# Patient Record
Sex: MALE | Race: WHITE | Employment: OTHER | URBAN - METROPOLITAN AREA
[De-identification: names, ages, dates, MRNs, and addresses within clinical notes are randomized per-mention and may not be internally consistent; named-entity substitution may affect disease eponyms.]

---

## 2017-12-14 ENCOUNTER — HOSPITAL ENCOUNTER (OUTPATIENT)
Dept: PHYSICAL THERAPY | Age: 82
Discharge: HOME | End: 2017-12-14
Payer: MEDICARE

## 2017-12-14 PROCEDURE — G9186 MOTOR SPEECH GOAL STATUS: HCPCS

## 2017-12-14 PROCEDURE — G8999 MOTOR SPEECH CURRENT STATUS: HCPCS

## 2017-12-14 PROCEDURE — 92522 EVALUATE SPEECH PRODUCTION: CPT

## 2017-12-14 NOTE — PROGRESS NOTES
ST DAILY TREATMENT NOTE    Patient Name: Elva Mauro  Date:2017  : 1935  [x]  Patient  Verified  Payor: /   In time:2:46  Out time:3:18  Total Treatment Time (min): 34  Visit #: 1 of 12    SUBJECTIVE  Pain Level (0-10 scale): 0  Any medication changes, allergies to medications, adverse drug reactions, diagnosis change, or new procedure performed?: [x] No    [] Yes (see summary sheet for update)  Subjective functional status/changes:   [] No changes reported  \"I want to increase my voice (volume) and improve my communication. \"  Date of Onset:   Social History: Retired   Prior Functional level: Independent with ADLs    OBJECTIVE    OUTPATIENT SPEECH-LANGUAGE EVALUATION    Speech:  Oral Verbal Apraxia: [x] None    [] Mild    [] Moderate    [] Severe   Dysarthria:  [] None    [x] Mild    [x] Moderate    [] Severe   Intelligibility:  [] WNL    [] Words   [x] Phrases   [x] Sentences   [x] Conversation      % Intelligible: 75-80%  Voice:   [] WNL    [] Hoarse   [] Breathy   Comments: Intermittent breathiness  Fluency:  [] WNL    [x] Dysfluent: Increased rate of speech during conversation and AMRs      Patient/Caregiver instruction/education: [] Review HEP    [] Progressed/Changed    HEP/Handouts given: Provided information regarding his treatment plan    Pain Level (0-10 scale) post treatment: 0    ASSESSMENT  [x]  See Plan of Care    Short Term Goals: To be accomplished in 4 weeks  1) Pt will produce sentence-length material with an average of 80-85 dB with min A in order to improve his speech intensity during daily tasks. 2) Pt will produce paragraph-length material with an average of 78-80 dB with min A in order to improve speech intensity during lengthier material.  3) Pt will engage in conversation maintaining 78-85 dB with min A in order to improve overall speech intensity during conversational exchanges with family and friends.   4) Pt will sustain phonation for greater than or equal to 20 seconds in 15 trials on 1 breath with min A to improve coordination of speech and respiration. Long Term Goals:  To be accomplished in 6 weeks   1) Pt will demonstrate improve respiratory strength and vocal intensity in conversation with family     PLAN  []  Upgrade activities as tolerated     [x]  Continue plan of care  []  Discharge due to:__  [] Other:__    TAMI Appiah 12/14/2017  3:59 PM

## 2017-12-14 NOTE — PROGRESS NOTES
[facility] at [facility] [place] [id number] [facility]  [phone] fax    Plan of Care/ Statement of Necessity for Speech Therapy Services    Patient name: Karen Mitchell Start of Care: 2017   Referral source: Denia Guevara DO : 1935    Medical Diagnosis: Parkinson's Onset [id number]   Treatment Diagnosis: Dysarthria   Prior Hospitalization: see medical history Provider#: [id number]   Medications: Verified on Patient summary List    Comorbidities: Depression, Past Prostate Cancer, Past Melanoma   Prior Level of Function: Independent with ADLs       The Plan of Care and following information is based on the information from the initial evaluation. Assessment/ key information: Pt is an 79-year-old male with a history of Parkinson's. His chief complaint is decreased vocal volume during conversational exchanges, as well as concerns that others have difficulty understanding his speech. The Pt reports that he was diagnosed with Parkinson's five years ago and he noted that he began noticing difficulty with speech intensity  In the past few years. Pt noted that he rarely talks on the phone because conversation partners have extreme difficulty understanding him. Pt also reported mild difficulty with swallowing when eating tough meats such as steaks and roast beef. He reported that he occasionally coughs, but experiences no other signs of aspiration. He tolerated 6 crackers today with no signs or symptoms of aspiration observed. He was provided information to protect his airway when eating meats. Pt maintained an average of 60-65 dB during sentence production, as measured by a sound level meter. He engaged in the administration of the Perceptual Dysarthria Evaluation. 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  Dysarthria is characterized my decreased speech intensity, decreased respiration and phonation coordination, fast and irregular AMRs, and fast and irregular conversational speech. He noted that people often ask him to repeat himself in the independent living facility where he resides. It is recommended that the Pt receive skilled speech therapy services as it is medically necessary to improve his vocal intensity and confidence during daily tasks. Problem List:      []aphasic  [x]dysarthric  []dysphagic       []alexic  []agraphic  []dysphonia       []dysfluency   []Cognitive-Linguistic Disorder       []other   Treatment Plan may include any combination of the following: Dysarthria Treatment      Patient / Family readiness to learn indicated by: asking questions, trying to perform skills and interest    Persons(s) to be included in education:   patient (P) and family support person (FSP);list Daughter    Barriers to Learning/Limitations: yes;  emotional and physical    Patient Goal (s): To increase volume and improve communication    Patient Self Reported Health Status: fair    Rehabilitation Potential: good    Short Term Goals: To be accomplished in 4 weeks  1) Pt will produce sentence-length material with an average of 80-85 dB with min A in order to improve his speech intensity during daily tasks. 2) Pt will produce paragraph-length material with an average of 78-80 dB with min A in order to improve speech intensity during lengthier material.  3) Pt will engage in conversation maintaining 78-85 dB with min A in order to improve overall speech intensity during conversational exchanges with family and friends. 4) Pt will sustain phonation for greater than or equal to 20 seconds in 15 trials on 1 breath with min A to improve coordination of speech and respiration. Long Term Goals: To be accomplished in 6 weeks   1) Pt will demonstrate improve respiratory strength and vocal intensity in conversation with family and friens with min A.     Frequency / Duration: Patient to be seen 3 times per week for 4 weeks:    Patient/ Caregiver education and instruction: Diagnosis, prognosis, treatment plan    Certification period: 12/14/17-1/13/18    G Codes:   MOTOR Current Status CL=60-79%   Motor Goal Status CK=40-59%    The severity rating is based on the following outcomes:    National Outcomes Measures (NOMS), Perceptual Dysarthria Evaluation, and Professional Judgment       Aileen Juárez M.S., CCC-SLP     12/14/17 3:30 PM  ________________________________________________________________________    I certify that the above Therapy Services are being furnished while the patient is under my care. I agree with the treatment plan and certify that this therapy is necessary.     Physician's Signature:____________________  Date:___________Time:_________    Please sign and return to [facility] at THE [place] [address] [facility]  [phone] fax     Thank you

## 2018-01-02 ENCOUNTER — HOSPITAL ENCOUNTER (OUTPATIENT)
Dept: PHYSICAL THERAPY | Age: 83
Discharge: HOME | End: 2018-01-02
Payer: MEDICARE

## 2018-01-02 ENCOUNTER — APPOINTMENT (OUTPATIENT)
Dept: PHYSICAL THERAPY | Age: 83
End: 2018-01-02
Payer: MEDICARE

## 2018-01-02 PROCEDURE — 92507 TX SP LANG VOICE COMM INDIV: CPT

## 2018-01-02 NOTE — PROGRESS NOTES
ST DAILY TREATMENT NOTE    Patient Name: Sita Jarrell  Date:2018  : 1935  [x]  Patient  Verified  Payor: Payor: Mckenzie Ego / Plan: [employer]MO / Product Type: HMO /   In time:2:45  Out time:3:15  Total Treatment Time (min): 30  Visit #: 2 of 12    Treatment Diagnosis: Parkinson's disease [G20]    SUBJECTIVE  Pain Level (0-10 scale): 0  Any medication changes, allergies to medications, adverse drug reactions, diagnosis change, or new procedure performed?: [x] No    [] Yes (see summary sheet for update)    Subjective functional status/changes:   [x] No changes reported  Pt reported no functional changes at this time. 1st session since initial eval    OBJECTIVE  Treatment provided includes:  Increase/Improve:  [x]  Voice Quality []  Cognitive Linguistic Skills []  Laryngeal/Pharyngeal Exercises   [x]  Vocal Loudness []  Reading Comprehension []  Swallowing Skills    []  Vocal Cord Function []  Auditory Comprehension []  Oral Motor Skills   []  Resonance []  Writing Skills []  Compensatory strategies    []  Speech Intelligibility []  Expressive Language []  Attention   [x]  Breath Support/Coord.  []  Receptive language []  Memory   []  Articulation []  Safety Awareness []    []  Fluency []  Word Retrieval []        Treatment Provided:  Pt engaged in sustained phonation of \"ah\" with a range of 70-79 dB with min cues to decrease straining  Pt read sentences x5 with an average of 67 dB  Pt read short paragraphs x6 with an average of 66 dB    Patient/Caregiver  Education: [x] Review HEP      HEP/Handouts given: Introduced daily HEP    Pain Level (0-10 scale) post treatment: 2 (Vocal hoarseness; prompted Pt to increase hydration)    ASSESSMENT   []   Improving appropriately and progressing toward goals  [x]   Improving slowly and progressing toward goals  []   Approximating goals/maximum potential  [x]   Continues to benefit from skilled therapy to address remaining functional deficits  []   Not progressing toward goals and plan of care will be adjusted    Patient will continue to benefit from skilled therapy to address remaining functional deficits: Hypokinetic Dysarthria    Progress towards goals / Updated goals:  Pt introduced to exercises program today to increase vocal loudness and respiration/phonation coordination. Pt's current vocal intensity below expected level. Pt demonstrated understanding of home exercise program and should continue to receive skilled speech therapy to address Dysarthria related to Parkinson's. PLAN  [x]  Continue plan of care  []  Modify Goals/Treatment Plan      []  Discharge due to:  [] Other:     Short Term Goals: To be accomplished in 4 weeks  1) Pt will produce sentence-length material with an average of 80-85 dB with min A in order to improve his speech intensity during daily tasks. 1/2/18: Pt read sentences x5 with an average of 67 dB  2) Pt will produce paragraph-length material with an average of 78-80 dB with min A in order to improve speech intensity during lengthier material. 1/2/18: Pt read short paragraphs x6 with an average of 66 dB  3) Pt will engage in conversation maintaining 78-85 dB with min A in order to improve overall speech intensity during conversational exchanges with family and friends. 4) Pt will sustain phonation for greater than or equal to 20 seconds in 15 trials on 1 breath with min A to improve coordination of speech and respiration.     TAMI Hazel 1/2/2018  2:56 PM    Future Appointments  Date Time Provider Rebecca Lyle   1/3/2018 2:00 PM TAMI Hazel [facility]   1/4/2018 2:45 PM TAMI Hazel [facility]

## 2018-01-03 ENCOUNTER — HOSPITAL ENCOUNTER (OUTPATIENT)
Dept: PHYSICAL THERAPY | Age: 83
Discharge: HOME | End: 2018-01-03
Payer: MEDICARE

## 2018-01-03 ENCOUNTER — APPOINTMENT (OUTPATIENT)
Dept: PHYSICAL THERAPY | Age: 83
End: 2018-01-03
Payer: MEDICARE

## 2018-01-03 PROCEDURE — 92507 TX SP LANG VOICE COMM INDIV: CPT

## 2018-01-03 NOTE — PROGRESS NOTES
ST DAILY TREATMENT NOTE    Patient Name: Roseann Oneil  Date:1/3/2018  : 1935  [x]  Patient  Verified  Payor: Payor: Dolores Payment / Plan: [employer] / Product Type: HMO /   In time:2:00  Out time:2:39  Total Treatment Time (min): 39  Visit #: 3 of 12    Treatment Diagnosis: Parkinson's disease [G20]    SUBJECTIVE  Pain Level (0-10 scale): 0  Any medication changes, allergies to medications, adverse drug reactions, diagnosis change, or new procedure performed?: [x] No    [] Yes (see summary sheet for update)    Subjective functional status/changes:   [] No changes reported  \"I was slow getting started this morning. \"    OBJECTIVE  Treatment provided includes:  Increase/Improve:  []  Voice Quality []  Cognitive Linguistic Skills []  Laryngeal/Pharyngeal Exercises   [x]  Vocal Loudness []  Reading Comprehension []  Swallowing Skills    []  Vocal Cord Function []  Auditory Comprehension []  Oral Motor Skills   []  Resonance []  Writing Skills []  Compensatory strategies    []  Speech Intelligibility []  Expressive Language []  Attention   [x]  Breath Support/Coord.  []  Receptive language []  Memory   []  Articulation []  Safety Awareness []    []  Fluency []  Word Retrieval []        Treatment Provided:  Pt engaged in sustained phonation of \"ah\" with a range of 66-78 dB with min cues to decrease straining  Pt read sentences x5 with an average of 67 dB  Pt read short paragraphs x6 with an average of 66 dB    Patient/Caregiver  Education: [x] Review HEP      HEP/Handouts given: Continue HEP     Pain Level (0-10 scale) post treatment: 0    ASSESSMENT   []   Improving appropriately and progressing toward goals  [x]   Improving slowly and progressing toward goals  []   Approximating goals/maximum potential  [x]   Continues to benefit from skilled therapy to address remaining functional deficits  []   Not progressing toward goals and plan of care will be adjusted    Patient will continue to benefit from skilled therapy to address remaining functional deficits: Hypokinetic Dysarthria    Progress towards goals / Updated goals:  Pt remains compliant with HEP and demonstrates knowledge of rationale for exercises. He requires cues to promote good vocal hygiene in the home, as he tends to become hoarse while performing exercises. It is recommended that he continue to receive skilled speech therapy services to address Dysarthria related to Parkinson's. PLAN  [x]  Continue plan of care  []  Modify Goals/Treatment Plan      []  Discharge due to:  [] Other:    Short Term Goals: To be accomplished in 4 weeks  1) Pt will produce sentence-length material with an average of 80-85 dB with min A in order to improve his speech intensity during daily tasks. 1/3/18: Pt read sentences x5 with an average of 68 dB  2) Pt will produce paragraph-length material with an average of 78-80 dB with min A in order to improve speech intensity during lengthier material. 1/3/18: Pt read short paragraphs x6 with an average of 67 dB  3) Pt will engage in conversation maintaining 78-85 dB with min A in order to improve overall speech intensity during conversational exchanges with family and friends. 4) Pt will sustain phonation for greater than or equal to 20 seconds in 15 trials on 1 breath with min A to improve coordination of speech and respiration.     TAMI Henson 1/3/2018  2:18 PM    Future Appointments  Date Time Provider Rebecca Lyle   1/4/2018 2:45 PM TAMI Henson [facility]

## 2018-01-04 ENCOUNTER — APPOINTMENT (OUTPATIENT)
Dept: PHYSICAL THERAPY | Age: 83
End: 2018-01-04
Payer: MEDICARE

## 2018-01-11 ENCOUNTER — HOSPITAL ENCOUNTER (OUTPATIENT)
Dept: PHYSICAL THERAPY | Age: 83
Discharge: HOME | End: 2018-01-11
Payer: MEDICARE

## 2018-01-11 PROCEDURE — G8999 MOTOR SPEECH CURRENT STATUS: HCPCS

## 2018-01-11 PROCEDURE — G9186 MOTOR SPEECH GOAL STATUS: HCPCS

## 2018-01-11 PROCEDURE — 92507 TX SP LANG VOICE COMM INDIV: CPT

## 2018-01-11 NOTE — PROGRESS NOTES
[facility]  [address] [place], [address] [facility]  Ph [phone]  Fx [phone]      Medicare Progress Report    Patient name: Carlos Manuel Tobar Start of Care: 17   Referral source: Connie Melendez DO : 1935   Medical/Treatment Diagnosis: Parkinson's disease [G20] Onset Date:     Prior Hospitalization: see medical history Provider#: [id number]   Medications: Verified on Patient Summary List    Comorbidities: Depression, Past Prostate Cancer, Past Melanoma  Prior Level of Function:Independent with ADLs  Visits from Start of Care: 4    Missed Visits: 0    Reporting Period: 17-18    Subjective Reports: Pt reports that he feels his voice is getting stronger. Goal: Pt will produce sentence-length material with an average of 80-85 dB with min A in order to improve his speech intensity during daily tasks. Status at last note/certification: Average of 67 dB x5  Current Status: not met; Average of 67 dB X5    Goal: Pt will produce paragraph-length material with an average of 78-80 dB with min A in order to improve speech intensity during lengthier material.  Status at last note/certification: short paragraphs x6 with an average of 66 dB  Current Status: not met; short paragraphs x3 with an average of 66 dB     Goal: Pt will engage in conversation maintaining 78-85 dB with min A in order to improve overall speech intensity during conversational exchanges with family and friends. Status at last note/certification: NOT YET ADDRESSED  Current Status: not met    Goal: Pt will sustain phonation for greater than or equal to 20 seconds in 15 trials on 1 breath with min A to improve coordination of speech and respiration.   Status at last note/certification: GOAL OMITTED  Current Status: not met    Key functional changes: Slightly louder with increased vocal intensity      Problems/ barriers to goal attainment: Physical     Assessment / Recommendations:Pt should continue receiving skilled speech and language therapy to address Dysarthria related to Parkinson's. Problem List:    []aphasic  [x]dysarthric  []dysphagic       []alexic  []agraphic  []dysphonia       []dysfluency   []Cognitive-Linguistic Disorder       []other        Treatment Plan: Dysarthria Treatment    Patient Goal (s) has been updated and includes: Continued work to improve vocal intensity     Updated Goals to be accomplished in 4 weeks:  1) Pt will produce sentence-length material with an average of 72-75 dB with min A in order to improve his speech intensity during daily tasks. 2) Pt will produce paragraph-length material with an average of 70-75 dB with min A in order to improve speech intensity during lengthier material.  3) Pt will engage in conversation maintaining 70-73 dB with min A in order to improve overall speech intensity during conversational exchanges with family and friends.     Frequency / Duration: Patient to be seen 2 times per week for 4 weeks:    G Codes:  [id number] Current  CL= 60-79%   Goal  CK= 40-59%      The severity rating is based on the following outcomes:    National Outcomes Measures (NOMS) and professional judgement      TAMI Duran 1/11/2018 5:19 PM

## 2018-01-11 NOTE — PROGRESS NOTES
ST DAILY TREATMENT NOTE    Patient Name: Ke Lehman  Date:2018  : 1935  [x]  Patient  Verified  Payor: Payor: Anika Daigle / Plan: [employer] HMO / Product Type: HMO /   In time:3:15  Out time:4:00  Total Treatment Time (min): 39  Visit #: 4 of 12    Treatment Diagnosis: Parkinson's disease [G20]    SUBJECTIVE  Pain Level (0-10 scale): 0  Any medication changes, allergies to medications, adverse drug reactions, diagnosis change, or new procedure performed?: [x] No    [] Yes (see summary sheet for update)    Subjective functional status/changes:   [] No changes reported  Pt's daughter reported that Pt called EMS this morning due to extreme difficulty swallowing. Pt was attempting to swallow saliva. Pt was not admitted to the hospital.    OBJECTIVE  Treatment provided includes:  Increase/Improve:  []  Voice Quality []  Cognitive Linguistic Skills []  Laryngeal/Pharyngeal Exercises   [x]  Vocal Loudness []  Reading Comprehension [x]  Swallowing Skills    []  Vocal Cord Function []  Auditory Comprehension []  Oral Motor Skills   []  Resonance []  Writing Skills [x]  Compensatory strategies    []  Speech Intelligibility []  Expressive Language []  Attention   [x]  Breath Support/Coord. []  Receptive language []  Memory   []  Articulation []  Safety Awareness []    []  Fluency []  Word Retrieval []        Treatment Provided:  Pt engaged in sustained phonation of \"ah\" with a range of 74-80 dB with min cues to decrease straining  Pt read sentences x5 with an average of 67 dB  Pt read short paragraphs x3 with an average of 66 dB   Informal swallow exam revealed normal hyolaryngeal excursion/elevation;  Difficulty initiating swallow     Patient/Caregiver  Education: [x] Review HEP      HEP/Handouts given: Continue HEP    Pain Level (0-10 scale) post treatment: 0    ASSESSMENT   []   Improving appropriately and progressing toward goals  [x]   Improving slowly and progressing toward goals  []   Approximating goals/maximum potential  []   Continues to benefit from skilled therapy to address remaining functional deficits  [x]   Not progressing toward goals and plan of care will be adjusted    Patient will continue to benefit from skilled therapy to address remaining functional deficits: Dysarthria and mild Dysphagia    Progress towards goals / Updated goals:  Pt's vocal intensity is improving slowly. SLP introduced compensatory swallow strategies to improve timing of swallow and reduce globus sensation. PLAN  [x]  Continue plan of care  []  Modify Goals/Treatment Plan      []  Discharge due to:  [] Other:     Short Term Goals: To be accomplished in 4 weeks  1) Pt will produce sentence-length material with an average of 80-85 dB with min A in order to improve his speech intensity during daily tasks. 1/11/18: Pt read sentences x5 with an average of 67 dB  2) Pt will produce paragraph-length material with an average of 78-80 dB with min A in order to improve speech intensity during lengthier material. 1/11/18: Pt read short paragraphs x6 with an average of 66 dB  3) Pt will engage in conversation maintaining 78-85 dB with min A in order to improve overall speech intensity during conversational exchanges with family and friends.   4) Pt will sustain phonation for greater than or equal to 20 seconds in 15 trials on 1 breath with min A to improve coordination of speech and respiration.       Vickii Goltz, SLP 1/11/2018  3:40 PM    Future Appointments  Date Time Provider Rebecca Lyle   1/18/2018 3:15 PM Vickii Goltz, SLP [facility]   1/23/2018 3:15 PM Vickii Goltz, SLP [facility]   1/25/2018 3:15 PM Vickii Goltz, SLP [facility]   1/30/2018 3:15 PM Vickii Goltz, SLP [facility]   2/1/2018 3:15 PM Vickii Goltz, SLP [facility]   2/6/2018 2:45 PM Vickii Goltz, SLP [facility]   2/8/2018 2:45 PM Vickii Goltz, SLP [facility]

## 2018-01-18 ENCOUNTER — APPOINTMENT (OUTPATIENT)
Dept: PHYSICAL THERAPY | Age: 83
End: 2018-01-18
Payer: MEDICARE

## 2018-01-23 ENCOUNTER — HOSPITAL ENCOUNTER (OUTPATIENT)
Dept: PHYSICAL THERAPY | Age: 83
Discharge: HOME | End: 2018-01-23
Payer: MEDICARE

## 2018-01-23 PROCEDURE — 92507 TX SP LANG VOICE COMM INDIV: CPT

## 2018-01-23 NOTE — PROGRESS NOTES
ST DAILY TREATMENT NOTE    Patient Name: Paco Zaidi  Date:2018  : 1935  [x]  Patient  Verified  Payor: Payor: Aissatou Fischer / Plan: Stephon Brink HMO / Product Type: HMO /   In time:3:15  Out time:3:55  Total Treatment Time (min): 40  Visit #: 1 of 8    Treatment Diagnosis: Parkinson's disease [G20]    SUBJECTIVE  Pain Level (0-10 scale): 0  Any medication changes, allergies to medications, adverse drug reactions, diagnosis change, or new procedure performed?: [x] No    [] Yes (see summary sheet for update)    Subjective functional status/changes:   [] No changes reported  Pt reported that he has been \"down in the dumps\" the last few days. OBJECTIVE  Treatment provided includes:  Increase/Improve:  []  Voice Quality []  Cognitive Linguistic Skills []  Laryngeal/Pharyngeal Exercises   [x]  Vocal Loudness []  Reading Comprehension []  Swallowing Skills    []  Vocal Cord Function []  Auditory Comprehension []  Oral Motor Skills   []  Resonance []  Writing Skills []  Compensatory strategies    []  Speech Intelligibility []  Expressive Language []  Attention   [x]  Breath Support/Coord.  []  Receptive language []  Memory   []  Articulation []  Safety Awareness []    []  Fluency []  Word Retrieval []        Treatment Provided:  Pt engaged in sustained phonation of \"ah\" with an average of 77 dB Víctor  Pt read sentences x5 with an average of 68 dB  Pt read short paragraphs x8 with an average of 67 dB     Patient/Caregiver  Education: [x] Review HEP      HEP/Handouts given: Continue HEP, but discontinue practice if severe hoarseness becomes an issue    Pain Level (0-10 scale) post treatment: 0    ASSESSMENT   []   Improving appropriately and progressing toward goals  [x]   Improving slowly and progressing toward goals  []   Approximating goals/maximum potential  [x]   Continues to benefit from skilled therapy to address remaining functional deficits  []   Not progressing toward goals and plan of care will be adjusted    Patient will continue to benefit from skilled therapy to address remaining functional deficits: Dysarthria    Progress towards goals / Updated goals:  Pt continues to make slow progress towards goals. He has demonstrated small improvements in his ability to read short sentences and paragraphs with increased vocal volume. It is recommended that he continue to receive skilled speech therapy to address remaining deficits related to Dysarthria. PLAN  [x]  Continue plan of care  []  Modify Goals/Treatment Plan      []  Discharge due to:  [] Other:    Short-Term Goals to be accomplished in 4 weeks:  1) Pt will produce sentence-length material with an average of 72-75 dB with min A in order to improve his speech intensity during daily tasks.  1/23/18: Pt read sentences x5 with an average of 68 dB  2) Pt will produce paragraph-length material with an average of 70-75 dB with min A in order to improve speech intensity during lengthier material. 1/23/18: Pt read short paragraphs x8 with an average of 67 dB   3) Pt will engage in conversation maintaining 70-73 dB with min A in order to improve overall speech intensity during conversational exchanges with family and friends    TAMI Galicia 1/23/2018  3:23 PM    Future Appointments  Date Time Provider Rebecca Lyle   1/25/2018 3:15 PM TAMI Galicia [facility]   1/30/2018 3:15 PM TAMI Galicia [facility]   2/1/2018 3:15 PM TAMI Galicia [facility]   2/6/2018 2:45 PM TAMI Galicia [facility]   2/8/2018 2:45 PM TAMI Galicia [facility]

## 2018-01-25 ENCOUNTER — HOSPITAL ENCOUNTER (OUTPATIENT)
Dept: PHYSICAL THERAPY | Age: 83
Discharge: HOME | End: 2018-01-25
Payer: MEDICARE

## 2018-01-25 PROCEDURE — 92507 TX SP LANG VOICE COMM INDIV: CPT

## 2018-01-25 NOTE — PROGRESS NOTES
ST DAILY TREATMENT NOTE    Patient Name: Millie Greene  Date:2018  : 1935  [x]  Patient  Verified  Payor: Payor: Erlinda Jean Baptiste / Plan: Stefan Perry HMO / Product Type: HMO /   In time:3:15  Out time:4:00  Total Treatment Time (min): 39  Visit #: 2 of 8    Treatment Diagnosis: Parkinson's disease [G20]    SUBJECTIVE  Pain Level (0-10 scale): 0  Any medication changes, allergies to medications, adverse drug reactions, diagnosis change, or new procedure performed?: [x] No    [] Yes (see summary sheet for update)    Subjective functional status/changes:   [] No changes reported  Pt reported that he has been able to complete HEP more frequently over the past couple of days. OBJECTIVE  Treatment provided includes:  Increase/Improve:  []  Voice Quality []  Cognitive Linguistic Skills []  Laryngeal/Pharyngeal Exercises   [x]  Vocal Loudness []  Reading Comprehension []  Swallowing Skills    []  Vocal Cord Function []  Auditory Comprehension []  Oral Motor Skills   []  Resonance []  Writing Skills []  Compensatory strategies    [x]  Speech Intelligibility []  Expressive Language []  Attention   []  Breath Support/Coord.  []  Receptive language []  Memory   []  Articulation []  Safety Awareness []    []  Fluency []  Word Retrieval []        Treatment Provided:  Pt engaged in sustained phonation of \"ah\" with an average of 76 dB Víctor  Pt read sentences x5 with an average of 71 dB  Pt engaged in conversation with an average of 65 dB     Patient/Caregiver  Education: [x] Review HEP      HEP/Handouts given: Continue HEP    Pain Level (0-10 scale) post treatment: 0    ASSESSMENT   []   Improving appropriately and progressing toward goals  [x]   Improving slowly and progressing toward goals  []   Approximating goals/maximum potential  [x]   Continues to benefit from skilled therapy to address remaining functional deficits  []   Not progressing toward goals and plan of care will be adjusted    Patient will continue to benefit from skilled therapy to address remaining functional deficits: Dysarthria    Progress towards goals / Updated goals:  Pt demonstrated improved vocal intensity during oral reading of sentences this session. Continued work is needed to maintain vocal intensity during conversational speech. It is recommended that he continue to receive skilled speech and language therapy. PLAN  [x]  Continue plan of care  []  Modify Goals/Treatment Plan      []  Discharge due to:  [] Other:    Short-Term Goals to be accomplished in 4 weeks:  1) Pt will produce sentence-length material with an average of 72-75 dB with min A in order to improve his speech intensity during daily tasks. 1/25/18: Pt read sentences x5 with an average of 71 dB  2) Pt will produce paragraph-length material with an average of 70-75 dB with min A in order to improve speech intensity during lengthier material.   3) Pt will engage in conversation maintaining 70-73 dB with min A in order to improve overall speech intensity during conversational exchanges with family and friends.  1/25/18: Pt engaged in conversation with an average of 65 dB     TAMI York 1/25/2018  3:17 PM    Future Appointments  Date Time Provider Rebecca Lyle   1/30/2018 3:15 PM TAMI York [facility]   2/1/2018 3:15 PM TAMI York [facility]   2/2/2018 10:30 AM [facility]   2/6/2018 2:45 PM TAMI York [facility]   2/8/2018 2:45 PM TAMI York [facility]

## 2018-01-30 ENCOUNTER — HOSPITAL ENCOUNTER (OUTPATIENT)
Dept: PHYSICAL THERAPY | Age: 83
Discharge: HOME | End: 2018-01-30
Payer: MEDICARE

## 2018-01-30 PROCEDURE — 92507 TX SP LANG VOICE COMM INDIV: CPT

## 2018-01-30 NOTE — PROGRESS NOTES
ST DAILY TREATMENT NOTE    Patient Name: Tyler Larsen  Date:2018  : 1935  [x]  Patient  Verified  Payor: Payor: Alfredo Huang / Plan: [employer] HMO / Product Type: HMO /   In time:3:15  Out time:3:50  Total Treatment Time (min): 35  Visit #: 3 of 8    Treatment Diagnosis: Parkinson's disease [G20]    SUBJECTIVE  Pain Level (0-10 scale): 0; Pt reported he is experiencing cold symptoms  Any medication changes, allergies to medications, adverse drug reactions, diagnosis change, or new procedure performed?: [x] No    [] Yes (see summary sheet for update)    Subjective functional status/changes:   [] No changes reported  \"One of my buddies said he could hear a slight difference in my voice. \"    OBJECTIVE  Treatment provided includes:  Increase/Improve:  []  Voice Quality []  Cognitive Linguistic Skills []  Laryngeal/Pharyngeal Exercises   [x]  Vocal Loudness []  Reading Comprehension []  Swallowing Skills    []  Vocal Cord Function []  Auditory Comprehension []  Oral Motor Skills   []  Resonance []  Writing Skills []  Compensatory strategies    []  Speech Intelligibility []  Expressive Language []  Attention   [x]  Breath Support/Coord.  []  Receptive language []  Memory   []  Articulation []  Safety Awareness []    []  Fluency []  Word Retrieval []        Treatment Provided:  Pt engaged in sustained phonation of \"ah\" with an average of 75 dB Víctor  Pt read sentences x5 with an average of 70 dB  Pt read paragraph-level material with an average of 69 dB     Patient/Caregiver  Education: [x] Review HEP      HEP/Handouts given: Continue HEP at least 1-2x daily    Pain Level (0-10 scale) post treatment: 0    ASSESSMENT   []   Improving appropriately and progressing toward goals  [x]   Improving slowly and progressing toward goals  []   Approximating goals/maximum potential  [x]   Continues to benefit from skilled therapy to address remaining functional deficits  []   Not progressing toward goals and plan of care will be adjusted    Patient will continue to benefit from skilled therapy to address remaining functional deficits: Dysarthria    Progress towards goals / Updated goals:  Pt continues to make slow progress towards goal. He demonstrated improved loudness and vocal control during sustained phonation exercises this date. Pt engaged in the administration of the Voice Handicap Index (VHI) to assess his current attitudes regarding his voice. He received a Moderate rating in terms of how his voice impacts him functionally, physically, and emotionally. It is recommended that he continue to receive skilled speech therapy to address remaining deficits related to Dysarthria. PLAN  [x]  Continue plan of care  []  Modify Goals/Treatment Plan      []  Discharge due to:  [] Other:     Short-Term Goals to be accomplished in 4 weeks:  1) Pt will produce sentence-length material with an average of 72-75 dB with min A in order to improve his speech intensity during daily tasks. 1/30/18: Pt read sentences x5 with an average of 70 dB  2) Pt will produce paragraph-length material with an average of 70-75 dB with min A in order to improve speech intensity during lengthier material. 1/30/18: Pt read paragraph-level material with an average of 69 dB  3) Pt will engage in conversation maintaining 70-73 dB with min A in order to improve overall speech intensity during conversational exchanges with family and friends.     TAMI العراقي 1/30/2018  3:16 PM    Future Appointments  Date Time Provider Rebecca Lyle   2/1/2018 3:15 PM TAMI العراقي [facility]   2/2/2018 10:30 AM THE [facility] [place]  3 [facility]   2/6/2018 2:45 PM TAMI العراقي [facility]   2/8/2018 2:45 PM TAMI العراقي [facility]

## 2018-02-01 ENCOUNTER — HOSPITAL ENCOUNTER (OUTPATIENT)
Dept: PHYSICAL THERAPY | Age: 83
Discharge: HOME | End: 2018-02-01
Payer: MEDICARE

## 2018-02-01 PROCEDURE — 92507 TX SP LANG VOICE COMM INDIV: CPT

## 2018-02-01 NOTE — PROGRESS NOTES
ST DAILY TREATMENT NOTE    Patient Name: Sheila Sheehan  Date:2018  : 1935  [x]  Patient  Verified  Payor: Payor: Ligia Artis / Plan: [employer] HMO / Product Type: HMO /   In time:3:20  Out time:3:55  Total Treatment Time (min): 30  Visit #: 4 of 8    Treatment Diagnosis: Parkinson's disease [G20]    SUBJECTIVE  Pain Level (0-10 scale): 0  Any medication changes, allergies to medications, adverse drug reactions, diagnosis change, or new procedure performed?: [x] No    [] Yes (see summary sheet for update)    Subjective functional status/changes:   [] No changes reported  \"I'm having an internal lima with self today. \"     OBJECTIVE  Treatment provided includes:  Increase/Improve:  []  Voice Quality []  Cognitive Linguistic Skills []  Laryngeal/Pharyngeal Exercises   [x]  Vocal Loudness []  Reading Comprehension []  Swallowing Skills    []  Vocal Cord Function []  Auditory Comprehension []  Oral Motor Skills   []  Resonance []  Writing Skills []  Compensatory strategies    []  Speech Intelligibility []  Expressive Language []  Attention   [x]  Breath Support/Coord.  []  Receptive language []  Memory   []  Articulation []  Safety Awareness []    []  Fluency []  Word Retrieval []        Treatment Provided:  Pt engaged in sustained phonation of \"ah\" with a range of 74-78 dB Víctor   Pt read sentences x5 with an average of 68 dB    Patient/Caregiver  Education: [x] Review HEP      HEP/Handouts given: Continue     Pain Level (0-10 scale) post treatment: 0    ASSESSMENT   []   Improving appropriately and progressing toward goals  [x]   Improving slowly and progressing toward goals  []   Approximating goals/maximum potential  [x]   Continues to benefit from skilled therapy to address remaining functional deficits  []   Not progressing toward goals and plan of care will be adjusted    Patient will continue to benefit from skilled therapy to address remaining functional deficits: Dysarthria    Progress towards goals / Updated goals:  Pt continues to make slow progress towards goal. He demonstrated improved loudness and vocal control during sustained phonation exercises this date. Pt continues to demonstrate low vocal intensity during conversational speech. It is recommended that he continue to receive skilled speech therapy to address remaining deficits related to Dysarthria. PLAN  [x]  Continue plan of care  []  Modify Goals/Treatment Plan      []  Discharge due to:  [] Other:     Short-Term Goals to be accomplished in 4 weeks:  1) Pt will produce sentence-length material with an average of 72-75 dB with min A in order to improve his speech intensity during daily tasks. 2/1/18: Pt read sentences x5 with an average of 68 dB  2) Pt will produce paragraph-length material with an average of 70-75 dB with min A in order to improve speech intensity during lengthier material.  3) Pt will engage in conversation maintaining 70-73 dB with min A in order to improve overall speech intensity during conversational exchanges with family and friends. 2/1/18: Pt maintained an average of 64 dB during conversation this session.     TAMI Dave 2/1/2018  3:21 PM    Future Appointments  Date Time Provider Department Center   2/2/2018 10:30 AM [facility] [place]  3 [facility]   2/6/2018 2:45 PM TAMI Dave [facility]   2/8/2018 2:45 PM TAMI Dave [facility]

## 2018-02-02 ENCOUNTER — HOSPITAL ENCOUNTER (OUTPATIENT)
Dept: GENERAL RADIOLOGY | Age: 83
Discharge: HOME | End: 2018-02-02
Attending: INTERNAL MEDICINE
Payer: MEDICARE

## 2018-02-02 DIAGNOSIS — K21.9 GERD (GASTROESOPHAGEAL REFLUX DISEASE): ICD-10-CM

## 2018-02-02 DIAGNOSIS — R13.10 DYSPHAGIA: ICD-10-CM

## 2018-02-02 PROCEDURE — 74220 X-RAY XM ESOPHAGUS 1CNTRST: CPT

## 2018-02-02 PROCEDURE — 74011000250 HC RX REV CODE- 250: Performed by: INTERNAL MEDICINE

## 2018-02-02 PROCEDURE — 74011000255 HC RX REV CODE- 255: Performed by: INTERNAL MEDICINE

## 2018-02-02 RX ADMIN — BARIUM SULFATE 135 ML: 980 POWDER, FOR SUSPENSION ORAL at 11:38

## 2018-02-02 RX ADMIN — ANTACID/ANTIFLATULENT 4 G: 380; 550; 10; 10 GRANULE, EFFERVESCENT ORAL at 11:38

## 2018-02-02 RX ADMIN — BARIUM SULFATE 176 G: 960 POWDER, FOR SUSPENSION ORAL at 11:38

## 2018-02-06 ENCOUNTER — HOSPITAL ENCOUNTER (OUTPATIENT)
Dept: PHYSICAL THERAPY | Age: 83
Discharge: HOME | End: 2018-02-06
Payer: MEDICARE

## 2018-02-06 PROCEDURE — G9186 MOTOR SPEECH GOAL STATUS: HCPCS

## 2018-02-06 PROCEDURE — G8999 MOTOR SPEECH CURRENT STATUS: HCPCS

## 2018-02-06 PROCEDURE — 92507 TX SP LANG VOICE COMM INDIV: CPT

## 2018-02-06 NOTE — PROGRESS NOTES
[facility] at THE [facility]  [address], [address]  Ph [phone]  Fx [phone]    Continued Plan of Care/ Re-certification for Speech Therapy Services    Patient name: Khai Hsu Start of Care: 2017   Referral source: Brian Kim DO : 1935   Medical/Treatment Diagnosis: Parkinson's disease [G20] Onset Date:     Prior Hospitalization: see medical history Provider#: [id number]   Medications: Verified on Patient Summary List    Comorbidities: Depression, Past Prostate Cancer, Past Melanoma  Prior Level of Function:Independent with ADLs  Visits from Start of Care: 8    Missed Visits: 0    The Plan of Care and following information is based on the patient's current status:  Goal: Pt will produce sentence-length material with an average of 72-75 dB with min A in order to improve his speech intensity during daily tasks. Status at last note/certification: Average of 67 dB  Current Status: GOAL MET; 71 dB    Goal: Pt will produce paragraph-length material with an average of 70-75 dB with min A in order to improve speech intensity during lengthier material.  Status at last note/certification: 66 dB  Current Status: not met; 69 dB     Goal: Pt will engage in conversation maintaining 70-73 dB with min A in order to improve overall speech intensity during conversational exchanges with family and friends. Status at last note/certification: 64 dB  Current Status: not met; 67 dB    Key functional changes: Pt has demonstrated improvements maintaining vocal intensity during sustained phonation exercises and sentence production.       Problems/ barriers to goal attainment: Physical, emotional     Problem List:      []aphasic  [x]dysarthric  []dysphagic       []alexic  []agraphic  []dysphonia       []dysfluency  []Cognitive-Linguistic Disorder       []other     Treatment Plan: Dysarthria Treatment and Patient Education    Patient Goal (s) has been updated and includes: Continued treatment for Dysarthria with a focus on increased vocal strength and intensity     Goals for this certification period to be accomplished in 4 weeks:  1) Pt will engage in sustained phonation exercises with an average of 76-80 dB with min A in order to improve overall vocal strength and intensity for conversational exchanges. 2) Pt will produce paragraph-length material with an average of 70-75 dB with min A in order to improve speech intensity during lengthier material.  3) Pt will engage in conversation maintaining 70-73 dB with min A in order to improve overall speech intensity during conversational exchanges with family and friends. Frequency / Duration: Patient to be seen 2 times per week for 4 weeks:    Assessment/Recommendations: It is recommended that the Pt continue to receive skilled speech and language therapy to address improved vocal intensity in everyday situations. G Codes: Motor:  Current  CK= 40-59%   Goal  CJ= 20-39%      The severity rating is based on the following outcomes:    National Outcomes Measures (NOMS) and Professional Judgement    Certification Period: 2/6/18-3/8/18    Meghan Arguello. Jayant Greene M.S., CCC-SLP    2/6/2018 2:47 PM    _____________________________________________________________________    I certify that the above Therapy Services are being furnished while the patient is under my care. I agree with the treatment plan and certify that this therapy is necessary. []  I have read the above report and request that my patient continue as recommended. []  I have read the above report and request that my patient continue therapy with the following changes/special instructions:________________________________________  []I have read the above report and request that my patient be discharged from therapy.     Physician's Signature:_________________ Date:___________Time:__________      Please sign and return to   [facility] at [address], [address] [place]      Ph [phone]  Fx [phone]

## 2018-02-07 NOTE — PROGRESS NOTES
ST DAILY TREATMENT NOTE    Patient Name: Sheila Sheehan  Date:2018  : 1935  [x]  Patient  Verified  Payor: Payor: Ligia Artis / Plan: [employer] HMO / Product Type: HMO /   In time:2:45  Out time:3:15  Total Treatment Time (min): 30  Visit #: 1 of 8    Treatment Diagnosis: Parkinson's disease [G20]    SUBJECTIVE  Pain Level (0-10 scale): 0  Any medication changes, allergies to medications, adverse drug reactions, diagnosis change, or new procedure performed?: [x] No    [] Yes (see summary sheet for update)    Subjective functional status/changes:   [] No changes reported  \"I'm having a slow day. \"    OBJECTIVE  Treatment provided includes:  Increase/Improve:  []  Voice Quality []  Cognitive Linguistic Skills []  Laryngeal/Pharyngeal Exercises   [x]  Vocal Loudness []  Reading Comprehension []  Swallowing Skills    []  Vocal Cord Function []  Auditory Comprehension []  Oral Motor Skills   []  Resonance []  Writing Skills []  Compensatory strategies    []  Speech Intelligibility []  Expressive Language []  Attention   [x]  Breath Support/Coord.  []  Receptive language []  Memory   []  Articulation []  Safety Awareness []    []  Fluency []  Word Retrieval []        Treatment Provided:  Pt engaged in sustained phonation of \"ah\" with an average of 77 dB with min cues to reduce vocal strain   Pt engaged in conversation with an average of 67 dB    Patient/Caregiver  Education: [x] Review HEP      HEP/Handouts given: Continue HEP    Pain Level (0-10 scale) post treatment: 0    ASSESSMENT   [x]   Improving appropriately and progressing toward goals  []   Improving slowly and progressing toward goals  []   Approximating goals/maximum potential  [x]   Continues to benefit from skilled therapy to address remaining functional deficits  []   Not progressing toward goals and plan of care will be adjusted    Patient will continue to benefit from skilled therapy to address remaining functional deficits: Dysarthria    Progress towards goals / Updated goals:  Pt has demonstrated improvements maintaining vocal intensity during sustained phonation exercises and sentence production. It is recommended that the Pt continue to receive skilled speech and language therapy to address improved vocal intensity in everyday situations.     PLAN  [x]  Continue plan of care  []  Modify Goals/Treatment Plan      []  Discharge due to:  [] Other:    TAMI Hazel 2/7/2018  11:05 AM    Future Appointments  Date Time Provider Rebecca Lyle   2/8/2018 2:45 PM TAMI Hazel [facility]

## 2018-02-08 ENCOUNTER — HOSPITAL ENCOUNTER (OUTPATIENT)
Dept: PHYSICAL THERAPY | Age: 83
Discharge: HOME | End: 2018-02-08
Payer: MEDICARE

## 2018-02-08 PROCEDURE — 92507 TX SP LANG VOICE COMM INDIV: CPT

## 2018-02-08 NOTE — PROGRESS NOTES
ST DAILY TREATMENT NOTE    Patient Name: Joshua Landa  Date:2018  : 1935  [x]  Patient  Verified  Payor: Payor: Svetlana Weiner / Plan: Annetta Carbone HMO / Product Type: HMO /   In time:2:45  Out time:3:15  Total Treatment Time (min): 30  Visit #: 2 of 8    Treatment Diagnosis: Parkinson's disease [G20]    SUBJECTIVE  Pain Level (0-10 scale): 0  Any medication changes, allergies to medications, adverse drug reactions, diagnosis change, or new procedure performed?: [x] No    [] Yes (see summary sheet for update)    Subjective functional status/changes:   [] No changes reported  \"My voice doesn't feel nearly as stressed as it did before. \"    OBJECTIVE  Treatment provided includes:  Increase/Improve:  []  Voice Quality []  Cognitive Linguistic Skills []  Laryngeal/Pharyngeal Exercises   [x]  Vocal Loudness []  Reading Comprehension []  Swallowing Skills    []  Vocal Cord Function []  Auditory Comprehension []  Oral Motor Skills   []  Resonance []  Writing Skills []  Compensatory strategies    []  Speech Intelligibility []  Expressive Language []  Attention   [x]  Breath Support/Coord.  []  Receptive language []  Memory   []  Articulation []  Safety Awareness []    []  Fluency []  Word Retrieval []        Treatment Provided:  Pt engaged in sustained phonation of \"ah\" with an average of 76 dB with min cues   Pt produced paragraph-length material with an average of 69 dB with min cues  Pt engaged in conversation with an average of 67 dB    Patient/Caregiver  Education: [x] Review HEP      HEP/Handouts given: Continue HEP    Pain Level (0-10 scale) post treatment: 0    ASSESSMENT   []   Improving appropriately and progressing toward goals  [x]   Improving slowly and progressing toward goals  []   Approximating goals/maximum potential  [x]   Continues to benefit from skilled therapy to address remaining functional deficits  []   Not progressing toward goals and plan of care will be adjusted    Patient will continue to benefit from skilled therapy to address remaining functional deficits: Dysarthria    Progress towards goals / Updated goals:  Pt is making steady progress towards goals. He demonstrated increased vocal intensity during conversation this date. It is recommended that he continue to receive skilled speech and language therapy. PLAN  [x]  Continue plan of care  []  Modify Goals/Treatment Plan      []  Discharge due to:  [] Other:    Goals for this certification period to be accomplished in 4 weeks:  1) Pt will engage in sustained phonation exercises with an average of 76-80 dB with min A in order to improve overall vocal strength and intensity for conversational exchanges. 2/8/18: Pt engaged in sustained phonation of \"ah\" with an average of 76 dB with min cues  2) Pt will produce paragraph-length material with an average of 70-75 dB with min A in order to improve speech intensity during lengthier material. 2/8/18: Pt produced paragraph-length material with an average of 69 dB with min cues  3) Pt will engage in conversation maintaining 70-73 dB with min A in order to improve overall speech intensity during conversational exchanges with family and friends. 2/8/18: Pt engaged in conversation with an average of 67 dB     Ronda Degroot, SLP 2/8/2018  2:52 PM    No future appointments.

## 2018-02-16 ENCOUNTER — HOSPITAL ENCOUNTER (OUTPATIENT)
Dept: PHYSICAL THERAPY | Age: 83
Discharge: HOME | End: 2018-02-16
Payer: MEDICARE

## 2018-02-16 PROCEDURE — 92507 TX SP LANG VOICE COMM INDIV: CPT

## 2018-02-16 NOTE — PROGRESS NOTES
ST DAILY TREATMENT NOTE    Patient Name: Chicho Esparza  Date:2018  : 1935  [x]  Patient  Verified  Payor: Payor: Boni Slipper / Plan: India Litten HMO / Product Type: HMO /   In time:11:15  Out time:11:45  Total Treatment Time (min): 30  Visit #: 3 of 8    Treatment Diagnosis: Parkinson's disease [G20]    SUBJECTIVE  Pain Level (0-10 scale): 0  Any medication changes, allergies to medications, adverse drug reactions, diagnosis change, or new procedure performed?: [x] No    [] Yes (see summary sheet for update)    Subjective functional status/changes:   [] No changes reported  Pt reported that he wasn't feeling well today. OBJECTIVE  Treatment provided includes:  Increase/Improve:  []  Voice Quality []  Cognitive Linguistic Skills []  Laryngeal/Pharyngeal Exercises   [x]  Vocal Loudness []  Reading Comprehension []  Swallowing Skills    []  Vocal Cord Function []  Auditory Comprehension []  Oral Motor Skills   []  Resonance []  Writing Skills []  Compensatory strategies    []  Speech Intelligibility []  Expressive Language []  Attention   [x]  Breath Support/Coord.  []  Receptive language []  Memory   []  Articulation []  Safety Awareness []    []  Fluency []  Word Retrieval []        Treatment Provided:  Pt engaged in sustained phonation of \"ah\" with an average of 73 dB with min cues   Pt produced paragraph-length material with an average of 68 dB with min cues  Pt engaged in conversation with an average of 64 dB    Patient/Caregiver  Education: [x] Review HEP      HEP/Handouts given: Continue HEP    Pain Level (0-10 scale) post treatment: 0    ASSESSMENT   []   Improving appropriately and progressing toward goals  [x]   Improving slowly and progressing toward goals  []   Approximating goals/maximum potential  [x]   Continues to benefit from skilled therapy to address remaining functional deficits  []   Not progressing toward goals and plan of care will be adjusted    Patient will continue to benefit from skilled therapy to address remaining functional deficits: Dysarthria     Progress towards goals / Updated goals:  Pt is making steady progress towards goals. He demonstrated improved vocal intensity during the production of paragraph-level material.    PLAN  [x]  Continue plan of care  []  Modify Goals/Treatment Plan      []  Discharge due to:  [] Other:    Goals for this certification period to be accomplished in 4 weeks:  1) Pt will engage in sustained phonation exercises with an average of 76-80 dB with min A in order to improve overall vocal strength and intensity for conversational exchanges. 2/16/18: Pt engaged in sustained phonation of \"ah\" with an average of 73 dB with min cues  2) Pt will produce paragraph-length material with an average of 70-75 dB with min A in order to improve speech intensity during lengthier material. 2/16/18: Pt produced paragraph-length material with an average of 68 dB with min cues  3) Pt will engage in conversation maintaining 70-73 dB with min A in order to improve overall speech intensity during conversational exchanges with family and friends.  2/16/18: Pt engaged in conversation with an average of 64 dB     TAMI Seay 2/16/2018  11:14 AM    Future Appointments  Date Time Provider Rebecca Lyle   2/16/2018 11:15 AM TAMI Seay [facility]   2/22/2018 2:45 PM TAMI Seay [facility]   3/1/2018 2:45 PM TAMI Seay [facility]   3/7/2018 12:30 PM TAMI Seay [facility]   3/8/2018 1:15 PM TAMI Seay [facility]

## 2018-02-22 ENCOUNTER — HOSPITAL ENCOUNTER (OUTPATIENT)
Dept: PHYSICAL THERAPY | Age: 83
Discharge: HOME | End: 2018-02-22
Payer: MEDICARE

## 2018-02-22 PROCEDURE — 92507 TX SP LANG VOICE COMM INDIV: CPT

## 2018-02-22 NOTE — PROGRESS NOTES
ST DAILY TREATMENT NOTE    Patient Name: Augustina Sicard  Date:2018  : 1935  [x]  Patient  Verified  Payor: Payor: Berto Prajapati / Plan: Andrew Almonte HMO / Product Type: HMO /   In time:2:45  Out time:3:15  Total Treatment Time (min): 30  Visit #: 4 of 8    Treatment Diagnosis: Parkinson's disease [G20]    SUBJECTIVE  Pain Level (0-10 scale): 0  Any medication changes, allergies to medications, adverse drug reactions, diagnosis change, or new procedure performed?: [x] No    [] Yes (see summary sheet for update)    Subjective functional status/changes:   [] No changes reported  Pt says he has been feeling a little under the weather, but has been doing the HEP as often as possible. OBJECTIVE  Treatment provided includes:  Increase/Improve:  []  Voice Quality []  Cognitive Linguistic Skills []  Laryngeal/Pharyngeal Exercises   [x]  Vocal Loudness []  Reading Comprehension []  Swallowing Skills    []  Vocal Cord Function []  Auditory Comprehension []  Oral Motor Skills   []  Resonance []  Writing Skills []  Compensatory strategies    []  Speech Intelligibility []  Expressive Language []  Attention   [x]  Breath Support/Coord.  []  Receptive language []  Memory   []  Articulation []  Safety Awareness []    []  Fluency []  Word Retrieval []        Treatment Provided:  Pt engaged in sustained phonation of \"ah\" with an average of 82 dB with min cues   Pt engaged in conversation with an average of 67 dB     Patient/Caregiver  Education: [x] Review HEP      HEP/Handouts given: Continue HEP    Pain Level (0-10 scale) post treatment: 0    ASSESSMENT   []   Improving appropriately and progressing toward goals  [x]   Improving slowly and progressing toward goals  []   Approximating goals/maximum potential  []   Continues to benefit from skilled therapy to address remaining functional deficits  []   Not progressing toward goals and plan of care will be adjusted    Patient will continue to benefit from skilled therapy to address remaining functional deficits: Dysarthria    Progress towards goals / Updated goals:  Pt demonstrated improved sustained phonation this session and conversational speech intensity this session. PLAN  [x]  Continue plan of care  []  Modify Goals/Treatment Plan      []  Discharge due to:  [] Other:      Goals for this certification period to be accomplished in 4 weeks:  1) Pt will engage in sustained phonation exercises with an average of 76-80 dB with min A in order to improve overall vocal strength and intensity for conversational exchanges.  2/22/18: Pt engaged in sustained phonation of \"ah\" with an average of 82 dB with min cues   2) Pt will produce paragraph-length material with an average of 70-75 dB with min A in order to improve speech intensity during lengthier material.   3) Pt will engage in conversation maintaining 70-73 dB with min A in order to improve overall speech intensity during conversational exchanges with family and friends. 2/22/18: Pt engaged in conversation with an average of 67 dB      TAMI Hazel 2/22/2018  2:47 PM    Future Appointments  Date Time Provider Rebecca Lyle   3/1/2018 2:45 PM TAMI Hazel [facility]   3/7/2018 12:30 PM TAMI Hazel [facility]   3/8/2018 1:15 PM TAMI Hazel [facility]

## 2018-03-01 ENCOUNTER — HOSPITAL ENCOUNTER (OUTPATIENT)
Dept: PHYSICAL THERAPY | Age: 83
Discharge: HOME | End: 2018-03-01
Payer: MEDICARE

## 2018-03-01 PROCEDURE — 92507 TX SP LANG VOICE COMM INDIV: CPT

## 2018-03-01 NOTE — PROGRESS NOTES
ST DAILY TREATMENT NOTE    Patient Name: Josh Brown  Date:3/1/2018  : 1935  [x]  Patient  Verified  Payor: Payor: Beatriz Duarte / Plan: [employer] HMO / Product Type: HMO /   In time:2:45  Out time:3:15  Total Treatment Time (min): 30  Visit #: 5 of 8    Treatment Diagnosis: Parkinson's disease [G20]    SUBJECTIVE  Pain Level (0-10 scale): 0  Any medication changes, allergies to medications, adverse drug reactions, diagnosis change, or new procedure performed?: [x] No    [] Yes (see summary sheet for update)    Subjective functional status/changes:   [] No changes reported  \"I feel kind of sluggish today. \"    OBJECTIVE  Treatment provided includes:  Increase/Improve:  []  Voice Quality []  Cognitive Linguistic Skills []  Laryngeal/Pharyngeal Exercises   [x]  Vocal Loudness []  Reading Comprehension []  Swallowing Skills    []  Vocal Cord Function []  Auditory Comprehension []  Oral Motor Skills   []  Resonance []  Writing Skills []  Compensatory strategies    []  Speech Intelligibility []  Expressive Language []  Attention   [x]  Breath Support/Coord.  []  Receptive language []  Memory   []  Articulation []  Safety Awareness []    []  Fluency []  Word Retrieval []        Treatment Provided:  Pt engaged in sustained phonation of \"ah\" with an average of 76 dB with min cues; GOAL MET  Pt read paragraph-length material with an average of 68 dB   Pt engaged in conversation with an average of 69 dB     Patient/Caregiver  Education: [x] Review HEP      HEP/Handouts given: Continue HEP    Pain Level (0-10 scale) post treatment: 0    ASSESSMENT   []   Improving appropriately and progressing toward goals  [x]   Improving slowly and progressing toward goals  []   Approximating goals/maximum potential  [x]   Continues to benefit from skilled therapy to address remaining functional deficits  []   Not progressing toward goals and plan of care will be adjusted    Patient will continue to benefit from skilled therapy to address remaining functional deficits: Dysarthria    Progress towards goals / Updated goals:  Pt continues to make slow progress towards goals. This session, Pt met goal for maintaining sustained phonation between 76-80 dB. PLAN  [x]  Continue plan of care   []  Modify Goals/Treatment Plan      []  Discharge due to:  [] Other:    Goals for this certification period to be accomplished in 4 weeks:  1) Pt will engage in sustained phonation exercises with an average of 76-80 dB with min A in order to improve overall vocal strength and intensity for conversational exchanges.  3/1/18:GOAL MET   2) Pt will produce paragraph-length material with an average of 70-75 dB with min A in order to improve speech intensity during lengthier material. 3/1/18: 68 dB  3) Pt will engage in conversation maintaining 70-73 dB with min A in order to improve overall speech intensity during conversational exchanges with family and friends. 3/1/18: Pt engaged in conversation with an average of [address] [facility],[address], SLP 3/1/2018  2:48 PM    Future Appointments  Date Time Provider Rebecca Lyle   3/2/2018 11:30 AM TAMI [facility]   3/7/2018 12:30 PM TAMI [facility]   3/8/2018 1:15 PM TAMI [facility]

## 2018-03-02 ENCOUNTER — HOSPITAL ENCOUNTER (OUTPATIENT)
Dept: PHYSICAL THERAPY | Age: 83
Discharge: HOME | End: 2018-03-02
Payer: MEDICARE

## 2018-03-02 PROCEDURE — 92507 TX SP LANG VOICE COMM INDIV: CPT

## 2018-03-02 NOTE — PROGRESS NOTES
ST DAILY TREATMENT NOTE    Patient Name: Ivan Pinedo  Date:3/2/2018  : 1935  [x]  Patient  Verified  Payor: Payor: Dominique Hays / Plan: William Lee HMO / Product Type: HMO /   In time:11:30  Out time:12:00  Total Treatment Time (min): 30  Visit #: 6 of 8    Treatment Diagnosis: Parkinson's disease [G20]    SUBJECTIVE  Pain Level (0-10 scale): 0  Any medication changes, allergies to medications, adverse drug reactions, diagnosis change, or new procedure performed?: [x] No    [] Yes (see summary sheet for update)    Subjective functional status/changes:   [] No changes reported  \"I think I'm starting to plateau. \"    OBJECTIVE  Treatment provided includes:  Increase/Improve:  []  Voice Quality []  Cognitive Linguistic Skills []  Laryngeal/Pharyngeal Exercises   [x]  Vocal Loudness []  Reading Comprehension []  Swallowing Skills    []  Vocal Cord Function []  Auditory Comprehension []  Oral Motor Skills   []  Resonance []  Writing Skills []  Compensatory strategies    []  Speech Intelligibility []  Expressive Language []  Attention   [x]  Breath Support/Coord.  []  Receptive language []  Memory   []  Articulation []  Safety Awareness []    []  Fluency []  Word Retrieval []        Treatment Provided:  Pt read paragraph-length material with an average of 67-74 dB  Pt engaged in conversation with an average of 59-64 dB      Patient/Caregiver  Education: [x] Review HEP      HEP/Handouts given: Continue HEP    Pain Level (0-10 scale) post treatment: 0    ASSESSMENT   []   Improving appropriately and progressing toward goals  [x]   Improving slowly and progressing toward goals  []   Approximating goals/maximum potential  [x]   Continues to benefit from skilled therapy to address remaining functional deficits  []   Not progressing toward goals and plan of care will be adjusted     Patient will continue to benefit from skilled therapy to address remaining functional deficits: Dysarthria    Progress towards goals / Updated goals:  Pt is making steady progress with goals including the production of sentences and paragraphs. Poor carryover to conversational speech. PLAN  [x]  Continue plan of care  []  Modify Goals/Treatment Plan      []  Discharge due to:  [] Other:    Goals for this certification period to be accomplished in 4 weeks:  1) Pt will engage in sustained phonation exercises with an average of 76-80 dB with min A in order to improve overall vocal strength and intensity for conversational exchanges.  3/1/18:GOAL MET   2) Pt will produce paragraph-length material with an average of 70-75 dB with min A in order to improve speech intensity during lengthier material. 3/2/18: 67-74 dB  3) Pt will engage in conversation maintaining 70-73 dB with min A in order to improve overall speech intensity during conversational exchanges with family and friends. 3/2/18: Pt engaged in conversation with an average of Jefferson Nielsen, SLP 3/2/2018  11:32 AM    Future Appointments  Date Time Provider Rebecca Lyle   3/7/2018 12:30 PM TAMI [facility]   3/8/2018 1:15 PM TAMI [facility]

## 2018-03-07 ENCOUNTER — APPOINTMENT (OUTPATIENT)
Dept: PHYSICAL THERAPY | Age: 83
End: 2018-03-07
Payer: MEDICARE

## 2018-03-08 ENCOUNTER — APPOINTMENT (OUTPATIENT)
Dept: PHYSICAL THERAPY | Age: 83
End: 2018-03-08
Payer: MEDICARE

## 2018-03-09 ENCOUNTER — HOSPITAL ENCOUNTER (OUTPATIENT)
Dept: PHYSICAL THERAPY | Age: 83
Discharge: HOME | End: 2018-03-09
Payer: MEDICARE

## 2018-03-09 PROCEDURE — G9186 MOTOR SPEECH GOAL STATUS: HCPCS

## 2018-03-09 PROCEDURE — G9158 MOTOR SPEECH D/C STATUS: HCPCS

## 2018-03-09 PROCEDURE — 92507 TX SP LANG VOICE COMM INDIV: CPT

## 2018-03-09 NOTE — PROGRESS NOTES
[facility]  [address] [place], [address]  Ph [phone]  Fx [phone]    Speech Therapy Discharge Summary    Patient name: Olivia Iyer Start of Care: 2017   Referral source: Ana Lilia Narvaez DO : 1935   Medical/Treatment Diagnosis: Parkinson's disease [G20] Onset Date:     Prior Hospitalization: see medical history Provider#: [id number]   Medications: Verified on Patient Summary List    Comorbidities: Depression, Past Prostate Cancer, Past Melanoma  Prior Level of Function:Independent with ADLs    Visits from Start of Care: 15    Missed Visits: 0    Reporting Period : 18 to 18    Summary of Care: The Plan of Care and following information is based on the patient's current status:  Goal: Pt will engage in sustained phonation exercises with an average of 76-80 dB with min A in order to improve overall vocal strength and intensity  Status at last note/certification: 77 dB  Status at discharge: met    Goal: Pt will produce paragraph-length material with an average of 70-75 dB with min A in order to improve speech intensity during lengthier material  Status at last note/certification: 69 dB  Status at discharge: not met; 64-74 dB    Goal: Pt will engage in conversation maintaining 70-73 dB with min A in order to improve overall speech intensity during conversational exchanges with family and friends. Status at last note/certification: 67 dB  Status at discharge: not met; 60-64 dB    ASSESSMENT: Pt is an 82-year-old male who was receiving speech and language therapy for Dysarthria secondary to Parkinson's Disease. Pt has made slow, yet steady progress towards most goals targeting improved vocal intensity. Pt shared that he feels he has plateaud and wishes to discontinue therapy at this time, despite SLP's recommendations to continue speech therapy. Explained to Pt to contact the office if he notices any decline in speech or swallowing function.     G Codes:  Motor:  Goal  CJ= 20-39%   D/C  CJ= 20-39%      The severity rating is based on the following outcomes:    National Outcomes Measures (NOMS) and Professional Judgement    RECOMMENDATIONS:  [x]Discontinue therapy: []Patient has reached or is progressing toward set goals      [x]Patient is non-compliant or has abdicated      []Due to lack of appreciable progress towards set goals    TAMI Hall 3/9/2018 12:29 PM

## 2018-03-09 NOTE — PROGRESS NOTES
ST DAILY TREATMENT NOTE    Patient Name: Joo Concepcion  Date:3/9/2018  : 1935  [x]  Patient  Verified  Payor: Payor: Antonina Ramirez / Plan: Radha Begun HMO / Product Type: HMO /   In time:12:00  Out time:12:30  Total Treatment Time (min): 30  Visit #: 7 of 8    Treatment Diagnosis: Parkinson's disease [G20]    SUBJECTIVE  Pain Level (0-10 scale): 0  Any medication changes, allergies to medications, adverse drug reactions, diagnosis change, or new procedure performed?: [x] No    [] Yes (see summary sheet for update)    Subjective functional status/changes:   [] No changes reported  Pt wishes to be d/c. Please see d/c summary. OBJECTIVE  Treatment provided includes:  Increase/Improve:  []  Voice Quality [x]  Cognitive Linguistic Skills []  Laryngeal/Pharyngeal Exercises   [x]  Vocal Loudness []  Reading Comprehension []  Swallowing Skills    []  Vocal Cord Function []  Auditory Comprehension []  Oral Motor Skills   []  Resonance []  Writing Skills []  Compensatory strategies    []  Speech Intelligibility []  Expressive Language []  Attention   []  Breath Support/Coord.  []  Receptive language []  Memory   []  Articulation []  Safety Awareness []    []  Fluency []  Word Retrieval []        Treatment Provided:  Pt revisited HEP including exercises to increase vocal intensity  Pt provided strategies to facilitate memory in conversation and during daily tasks    Patient/Caregiver  Education: [x] Review HEP      HEP/Handouts given: Continue HEP after d/c    Pain Level (0-10 scale) post treatment: 0    ASSESSMENT   []   Improving appropriately and progressing toward goals  []   Improving slowly and progressing toward goals  []   Approximating goals/maximum potential  []   Continues to benefit from skilled therapy to address remaining functional deficits  []   Not progressing toward goals and plan of care will be adjusted      Progress towards goals / Updated goals:  Pt is an 72-year-old male who was receiving speech and language therapy for Dysarthria secondary to Parkinson's Disease. Pt has made slow, yet steady progress towards most goals targeting improved vocal intensity. Pt shared that he feels he has plateaud and wishes to discontinue therapy at this time, despite SLP's recommendations to continue speech therapy. Explained to Pt to contact the office if he notices any decline in speech or swallowing function. PLAN  []  Continue plan of care  []  Modify Goals/Treatment Plan      [x]  Discharge due to: Pt feels he is unable to achieve better results with current goals (plateau)  [] Other:    TAMI Cortés 3/9/2018  12:45 PM    No future appointments.

## 2018-12-08 ENCOUNTER — HOSPITAL ENCOUNTER (EMERGENCY)
Age: 83
Discharge: HOME | End: 2018-12-08
Attending: EMERGENCY MEDICINE
Payer: MEDICARE

## 2018-12-08 ENCOUNTER — APPOINTMENT (OUTPATIENT)
Dept: GENERAL RADIOLOGY | Age: 83
End: 2018-12-08
Attending: EMERGENCY MEDICINE
Payer: MEDICARE

## 2018-12-08 ENCOUNTER — APPOINTMENT (OUTPATIENT)
Dept: CT IMAGING | Age: 83
End: 2018-12-08
Attending: PHYSICIAN ASSISTANT
Payer: MEDICARE

## 2018-12-08 VITALS
DIASTOLIC BLOOD PRESSURE: 89 MMHG | TEMPERATURE: 99.2 F | WEIGHT: 170 LBS | BODY MASS INDEX: 25.76 KG/M2 | RESPIRATION RATE: 21 BRPM | HEART RATE: 77 BPM | HEIGHT: 68 IN | OXYGEN SATURATION: 95 % | SYSTOLIC BLOOD PRESSURE: 149 MMHG
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
## 2018-12-08 DIAGNOSIS — R03.0 ELEVATED BP WITHOUT DIAGNOSIS OF HYPERTENSION: ICD-10-CM

## 2018-12-08 DIAGNOSIS — S70.02XA CONTUSION OF LEFT HIP, INITIAL ENCOUNTER: ICD-10-CM

## 2018-12-08 LAB
ALBUMIN SERPL-MCNC: 3.6 G/DL (ref 3.4–5)
ALBUMIN/GLOB SERPL: 1.2 {RATIO} (ref 0.8–1.7)
ALP SERPL-CCNC: 64 U/L (ref 45–117)
ALT SERPL-CCNC: 12 U/L (ref 16–61)
ANION GAP SERPL CALC-SCNC: 5 MMOL/L (ref 3–18)
APPEARANCE UR: CLEAR
AST SERPL-CCNC: 20 U/L (ref 15–37)
BASOPHILS # BLD: 0 K/UL (ref 0–0.1)
BASOPHILS NFR BLD: 0 % (ref 0–2)
BILIRUB SERPL-MCNC: 0.4 MG/DL (ref 0.2–1)
BILIRUB UR QL: NEGATIVE
BUN SERPL-MCNC: 16 MG/DL (ref 7–18)
BUN/CREAT SERPL: 19
CALCIUM SERPL-MCNC: 8.1 MG/DL (ref 8.5–10.1)
CHLORIDE SERPL-SCNC: 106 MMOL/L (ref 100–108)
CK MB CFR SERPL CALC: 2.4 % (ref 0–4)
CK MB SERPL-MCNC: 4 NG/ML (ref 5–25)
CK SERPL-CCNC: 169 U/L (ref 39–308)
CO2 SERPL-SCNC: 31 MMOL/L (ref 21–32)
COLOR UR: YELLOW
CREAT SERPL-MCNC: 0.85 MG/DL (ref 0.6–1.3)
DIFFERENTIAL METHOD BLD: ABNORMAL
EOSINOPHIL # BLD: 0.1 K/UL (ref 0–0.4)
EOSINOPHIL NFR BLD: 1 % (ref 0–5)
ERYTHROCYTE [DISTWIDTH] IN BLOOD BY AUTOMATED COUNT: 12.7 % (ref 11.6–14.5)
GLOBULIN SER CALC-MCNC: 3.1 G/DL (ref 2–4)
GLUCOSE SERPL-MCNC: 91 MG/DL (ref 74–99)
GLUCOSE UR STRIP.AUTO-MCNC: NEGATIVE MG/DL
HCT VFR BLD AUTO: 45 % (ref 36–48)
HGB BLD-MCNC: 14.8 G/DL (ref 13–16)
HGB UR QL STRIP: NEGATIVE
KETONES UR QL STRIP.AUTO: NEGATIVE MG/DL
LEUKOCYTE ESTERASE UR QL STRIP.AUTO: NEGATIVE
LYMPHOCYTES # BLD: 0.7 K/UL (ref 0.9–3.6)
LYMPHOCYTES NFR BLD: 6 % (ref 21–52)
MCH RBC QN AUTO: 30.5 PG (ref 24–34)
MCHC RBC AUTO-ENTMCNC: 32.9 G/DL (ref 31–37)
MCV RBC AUTO: 92.8 FL (ref 74–97)
MONOCYTES # BLD: 0.5 K/UL (ref 0.05–1.2)
MONOCYTES NFR BLD: 4 % (ref 3–10)
NEUTS SEG # BLD: 10.4 K/UL (ref 1.8–8)
NEUTS SEG NFR BLD: 89 % (ref 40–73)
NITRITE UR QL STRIP.AUTO: NEGATIVE
PH UR STRIP: 8 [PH] (ref 5–8)
PLATELET # BLD AUTO: 174 K/UL (ref 135–420)
PMV BLD AUTO: 10 FL (ref 9.2–11.8)
POTASSIUM SERPL-SCNC: 3.9 MMOL/L (ref 3.5–5.5)
PROT SERPL-MCNC: 6.7 G/DL (ref 6.4–8.2)
PROT UR STRIP-MCNC: NEGATIVE MG/DL
RBC # BLD AUTO: 4.85 M/UL (ref 4.7–5.5)
SODIUM SERPL-SCNC: 142 MMOL/L (ref 136–145)
SP GR UR REFRACTOMETRY: 1.01 (ref 1–1.03)
TROPONIN I SERPL-MCNC: <0.02 NG/ML (ref 0–0.04)
UROBILINOGEN UR QL STRIP.AUTO: 0.2 EU/DL (ref 0.2–1)
WBC # BLD AUTO: 11.6 K/UL (ref 4.6–13.2)

## 2018-12-08 PROCEDURE — 81003 URINALYSIS AUTO W/O SCOPE: CPT

## 2018-12-08 PROCEDURE — 93005 ELECTROCARDIOGRAM TRACING: CPT

## 2018-12-08 PROCEDURE — 73502 X-RAY EXAM HIP UNI 2-3 VIEWS: CPT

## 2018-12-08 PROCEDURE — 70450 CT HEAD/BRAIN W/O DYE: CPT

## 2018-12-08 PROCEDURE — 85025 COMPLETE CBC W/AUTO DIFF WBC: CPT

## 2018-12-08 PROCEDURE — 99285 EMERGENCY DEPT VISIT HI MDM: CPT

## 2018-12-08 PROCEDURE — 80053 COMPREHEN METABOLIC PANEL: CPT

## 2018-12-08 PROCEDURE — 82553 CREATINE MB FRACTION: CPT

## 2018-12-08 PROCEDURE — 74011250637 HC RX REV CODE- 250/637: Performed by: PHYSICIAN ASSISTANT

## 2018-12-08 RX ORDER — CARBIDOPA, LEVODOPA AND ENTACAPONE 37.5; 200; 15 MG/1; MG/1; MG/1
1 TABLET, FILM COATED ORAL
COMMUNITY

## 2018-12-08 RX ORDER — ESOMEPRAZOLE MAGNESIUM 40 MG/1
40 CAPSULE, DELAYED RELEASE ORAL DAILY
COMMUNITY
End: 2018-12-14

## 2018-12-08 RX ORDER — CARBIDOPA AND LEVODOPA 25; 100 MG/1; MG/1
0.5 TABLET ORAL 4 TIMES DAILY
COMMUNITY

## 2018-12-08 RX ORDER — ASPIRIN 81 MG/1
81 TABLET ORAL DAILY
COMMUNITY
End: 2018-12-20

## 2018-12-08 RX ORDER — CYCLOSPORINE 0.5 MG/ML
1 EMULSION OPHTHALMIC EVERY 12 HOURS
COMMUNITY

## 2018-12-08 RX ORDER — RANITIDINE 150 MG/1
150 CAPSULE ORAL 2 TIMES DAILY
COMMUNITY
End: 2019-01-17

## 2018-12-08 RX ORDER — SERTRALINE HYDROCHLORIDE 50 MG/1
50 TABLET, FILM COATED ORAL DAILY
COMMUNITY

## 2018-12-08 RX ORDER — ACETAMINOPHEN 325 MG/1
975 TABLET ORAL
Status: COMPLETED | OUTPATIENT
Start: 2018-12-08 | End: 2018-12-08

## 2018-12-08 RX ADMIN — ACETAMINOPHEN 975 MG: 325 TABLET ORAL at 20:45

## 2018-12-08 NOTE — ED PROVIDER NOTES
EMERGENCY DEPARTMENT HISTORY AND PHYSICAL EXAM 
 
Date: 12/8/2018 Patient Name: Samara Phipps History of Presenting Illness Chief Complaint Patient presents with  
 Hip Pain  Fall History Provided By: Patient and Patient's Daughter Chief Complaint: hip pain Duration: this afternoon Timing:  Acute Location: left Associated Symptoms: denies any other associated signs or symptoms Additional History (Context):  
5:08 PM 
Samara Phipps is a 82 y.o. male with PMHX of Parkinson Disease who presents to the emergency department C/O left hip pain onset this afternoon s/p mechanical fall. Reports he lost his balance and fell on his left hip. Denies head injury or LOC. Able to bare weight since incident. No associated symptoms. Daughter reports he recently evaluated by Dr. Will Nicole, neuropsychology in [place], a few weeks ago for early signs of dementia vs attention seeking behavior. Pt denies numbness or tingling BLE and any other Sx or complaints. PCP: Shruthi Burk DO Past History Past Medical History: 
Past Medical History:  
Diagnosis Date  Cancer [facility])   
 prostate  Parkinson disease ([facility] [address] Past Surgical History: 
Past Surgical History:  
Procedure Laterality Date  HX HERNIA REPAIR Family History: No family history on file. Social History: 
Social History Tobacco Use  Smoking status: Never Smoker Substance Use Topics  Alcohol use: No  
  Frequency: Never  Drug use: Not on file Allergies: 
No Known Allergies Review of Systems Review of Systems Musculoskeletal: Positive for arthralgias. Neurological: Negative for numbness. (-) tingling All other systems reviewed and are negative. Physical Exam  
 
Vitals:  
 12/08/18 2000 12/08/18 2010 12/08/18 2040 12/08/18 2047 BP: 174/73 (!) 150/91  149/89 Pulse: 74 90 78 77 Resp: 17 27 14 21 Temp:    99.2 °F (37.3 °C) SpO2:   96% 95% Weight:      
Height:      
 
Physical Exam  
Constitutional: He is oriented to person, place, and time. He appears well-developed and well-nourished. Alert, lying on stretcher, NAD HENT:  
Head: Normocephalic and atraumatic. Neck: Normal range of motion. Neck supple. Cardiovascular: Normal rate, regular rhythm, normal heart sounds and intact distal pulses. No murmur heard. Pulmonary/Chest: Effort normal and breath sounds normal. No respiratory distress. He has no wheezes. He has no rales. Abdominal: Soft. Bowel sounds are normal. There is no tenderness. Musculoskeletal:  
     Legs: 
Neurological: He is alert and oriented to person, place, and time. Skin: Skin is warm and dry. Psychiatric: He has a normal mood and affect. Judgment normal.  
Nursing note and vitals reviewed. Diagnostic Study Results Labs - Recent Results (from the past 12 hour(s)) URINALYSIS W/ RFLX MICROSCOPIC Collection Time: 12/08/18  6:45 PM  
Result Value Ref Range Color YELLOW Appearance CLEAR Specific gravity 1.012 1.005 - 1.030    
 pH (UA) 8.0 5.0 - 8.0 Protein NEGATIVE  NEG mg/dL Glucose NEGATIVE  NEG mg/dL Ketone NEGATIVE  NEG mg/dL Bilirubin NEGATIVE  NEG Blood NEGATIVE  NEG Urobilinogen 0.2 0.2 - 1.0 EU/dL Nitrites NEGATIVE  NEG Leukocyte Esterase NEGATIVE  NEG    
EKG, 12 LEAD, INITIAL Collection Time: 12/08/18  6:50 PM  
Result Value Ref Range Ventricular Rate 72 BPM  
 Atrial Rate 72 BPM  
 P-R Interval 150 ms QRS Duration 88 ms Q-T Interval 384 ms QTC Calculation (Bezet) 420 ms Calculated P Axis 58 degrees Calculated R Axis 63 degrees Calculated T Axis 83 degrees Diagnosis Normal sinus rhythm with sinus arrhythmia Possible Left atrial enlargement Left ventricular hypertrophy Nonspecific T wave abnormality Abnormal ECG No previous ECGs available CBC WITH AUTOMATED DIFF  Collection Time: 12/08/18  7:00 PM  
 Result Value Ref Range WBC 11.6 4.6 - 13.2 K/uL  
 RBC 4.85 4.70 - 5.50 M/uL  
 HGB 14.8 13.0 - 16.0 g/dL HCT 45.0 36.0 - 48.0 % MCV 92.8 74.0 - 97.0 FL  
 MCH 30.5 24.0 - 34.0 PG  
 MCHC 32.9 31.0 - 37.0 g/dL  
 RDW 12.7 11.6 - 14.5 % PLATELET 103 184 - 402 K/uL MPV 10.0 9.2 - 11.8 FL  
 NEUTROPHILS 89 (H) 40 - 73 % LYMPHOCYTES 6 (L) 21 - 52 % MONOCYTES 4 3 - 10 % EOSINOPHILS 1 0 - 5 % BASOPHILS 0 0 - 2 %  
 ABS. NEUTROPHILS 10.4 (H) 1.8 - 8.0 K/UL  
 ABS. LYMPHOCYTES 0.7 (L) 0.9 - 3.6 K/UL  
 ABS. MONOCYTES 0.5 0.05 - 1.2 K/UL  
 ABS. EOSINOPHILS 0.1 0.0 - 0.4 K/UL  
 ABS. BASOPHILS 0.0 0.0 - 0.1 K/UL  
 DF AUTOMATED METABOLIC PANEL, COMPREHENSIVE Collection Time: 12/08/18  7:00 PM  
Result Value Ref Range Sodium 142 136 - 145 mmol/L Potassium 3.9 3.5 - 5.5 mmol/L Chloride 106 100 - 108 mmol/L  
 CO2 31 21 - 32 mmol/L Anion gap 5 3.0 - 18 mmol/L Glucose 91 74 - 99 mg/dL BUN 16 7.0 - 18 MG/DL Creatinine 0.85 0.6 - 1.3 MG/DL  
 BUN/Creatinine ratio 19 GFR est AA >60 >60 ml/min/1.73m2 GFR est non-AA >60 >60 ml/min/1.73m2 Calcium 8.1 (L) 8.5 - 10.1 MG/DL Bilirubin, total 0.4 0.2 - 1.0 MG/DL  
 ALT (SGPT) 12 (L) 16 - 61 U/L  
 AST (SGOT) 20 15 - 37 U/L Alk. phosphatase 64 45 - 117 U/L Protein, total 6.7 6.4 - 8.2 g/dL Albumin 3.6 3.4 - 5.0 g/dL Globulin 3.1 2.0 - 4.0 g/dL A-G Ratio 1.2 0.8 - 1.7 CARDIAC PANEL,(CK, CKMB & TROPONIN) Collection Time: 12/08/18  7:00 PM  
Result Value Ref Range  39 - 308 U/L  
 CK - MB 4.0 (H) <3.6 ng/ml CK-MB Index 2.4 0.0 - 4.0 % Troponin-I, Qt. <0.02 0.0 - 0.045 NG/ML Radiologic Studies -  
CT HEAD WO CONT Final Result IMPRESSION:  
  
  
1. No acute CVA or bleed. 2. Deep white matter changes probably representing ischemic demyelination. This  
is mild. As read by the radiologist.  
XR HIP LT W OR WO PELV 2-3 VWS    (Results Pending) 5:47 PM 
RADIOLOGY FINDINGS 
 Lt Hip X-ray shows NAP Pending review by Radiologist 
Recorded by Judah Bah ED Scribe, as dictated by Lesli De La Fuente PA-C 
 
CT Results  (Last 48 hours) 12/08/18 2041  CT HEAD WO CONT Final result Impression:  IMPRESSION:  
   
   
1. No acute CVA or bleed. 2. Deep white matter changes probably representing ischemic demyelination. This  
is mild. Narrative:  EXAM: CT head INDICATION: Dementia. Yuliet Cartagena. COMPARISON: None. TECHNIQUE: Axial CT imaging of the head was performed without intravenous  
contrast. One or more dose reduction techniques were used on this CT: automated  
exposure control, adjustment of the mAs and/or kVp according to patient size,  
and iterative reconstruction techniques. The specific techniques used on this CT exam have been documented in the patient's electronic medical record.  
   
_______________ FINDINGS:  
   
BRAIN AND POSTERIOR FOSSA: The sulci, folia, ventricles and basal cisterns are  
within normal limits for the patient's age. There is no intracranial hemorrhage,  
mass effect, or midline shift. There is subtle deep white matter hypodensity  
particularly in the occipital trigones bilaterally. EXTRA-AXIAL SPACES AND MENINGES: There are no abnormal extra-axial fluid  
collections. CALVARIUM: Intact. SINUSES: Clear. OTHER: None.  
   
_______________ CXR Results  (Last 48 hours) None Medications given in the ED- Medications  
acetaminophen (TYLENOL) tablet 975 mg (975 mg Oral Given 12/8/18 2045) Medical Decision Making I am the first provider for this patient. I reviewed the vital signs, available nursing notes, past medical history, past surgical history, family history and social history. Vital Signs-Reviewed the patient's vital signs. Pulse Oximetry Analysis - 97% on RA  
 
EKG interpretation: (Preliminary) Normal sinus rhythm with sinus arrhythmia at 72 bpm. Left ventricular hypertrophy. Nonspecific T wave abnormality. No old to compare. EKG read by Casie Menendez PA-C at 6:54 PM 
 
Records Reviewed: Nursing Notes and Old Medical Records Provider Notes (Medical Decision Making):  
 
Procedures: 
Procedures ED Course:  
5:08 PM Initial assessment performed. The patients presenting problems have been discussed, and they are in agreement with the care plan formulated and outlined with them. I have encouraged them to ask questions as they arise throughout their visit. 6:13 PM Pt blood pressure has been consistently elevated during his visit which is atypical for him. No history of HTN. Will check labs and EKG. CONSULT NOTE:  
7:19 PM 
Casie Menendez PA-C spoke with Shanice Diaz MD  
Specialty: ED attending Discussed pt's hx, disposition, and available diagnostic and imaging results. Consulting physician agrees with work up. Recommends head CT. If work up is normal and blood pressure improved without intervention, does not recommend treating and having pt follow up with PCP in 48 hours. Written by Slim Goetz ED Scribe, as dictated by Casie Menendez PA-C. Diagnosis and Disposition Discussion: Pt initially present with fall and pain to the left hip. No acute process on xray. During stay, blood pressure was elevated w/o history of HTN. Work up negative for end organ damage. Head CT normal. Blood pressure slowly corrected itself without intervention. Will instruct pt to follow up for close repeat blood pressure. DISCHARGE NOTE: 
8:58 PM 
Sola Ladd's  results have been reviewed with him. He has been counseled regarding his diagnosis, treatment, and plan. He verbally conveys understanding and agreement of the signs, symptoms, diagnosis, treatment and prognosis and additionally agrees to follow up as discussed.   He also agrees with the care-plan and conveys that all of his questions have been answered. I have also provided discharge instructions for him that include: educational information regarding their diagnosis and treatment, and list of reasons why they would want to return to the ED prior to their follow-up appointment, should his condition change. He has been provided with education for proper emergency department utilization. CLINICAL IMPRESSION: 
 
1. Fall, initial encounter 2. Contusion of left hip, initial encounter 3. Elevated BP without diagnosis of hypertension PLAN: 
1. D/C Home 2. Discharge Medication List as of 12/8/2018  8:58 PM  
  
 
3. Follow-up Information Follow up With Specialties Details Why Contact Info Ivan Bruce, DO Internal Medicine Schedule an appointment as soon as possible for a visit in 1 day For primary care follow up [address] [place] [address] 
[phone] [facility] Emergency Medicine Go to As needed, as symptoms worsen [address] [place] [id number] 
[phone]  
  
 
_______________________________ Attestations: This note is prepared by Ren Mtz, acting as Scribe for Lesli De La Fuente PA-C. Lesli De La Fuente PA-C:  The scribe's documentation has been prepared under my direction and personally reviewed by me in its entirety. I confirm that the note above accurately reflects all work, treatment, procedures, and medical decision making performed by me. 
_______________________________

## 2018-12-09 NOTE — ED NOTES
No grimacing observed. Pt being d/c home with his Daughter. D/C instructions reviewed with pt's Daughter/understanding acknowledged by her. Pt will be leaving via WC with NAD observed.

## 2018-12-09 NOTE — DISCHARGE INSTRUCTIONS
Elevated Blood Pressure: Care Instructions  Your Care Instructions    Blood pressure is a measure of how hard the blood pushes against the walls of your arteries. It's normal for blood pressure to go up and down throughout the day. But if it stays up over time, you have high blood pressure. Two numbers tell you your blood pressure. The first number is the systolic pressure. It shows how hard the blood pushes when your heart is pumping. The second number is the diastolic pressure. It shows how hard the blood pushes between heartbeats, when your heart is relaxed and filling with blood. An ideal blood pressure in adults is less than 120/80 (say \"120 over 80\"). High blood pressure is 140/90 or higher. You have high blood pressure if your top number is 140 or higher or your bottom number is 90 or higher, or both. The main test for high blood pressure is simple, fast, and painless. To diagnose high blood pressure, your doctor will test your blood pressure at different times. After testing your blood pressure, your doctor may ask you to test it again when you are home. If you are diagnosed with high blood pressure, you can work with your doctor to make a long-term plan to manage it. Follow-up care is a key part of your treatment and safety. Be sure to make and go to all appointments, and call your doctor if you are having problems. It's also a good idea to know your test results and keep a list of the medicines you take. How can you care for yourself at home? · Do not smoke. Smoking increases your risk for heart attack and stroke. If you need help quitting, talk to your doctor about stop-smoking programs and medicines. These can increase your chances of quitting for good. · Stay at a healthy weight. · Try to limit how much sodium you eat to less than 2,300 milligrams (mg) a day. Your doctor may ask you to try to eat less than 1,500 mg a day. · Be physically active.  Get at least 30 minutes of exercise on most days of the week. Walking is a good choice. You also may want to do other activities, such as running, swimming, cycling, or playing tennis or team sports. · Avoid or limit alcohol. Talk to your doctor about whether you can drink any alcohol. · Eat plenty of fruits, vegetables, and low-fat dairy products. Eat less saturated and total fats. · Learn how to check your blood pressure at home. When should you call for help? Call your doctor now or seek immediate medical care if:  ? · Your blood pressure is much higher than normal (such as 180/110 or higher). ? · You think high blood pressure is causing symptoms such as:  ¨ Severe headache. ¨ Blurry vision. ? Watch closely for changes in your health, and be sure to contact your doctor if:  ? · You do not get better as expected. Where can you learn more? Go to http://kaleRock Flow Dynamicspretty.info/. Enter C671 in the search box to learn more about \"Elevated Blood Pressure: Care Instructions. \"  Current as of: September 21, 2016  Content Version: 11.4  © 8367-9088 [employer]. Care instructions adapted under license by [employer] (which disclaims liability or warranty for this information). If you have questions about a medical condition or this instruction, always ask your healthcare professional. [address] any warranty or liability for your use of this information. Make an appointment with PCP in 1-2 days for blood pressure recheck            Hip Pain: Care Instructions  Your Care Instructions    Hip pain may be caused by many things, including overuse, a fall, or a twisting movement. Another cause of hip pain is arthritis. Your pain may increase when you stand up, walk, or squat. The pain may come and go or may be constant. Home treatment can help relieve hip pain, swelling, and stiffness. If your pain is ongoing, you may need more tests and treatment.   Follow-up care is a key part of your treatment and safety. Be sure to make and go to all appointments, and call your doctor if you are having problems. It's also a good idea to know your test results and keep a list of the medicines you take. How can you care for yourself at home? · Take pain medicines exactly as directed. ? If the doctor gave you a prescription medicine for pain, take it as prescribed. ? If you are not taking a prescription pain medicine, ask your doctor if you can take an over-the-counter medicine. · Rest and protect your hip. Take a break from any activity, including standing or walking, that may cause pain. · Put ice or a cold pack against your hip for 10 to 20 minutes at a time. Try to do this every 1 to 2 hours for the next 3 days (when you are awake) or until the swelling goes down. Put a thin cloth between the ice and your skin. · Sleep on your healthy side with a pillow between your knees, or sleep on your back with pillows under your knees. · If there is no swelling, you can put moist heat, a heating pad, or a warm cloth on your hip. Do gentle stretching exercises to help keep your hip flexible. · Learn how to prevent falls. Have your vision and hearing checked regularly. Wear slippers or shoes with a nonskid sole. · Stay at a healthy weight. · Wear comfortable shoes. When should you call for help? Call 911 anytime you think you may need emergency care. For example, call if:    · You have sudden chest pain and shortness of breath, or you cough up blood.     · You are not able to stand or walk or bear weight.     · Your buttocks, legs, or feet feel numb or tingly.     · Your leg or foot is cool or pale or changes color.     · You have severe pain.    Call your doctor now or seek immediate medical care if:    · You have signs of infection, such as:  ? Increased pain, swelling, warmth, or redness in the hip area. ? Red streaks leading from the hip area. ? Pus draining from the hip area.   ? A fever.     · You have signs of a blood clot, such as:  ? Pain in your calf, back of the knee, thigh, or groin. ? Redness and swelling in your leg or groin.     · You are not able to bend, straighten, or move your leg normally.     · You have trouble urinating or having bowel movements.    Watch closely for changes in your health, and be sure to contact your doctor if:    · You do not get better as expected. Where can you learn more? Go to http://kale-pretty.info/. Enter N169 in the search box to learn more about \"Hip Pain: Care Instructions. \"  Current as of: November 20, 2017  Content Version: 11.8  © 7342-8237 [employer]. Care instructions adapted under license by [employer] (which disclaims liability or warranty for this information). If you have questions about a medical condition or this instruction, always ask your healthcare professional. [place] [address] any warranty or liability for your use of this information. Contusion: Care Instructions  Your Care Instructions    Contusion is the medical term for a bruise. It is the result of a direct blow or an impact, such as a fall. Contusions are common sports injuries. Most people think of a bruise as a black-and-blue spot. This happens when small blood vessels get torn and leak blood under the skin. But bones, muscles, and organs can also get bruised. This may damage deep tissues but not cause a bruise you can see. The doctor will do a physical exam to find the location of your contusion. You may also have tests to make sure you do not have a more serious injury, such as a broken bone or nerve damage. These may include X-rays or other imaging tests like a CT scan or MRI. Deep-tissue contusions may cause pain and swelling. But if there is no serious damage, they will often get better in a few weeks with home treatment. The doctor has checked you carefully, but problems can develop later.  If you notice any problems or new symptoms, get medical treatment right away. Follow-up care is a key part of your treatment and safety. Be sure to make and go to all appointments, and call your doctor if you are having problems. It's also a good idea to know your test results and keep a list of the medicines you take. How can you care for yourself at home? · Put ice or a cold pack on the sore area for 10 to 20 minutes at a time to stop swelling. Put a thin cloth between the ice pack and your skin. · Be safe with medicines. Read and follow all instructions on the label. ? If the doctor gave you a prescription medicine for pain, take it as prescribed. ? If you are not taking a prescription pain medicine, ask your doctor if you can take an over-the-counter medicine. · If you can, prop up the sore area on pillows as much as possible for the next few days. Try to keep the sore area above the level of your heart. When should you call for help? Call your doctor now or seek immediate medical care if:    · Your pain gets worse.     · You have new or worse swelling.     · You have tingling, weakness, or numbness in the area near the contusion.     · The area near the contusion is cold or pale.    Watch closely for changes in your health, and be sure to contact your doctor if:    · You do not get better as expected. Where can you learn more? Go to http://kale-pretty.info/. Enter J822 in the search box to learn more about \"Contusion: Care Instructions. \"  Current as of: November 20, 2017  Content Version: 11.8  © 1920-5848 [employer]. Care instructions adapted under license by [employer] (which disclaims liability or warranty for this information). If you have questions about a medical condition or this instruction, always ask your healthcare professional. [address] any warranty or liability for your use of this information.

## 2018-12-09 NOTE — ED NOTES
Pulled Tylenol to adm. Pt is currently out of room for CT scan. Will provide upon arrival back from scan.

## 2018-12-09 NOTE — ED NOTES
CT complete. VSS. Pt medicated with the Tylenol PO to aide his LT hip pain, 7/10 per Daughter. Pt saying he had no pain at the present as the Daughter commenting that pt just told the previous nurse to have 7/10. NAD observed.

## 2018-12-10 ENCOUNTER — HOSPITAL ENCOUNTER (EMERGENCY)
Age: 83
Discharge: HOME | End: 2018-12-10
Attending: EMERGENCY MEDICINE
Payer: MEDICARE

## 2018-12-10 VITALS
OXYGEN SATURATION: 95 % | RESPIRATION RATE: 18 BRPM | HEART RATE: 72 BPM | TEMPERATURE: 98 F | SYSTOLIC BLOOD PRESSURE: 149 MMHG | DIASTOLIC BLOOD PRESSURE: 78 MMHG

## 2018-12-10 DIAGNOSIS — S76.112A STRAIN OF LEFT QUADRICEPS, INITIAL ENCOUNTER: Primary | ICD-10-CM

## 2018-12-10 PROCEDURE — 99283 EMERGENCY DEPT VISIT LOW MDM: CPT

## 2018-12-10 PROCEDURE — 74011250637 HC RX REV CODE- 250/637: Performed by: PHYSICIAN ASSISTANT

## 2018-12-10 RX ORDER — METAXALONE 400 MG/1
400 TABLET ORAL
Qty: 12 TAB | Refills: 0 | Status: ON HOLD | OUTPATIENT
Start: 2018-12-10 | End: 2019-02-04

## 2018-12-10 RX ORDER — ACETAMINOPHEN 325 MG/1
975 TABLET ORAL
Status: COMPLETED | OUTPATIENT
Start: 2018-12-10 | End: 2018-12-10

## 2018-12-10 RX ADMIN — ACETAMINOPHEN 975 MG: 325 TABLET ORAL at 09:17

## 2018-12-10 NOTE — ED PROVIDER NOTES
EMERGENCY DEPARTMENT HISTORY AND PHYSICAL EXAM 
 
Date: 12/10/2018 Patient Name: Rafiq Madrigal History of Presenting Illness Chief Complaint Patient presents with  Leg Pain LEFT History Provided By: Patient Chief Complaint: left hip pain Duration: 2 Days Timing:  Acute Location: left hip Modifying Factors: secondary to fall Associated Symptoms: denies any other associated signs or symptoms Additional History (Context):  
9:09 AM  
Rafiq Madrigal is a 82 y.o. male  who presents to the emergency department C/O left hip pain extending to the mid-thigh starting 2 days ago s/p mechanical fall when he lost his balance and fell on his left hip. He was seen here 2 days ago after the fall and was evaluated by X-ray which was negative. He was also evaluated by blood work due to elevated blood pressure and CT head, which were all negative. He states the leg feels heavy, but is able to move it. Per medical records, the daughter reports he was recently evaluated by Dr. Alycia Gray, neuropsychology in [place], a few weeks ago for early signs of dementia vs attention seeking behavior. Pt denies numbness, abdominal pain, and any other sxs or complaints. PCP: Bri Come, DO 
 
Current Outpatient Medications Medication Sig Dispense Refill  metaxalone (SKELAXIN) 400 mg tablet Take 1 Tab by mouth three (3) times daily as needed. 12 Tab 0  
 aspirin delayed-release 81 mg tablet Take 81 mg by mouth daily.  carbidopa-levodopa (SINEMET)  mg per tablet Take 1 Tab by mouth four (4) times daily.  carbidopa-levodopa-entacapone (STALEVO) 37.5-150-200 mg tablet Take 1 Tab by mouth Before breakfast, lunch, dinner and at bedtime.  cycloSPORINE (RESTASIS) 0.05 % dpet Administer 1 Drop to both eyes every twelve (12) hours.  esomeprazole (NEXIUM) 40 mg capsule Take 40 mg by mouth daily.  mirabegron ER (MYRBETRIQ) 50 mg ER tablet Take 50 mg by mouth daily.  raNITIdine hcl 150 mg capsule Take 150 mg by mouth two (2) times a day.  sertraline (ZOLOFT) 50 mg tablet Take 50 mg by mouth daily. Past History Past Medical History: 
Past Medical History:  
Diagnosis Date  Cancer [facility])   
 prostate  Parkinson disease ([facility] [address] Past Surgical History: 
Past Surgical History:  
Procedure Laterality Date  HX HERNIA REPAIR Family History: No family history on file. Social History: 
Social History Tobacco Use  Smoking status: Never Smoker Substance Use Topics  Alcohol use: No  
  Frequency: Never  Drug use: Not on file Allergies: 
No Known Allergies Review of Systems Review of Systems Gastrointestinal: Negative for abdominal pain. Musculoskeletal: Positive for arthralgias (left hip) and myalgias (left upper thigh). Neurological: Positive for weakness (left leg). Negative for numbness. All other systems reviewed and are negative. Physical Exam  
 
Vitals:  
 12/10/18 0715 BP: 153/67 Pulse: 72 Resp: 18 Temp: 98 °F (36.7 °C) SpO2: 95% Physical Exam  
Constitutional: He is oriented to person, place, and time. He appears well-developed and well-nourished. No distress. HENT:  
Head: Normocephalic and atraumatic. Cardiovascular: Normal rate, regular rhythm and normal heart sounds. Exam reveals no gallop and no friction rub. No murmur heard. Pulmonary/Chest: Effort normal and breath sounds normal. No respiratory distress. He has no wheezes. Musculoskeletal: Normal range of motion. Left upper leg: He exhibits tenderness. He exhibits no bony tenderness, no swelling and no edema. Legs: 
Tender L anterior thigh muscle without bruising, swelling or edema. Pain with flexion of hip Neurological: He is alert and oriented to person, place, and time. Skin: Skin is warm and dry. No rash noted. He is not diaphoretic. Psychiatric: He has a normal mood and affect. His behavior is normal.  
Nursing note and vitals reviewed. Diagnostic Study Results Labs - No results found for this or any previous visit (from the past 12 hour(s)). Radiologic Studies - No orders to display CT Results  (Last 48 hours) 12/08/18 2041  CT HEAD WO CONT Final result Impression:  IMPRESSION:  
   
   
1. No acute CVA or bleed. 2. Deep white matter changes probably representing ischemic demyelination. This  
is mild. Narrative:  EXAM: CT head INDICATION: Dementia. Junior Raza. COMPARISON: None. TECHNIQUE: Axial CT imaging of the head was performed without intravenous  
contrast. One or more dose reduction techniques were used on this CT: automated  
exposure control, adjustment of the mAs and/or kVp according to patient size,  
and iterative reconstruction techniques. The specific techniques used on this CT exam have been documented in the patient's electronic medical record.  
   
_______________ FINDINGS:  
   
BRAIN AND POSTERIOR FOSSA: The sulci, folia, ventricles and basal cisterns are  
within normal limits for the patient's age. There is no intracranial hemorrhage,  
mass effect, or midline shift. There is subtle deep white matter hypodensity  
particularly in the occipital trigones bilaterally. EXTRA-AXIAL SPACES AND MENINGES: There are no abnormal extra-axial fluid  
collections. CALVARIUM: Intact. SINUSES: Clear. OTHER: None.  
   
_______________ CXR Results  (Last 48 hours) None Medications given in the ED- Medications  
acetaminophen (TYLENOL) tablet 975 mg (975 mg Oral Given 12/10/18 0917) Medical Decision Making I am the first provider for this patient. I reviewed the vital signs, available nursing notes, past medical history, past surgical history, family history and social history. Vital Signs-Reviewed the patient's vital signs. Pulse Oximetry Analysis - 95% on room air Records Reviewed: Nursing Notes, Old Medical Records, Previous Radiology Studies and Previous Laboratory Studies Provider Notes (Medical Decision Making): No bone or joint tenderness. Patient Sx are vague and mild and he had hip XR done a few days ago that were normal.  No hip pain or tenderness today Procedures: 
Procedures ED Course:  
9:09 AM Initial assessment performed. The patients presenting problems have been discussed, and they are in agreement with the care plan formulated and outlined with them. I have encouraged them to ask questions as they arise throughout their visit. Diagnosis and Disposition DISCHARGE NOTE: 
9:16 AM  
Jenae Ladd's  results have been reviewed with him. He has been counseled regarding his diagnosis, treatment, and plan. He verbally conveys understanding and agreement of the signs, symptoms, diagnosis, treatment and prognosis and additionally agrees to follow up as discussed. He also agrees with the care-plan and conveys that all of his questions have been answered. I have also provided discharge instructions for him that include: educational information regarding their diagnosis and treatment, and list of reasons why they would want to return to the ED prior to their follow-up appointment, should his condition change. He has been provided with education for proper emergency department utilization. CLINICAL IMPRESSION: 
 
1. Strain of left quadriceps, initial encounter PLAN: 
1. D/C Home 2. Current Discharge Medication List  
  
START taking these medications Details  
metaxalone (SKELAXIN) 400 mg tablet Take 1 Tab by mouth three (3) times daily as needed. Qty: 12 Tab, Refills: 0  
  
  
 
3. Follow-up Information Follow up With Specialties Details Why Contact Gianluca Pérez DO Internal Medicine Schedule an appointment as soon as possible for a visit in 2 days For primary care follow up [address] [place] [address] [address] [address] 
[phone] [facility] EMERGENCY DEPT Emergency Medicine  As needed, If symptoms worsen [address] [place] [address] 
[phone]  
  
 
_______________________________ Attestations: This note is prepared by Elli Barrios, acting as Scribe for [employer], PA-C. Katie Montalvo PA-C:  The scribe's documentation has been prepared under my direction and personally reviewed by me in its entirety. I confirm that the note above accurately reflects all work, treatment, procedures, and medical decision making performed by me. 
_______________________________

## 2018-12-10 NOTE — DISCHARGE INSTRUCTIONS
Quadriceps Strain: Care Instructions  Your Care Instructions    A quadriceps strain happens when you overstretch, or pull, the quadriceps muscle. This big muscle runs down the front of your thigh. A strain can happen when you exercise or lift something or if you are injured in an accident. You may feel pain and tenderness that's worse when you move your injured leg. Your thigh may be swollen and bruised. If you have a bad strain, you may not be able to move your leg normally. A minor strain often heals well with rest and other treatment. But a severe strain may require medical treatment. If a severe strain isn't treated, you may have long-term problems. Follow-up care is a key part of your treatment and safety. Be sure to make and go to all appointments, and call your doctor if you are having problems. It's also a good idea to know your test results and keep a list of the medicines you take. How can you care for yourself at home? · Rest your injured leg. Don't put weight on it for a day or two. If your doctor advises you to, use crutches to rest the leg. · Put ice or a cold pack on the front of your thigh for 10 to 20 minutes at a time to stop swelling. Put a thin cloth between the ice and your skin. · Wrapping your thigh with an elastic bandage (such as an Ace wrap), will help decrease swelling. Don't wrap it too tightly, since this can cause more swelling below the affected area. · Elevate your thigh on pillows while applying ice and anytime you are sitting or lying down. · Ask your doctor if you can take an over-the-counter pain medicine, such as acetaminophen (Tylenol), ibuprofen (Advil, Motrin), or naproxen (Aleve). Be safe with medicines. Read and follow all instructions on the label. · Don't do anything that makes the pain worse. Return to your usual level of activity slowly. When should you call for help?   Call your doctor now or seek immediate medical care if:    · You have severe or increasing pain.     · You have tingling, weakness, or numbness in your injured leg.     · You cannot move your injured leg.    Watch closely for changes in your health, and be sure to contact your doctor if:    · You do not get better as expected. Where can you learn more? Go to http://kale-pretty.info/. Enter Q759 in the search box to learn more about \"Quadriceps Strain: Care Instructions. \"  Current as of: November 29, 2017  Content Version: 11.8  © 1834-7681 [employer]. Care instructions adapted under license by [employer] (which disclaims liability or warranty for this information). If you have questions about a medical condition or this instruction, always ask your healthcare professional. [address] any warranty or liability for your use of this information.

## 2018-12-10 NOTE — ED TRIAGE NOTES
Patient arrives via EMS from [facility] d/t continuous LEFT leg pain. Rates pain 6/10 upon ambulation with walker. Patient seen here Sat, 12/8/2018 d/t ground level fall. Negative of tests done. Patient activated EMS. Daughter, Elsi Holland, was reported that she requested patient to NOT be transported here this morning, no documents noted of POA prior to transport.

## 2018-12-10 NOTE — PROGRESS NOTES
Physical Therapy Screening: 
Services are not indicated at this time. An InBasket screening referral was triggered for physical therapy based on results obtained during the nursing admission assessment. The patients chart was reviewed and the patient is not appropriate for a skilled therapy evaluation at this time. Chart indicates pt discharge. Thank you.  
 
Alexi Owens, PT

## 2018-12-10 NOTE — ED NOTES
Patient's daughter López Liao called to notify her that patient is discharged, discharge paperwork and prescription reviewed with patient, patient waiting for daughter for ride home

## 2018-12-17 ENCOUNTER — HOSPITAL ENCOUNTER (INPATIENT)
Age: 83
LOS: 3 days | Discharge: NURSING, REHAB OR LONG-TERM CARE | DRG: 480 | End: 2018-12-20
Attending: ORTHOPAEDIC SURGERY | Admitting: ORTHOPAEDIC SURGERY
Payer: MEDICARE

## 2018-12-17 ENCOUNTER — APPOINTMENT (OUTPATIENT)
Dept: GENERAL RADIOLOGY | Age: 83
DRG: 480 | End: 2018-12-17
Attending: ORTHOPAEDIC SURGERY
Payer: MEDICARE

## 2018-12-17 ENCOUNTER — ANESTHESIA (OUTPATIENT)
Dept: SURGERY | Age: 83
DRG: 480 | End: 2018-12-17
Payer: MEDICARE

## 2018-12-17 ENCOUNTER — ANESTHESIA EVENT (OUTPATIENT)
Dept: SURGERY | Age: 83
DRG: 480 | End: 2018-12-17
Payer: MEDICARE

## 2018-12-17 ENCOUNTER — APPOINTMENT (OUTPATIENT)
Dept: GENERAL RADIOLOGY | Age: 83
DRG: 480 | End: 2018-12-17
Attending: PHYSICIAN ASSISTANT
Payer: MEDICARE

## 2018-12-17 DIAGNOSIS — S72.002A CLOSED FRACTURE OF LEFT HIP, INITIAL ENCOUNTER (HCC): Primary | ICD-10-CM

## 2018-12-17 LAB
ABO + RH BLD: NORMAL
APTT PPP: 29 SEC (ref 23–36.4)
BACTERIA SPEC CULT: NORMAL
BLOOD GROUP ANTIBODIES SERPL: NORMAL
ERYTHROCYTE [SEDIMENTATION RATE] IN BLOOD: 6 MM/HR (ref 0–20)
GLUCOSE BLD STRIP.AUTO-MCNC: 100 MG/DL (ref 70–110)
INR PPP: 1 (ref 0.8–1.2)
PROTHROMBIN TIME: 13.3 SEC (ref 11.5–15.2)
SERVICE CMNT-IMP: NORMAL
SPECIMEN EXP DATE BLD: NORMAL

## 2018-12-17 PROCEDURE — 74011250636 HC RX REV CODE- 250/636: Performed by: ORTHOPAEDIC SURGERY

## 2018-12-17 PROCEDURE — 0QH706Z INSERTION OF INTRAMEDULLARY INTERNAL FIXATION DEVICE INTO LEFT UPPER FEMUR, OPEN APPROACH: ICD-10-PCS | Performed by: ORTHOPAEDIC SURGERY

## 2018-12-17 PROCEDURE — 87641 MR-STAPH DNA AMP PROBE: CPT

## 2018-12-17 PROCEDURE — 8E0YXBF COMPUTER ASSISTED PROCEDURE OF LOWER EXTREMITY, WITH FLUOROSCOPY: ICD-10-PCS | Performed by: ORTHOPAEDIC SURGERY

## 2018-12-17 PROCEDURE — 77030037875 HC DRSG MEPILEX <16IN BORD MOLN -A: Performed by: ORTHOPAEDIC SURGERY

## 2018-12-17 PROCEDURE — 77030039267 HC ADH SKN EXOFIN S2SG -B: Performed by: ORTHOPAEDIC SURGERY

## 2018-12-17 PROCEDURE — 74011250636 HC RX REV CODE- 250/636

## 2018-12-17 PROCEDURE — 77030012893

## 2018-12-17 PROCEDURE — 74011250637 HC RX REV CODE- 250/637: Performed by: PHYSICIAN ASSISTANT

## 2018-12-17 PROCEDURE — 74011250636 HC RX REV CODE- 250/636: Performed by: PHYSICIAN ASSISTANT

## 2018-12-17 PROCEDURE — 86900 BLOOD TYPING SEROLOGIC ABO: CPT

## 2018-12-17 PROCEDURE — 77030002933 HC SUT MCRYL J&J -A: Performed by: ORTHOPAEDIC SURGERY

## 2018-12-17 PROCEDURE — C1769 GUIDE WIRE: HCPCS | Performed by: ORTHOPAEDIC SURGERY

## 2018-12-17 PROCEDURE — 74011000272 HC RX REV CODE- 272: Performed by: ORTHOPAEDIC SURGERY

## 2018-12-17 PROCEDURE — 85652 RBC SED RATE AUTOMATED: CPT

## 2018-12-17 PROCEDURE — 74011000250 HC RX REV CODE- 250: Performed by: PHYSICIAN ASSISTANT

## 2018-12-17 PROCEDURE — 76010000153 HC OR TIME 1.5 TO 2 HR: Performed by: ORTHOPAEDIC SURGERY

## 2018-12-17 PROCEDURE — 77030018673: Performed by: ORTHOPAEDIC SURGERY

## 2018-12-17 PROCEDURE — 74011000250 HC RX REV CODE- 250: Performed by: ORTHOPAEDIC SURGERY

## 2018-12-17 PROCEDURE — 74011250636 HC RX REV CODE- 250/636: Performed by: ANESTHESIOLOGY

## 2018-12-17 PROCEDURE — 77030018836 HC SOL IRR NACL ICUM -A: Performed by: ORTHOPAEDIC SURGERY

## 2018-12-17 PROCEDURE — 76210000016 HC OR PH I REC 1 TO 1.5 HR: Performed by: ORTHOPAEDIC SURGERY

## 2018-12-17 PROCEDURE — 77030035643 HC BLD SAW OSC PRECIS STRY -C: Performed by: ORTHOPAEDIC SURGERY

## 2018-12-17 PROCEDURE — 36415 COLL VENOUS BLD VENIPUNCTURE: CPT

## 2018-12-17 PROCEDURE — C1713 ANCHOR/SCREW BN/BN,TIS/BN: HCPCS | Performed by: ORTHOPAEDIC SURGERY

## 2018-12-17 PROCEDURE — 85730 THROMBOPLASTIN TIME PARTIAL: CPT

## 2018-12-17 PROCEDURE — 65270000029 HC RM PRIVATE

## 2018-12-17 PROCEDURE — 77030032490 HC SLV COMPR SCD KNE COVD -B: Performed by: ORTHOPAEDIC SURGERY

## 2018-12-17 PROCEDURE — 76060000034 HC ANESTHESIA 1.5 TO 2 HR: Performed by: ORTHOPAEDIC SURGERY

## 2018-12-17 PROCEDURE — 82962 GLUCOSE BLOOD TEST: CPT

## 2018-12-17 PROCEDURE — 85610 PROTHROMBIN TIME: CPT

## 2018-12-17 PROCEDURE — 74011250637 HC RX REV CODE- 250/637: Performed by: ANESTHESIOLOGY

## 2018-12-17 PROCEDURE — 73501 X-RAY EXAM HIP UNI 1 VIEW: CPT

## 2018-12-17 DEVICE — SCREW BNE L90MM DIA7.3MM THRD L32MM CANC S STL SELF DRL ST: Type: IMPLANTABLE DEVICE | Site: HIP | Status: FUNCTIONAL

## 2018-12-17 DEVICE — IMPLANTABLE DEVICE: Type: IMPLANTABLE DEVICE | Site: HIP | Status: FUNCTIONAL

## 2018-12-17 DEVICE — SCREW BNE L90MM DIA7.3MM THRD L16MM CANC S STL SELF DRL ST: Type: IMPLANTABLE DEVICE | Site: HIP | Status: FUNCTIONAL

## 2018-12-17 RX ORDER — CARBIDOPA AND LEVODOPA 25; 100 MG/1; MG/1
0.5 TABLET ORAL 4 TIMES DAILY
Status: DISCONTINUED | OUTPATIENT
Start: 2018-12-17 | End: 2018-12-20 | Stop reason: HOSPADM

## 2018-12-17 RX ORDER — DEXTROSE 50 % IN WATER (D50W) INTRAVENOUS SYRINGE
25-50 AS NEEDED
Status: DISCONTINUED | OUTPATIENT
Start: 2018-12-17 | End: 2018-12-17 | Stop reason: HOSPADM

## 2018-12-17 RX ORDER — SODIUM CHLORIDE 0.9 % (FLUSH) 0.9 %
5-10 SYRINGE (ML) INJECTION AS NEEDED
Status: DISCONTINUED | OUTPATIENT
Start: 2018-12-17 | End: 2018-12-20 | Stop reason: HOSPADM

## 2018-12-17 RX ORDER — RASAGILINE 1 MG/1
1 TABLET ORAL
Status: DISCONTINUED | OUTPATIENT
Start: 2018-12-17 | End: 2018-12-20 | Stop reason: HOSPADM

## 2018-12-17 RX ORDER — LABETALOL HCL 20 MG/4 ML
5 SYRINGE (ML) INTRAVENOUS ONCE
Status: COMPLETED | OUTPATIENT
Start: 2018-12-17 | End: 2018-12-17

## 2018-12-17 RX ORDER — PANTOPRAZOLE SODIUM 40 MG/1
40 TABLET, DELAYED RELEASE ORAL DAILY
Status: DISCONTINUED | OUTPATIENT
Start: 2018-12-18 | End: 2018-12-20 | Stop reason: HOSPADM

## 2018-12-17 RX ORDER — NALOXONE HYDROCHLORIDE 0.4 MG/ML
0.1 INJECTION, SOLUTION INTRAMUSCULAR; INTRAVENOUS; SUBCUTANEOUS AS NEEDED
Status: DISCONTINUED | OUTPATIENT
Start: 2018-12-17 | End: 2018-12-17 | Stop reason: HOSPADM

## 2018-12-17 RX ORDER — CEFAZOLIN SODIUM/WATER 2 G/20 ML
2 SYRINGE (ML) INTRAVENOUS ONCE
Status: COMPLETED | OUTPATIENT
Start: 2018-12-17 | End: 2018-12-17

## 2018-12-17 RX ORDER — OXYCODONE AND ACETAMINOPHEN 5; 325 MG/1; MG/1
1-2 TABLET ORAL
Status: DISCONTINUED | OUTPATIENT
Start: 2018-12-17 | End: 2018-12-19

## 2018-12-17 RX ORDER — CEFAZOLIN SODIUM/WATER 2 G/20 ML
2 SYRINGE (ML) INTRAVENOUS EVERY 8 HOURS
Status: COMPLETED | OUTPATIENT
Start: 2018-12-17 | End: 2018-12-18

## 2018-12-17 RX ORDER — SODIUM CHLORIDE, SODIUM LACTATE, POTASSIUM CHLORIDE, CALCIUM CHLORIDE 600; 310; 30; 20 MG/100ML; MG/100ML; MG/100ML; MG/100ML
75 INJECTION, SOLUTION INTRAVENOUS CONTINUOUS
Status: DISPENSED | OUTPATIENT
Start: 2018-12-17 | End: 2018-12-18

## 2018-12-17 RX ORDER — OXYCODONE HYDROCHLORIDE 5 MG/1
5 TABLET ORAL ONCE
Status: COMPLETED | OUTPATIENT
Start: 2018-12-17 | End: 2018-12-17

## 2018-12-17 RX ORDER — SODIUM CHLORIDE 0.9 % (FLUSH) 0.9 %
5-10 SYRINGE (ML) INJECTION EVERY 8 HOURS
Status: DISCONTINUED | OUTPATIENT
Start: 2018-12-17 | End: 2018-12-20 | Stop reason: HOSPADM

## 2018-12-17 RX ORDER — DOCUSATE SODIUM 100 MG/1
100 CAPSULE, LIQUID FILLED ORAL 2 TIMES DAILY
Status: DISCONTINUED | OUTPATIENT
Start: 2018-12-17 | End: 2018-12-20 | Stop reason: HOSPADM

## 2018-12-17 RX ORDER — ALBUTEROL SULFATE 0.83 MG/ML
2.5 SOLUTION RESPIRATORY (INHALATION) AS NEEDED
Status: DISCONTINUED | OUTPATIENT
Start: 2018-12-17 | End: 2018-12-17 | Stop reason: HOSPADM

## 2018-12-17 RX ORDER — DIPHENHYDRAMINE HYDROCHLORIDE 50 MG/ML
12.5 INJECTION, SOLUTION INTRAMUSCULAR; INTRAVENOUS
Status: DISCONTINUED | OUTPATIENT
Start: 2018-12-17 | End: 2018-12-17 | Stop reason: HOSPADM

## 2018-12-17 RX ORDER — HYDROMORPHONE HYDROCHLORIDE 2 MG/ML
0.5 INJECTION, SOLUTION INTRAMUSCULAR; INTRAVENOUS; SUBCUTANEOUS ONCE
Status: COMPLETED | OUTPATIENT
Start: 2018-12-17 | End: 2018-12-17

## 2018-12-17 RX ORDER — DEXAMETHASONE SODIUM PHOSPHATE 4 MG/ML
INJECTION, SOLUTION INTRA-ARTICULAR; INTRALESIONAL; INTRAMUSCULAR; INTRAVENOUS; SOFT TISSUE AS NEEDED
Status: DISCONTINUED | OUTPATIENT
Start: 2018-12-17 | End: 2018-12-17 | Stop reason: HOSPADM

## 2018-12-17 RX ORDER — CYCLOSPORINE 0.5 MG/ML
1 EMULSION OPHTHALMIC EVERY 12 HOURS
Status: DISCONTINUED | OUTPATIENT
Start: 2018-12-17 | End: 2018-12-20 | Stop reason: HOSPADM

## 2018-12-17 RX ORDER — ONDANSETRON 2 MG/ML
4 INJECTION INTRAMUSCULAR; INTRAVENOUS ONCE
Status: DISCONTINUED | OUTPATIENT
Start: 2018-12-17 | End: 2018-12-17 | Stop reason: HOSPADM

## 2018-12-17 RX ORDER — DIPHENHYDRAMINE HCL 25 MG
25 CAPSULE ORAL
Status: DISCONTINUED | OUTPATIENT
Start: 2018-12-17 | End: 2018-12-20 | Stop reason: HOSPADM

## 2018-12-17 RX ORDER — FENTANYL CITRATE 50 UG/ML
INJECTION, SOLUTION INTRAMUSCULAR; INTRAVENOUS AS NEEDED
Status: DISCONTINUED | OUTPATIENT
Start: 2018-12-17 | End: 2018-12-17 | Stop reason: HOSPADM

## 2018-12-17 RX ORDER — SODIUM CHLORIDE, SODIUM LACTATE, POTASSIUM CHLORIDE, CALCIUM CHLORIDE 600; 310; 30; 20 MG/100ML; MG/100ML; MG/100ML; MG/100ML
125 INJECTION, SOLUTION INTRAVENOUS CONTINUOUS
Status: DISCONTINUED | OUTPATIENT
Start: 2018-12-17 | End: 2018-12-20 | Stop reason: HOSPADM

## 2018-12-17 RX ORDER — LIDOCAINE HYDROCHLORIDE 20 MG/ML
INJECTION, SOLUTION EPIDURAL; INFILTRATION; INTRACAUDAL; PERINEURAL AS NEEDED
Status: DISCONTINUED | OUTPATIENT
Start: 2018-12-17 | End: 2018-12-17 | Stop reason: HOSPADM

## 2018-12-17 RX ORDER — ASPIRIN 325 MG
325 TABLET, DELAYED RELEASE (ENTERIC COATED) ORAL 2 TIMES DAILY
Status: DISCONTINUED | OUTPATIENT
Start: 2018-12-17 | End: 2018-12-20 | Stop reason: HOSPADM

## 2018-12-17 RX ORDER — MAGNESIUM SULFATE 100 %
4 CRYSTALS MISCELLANEOUS AS NEEDED
Status: DISCONTINUED | OUTPATIENT
Start: 2018-12-17 | End: 2018-12-17 | Stop reason: HOSPADM

## 2018-12-17 RX ORDER — SERTRALINE HYDROCHLORIDE 50 MG/1
50 TABLET, FILM COATED ORAL DAILY
Status: DISCONTINUED | OUTPATIENT
Start: 2018-12-18 | End: 2018-12-20 | Stop reason: HOSPADM

## 2018-12-17 RX ORDER — SODIUM CHLORIDE, SODIUM LACTATE, POTASSIUM CHLORIDE, CALCIUM CHLORIDE 600; 310; 30; 20 MG/100ML; MG/100ML; MG/100ML; MG/100ML
150 INJECTION, SOLUTION INTRAVENOUS CONTINUOUS
Status: DISCONTINUED | OUTPATIENT
Start: 2018-12-17 | End: 2018-12-17 | Stop reason: HOSPADM

## 2018-12-17 RX ORDER — ONDANSETRON 2 MG/ML
4 INJECTION INTRAMUSCULAR; INTRAVENOUS
Status: DISCONTINUED | OUTPATIENT
Start: 2018-12-17 | End: 2018-12-20 | Stop reason: HOSPADM

## 2018-12-17 RX ORDER — CARBIDOPA, LEVODOPA AND ENTACAPONE 37.5; 200; 15 MG/1; MG/1; MG/1
1 TABLET, FILM COATED ORAL
Status: DISCONTINUED | OUTPATIENT
Start: 2018-12-17 | End: 2018-12-20 | Stop reason: HOSPADM

## 2018-12-17 RX ORDER — NALOXONE HYDROCHLORIDE 0.4 MG/ML
0.4 INJECTION, SOLUTION INTRAMUSCULAR; INTRAVENOUS; SUBCUTANEOUS AS NEEDED
Status: DISCONTINUED | OUTPATIENT
Start: 2018-12-17 | End: 2018-12-20 | Stop reason: HOSPADM

## 2018-12-17 RX ORDER — RANITIDINE 150 MG/1
150 TABLET, FILM COATED ORAL 2 TIMES DAILY
Status: DISCONTINUED | OUTPATIENT
Start: 2018-12-18 | End: 2018-12-20 | Stop reason: HOSPADM

## 2018-12-17 RX ORDER — ACETAMINOPHEN 10 MG/ML
1000 INJECTION, SOLUTION INTRAVENOUS ONCE
Status: DISCONTINUED | OUTPATIENT
Start: 2018-12-17 | End: 2018-12-17 | Stop reason: HOSPADM

## 2018-12-17 RX ORDER — ONDANSETRON 2 MG/ML
INJECTION INTRAMUSCULAR; INTRAVENOUS AS NEEDED
Status: DISCONTINUED | OUTPATIENT
Start: 2018-12-17 | End: 2018-12-17 | Stop reason: HOSPADM

## 2018-12-17 RX ORDER — HYDROMORPHONE HYDROCHLORIDE 1 MG/ML
0.5 INJECTION, SOLUTION INTRAMUSCULAR; INTRAVENOUS; SUBCUTANEOUS
Status: DISCONTINUED | OUTPATIENT
Start: 2018-12-17 | End: 2018-12-19

## 2018-12-17 RX ORDER — PROPOFOL 10 MG/ML
INJECTION, EMULSION INTRAVENOUS AS NEEDED
Status: DISCONTINUED | OUTPATIENT
Start: 2018-12-17 | End: 2018-12-17 | Stop reason: HOSPADM

## 2018-12-17 RX ADMIN — SODIUM CHLORIDE, SODIUM LACTATE, POTASSIUM CHLORIDE, AND CALCIUM CHLORIDE 75 ML/HR: 600; 310; 30; 20 INJECTION, SOLUTION INTRAVENOUS at 21:24

## 2018-12-17 RX ADMIN — FENTANYL CITRATE 25 MCG: 50 INJECTION, SOLUTION INTRAMUSCULAR; INTRAVENOUS at 16:12

## 2018-12-17 RX ADMIN — ONDANSETRON 4 MG: 2 INJECTION INTRAMUSCULAR; INTRAVENOUS at 16:04

## 2018-12-17 RX ADMIN — OXYCODONE HYDROCHLORIDE 5 MG: 5 TABLET ORAL at 17:40

## 2018-12-17 RX ADMIN — CARBIDOPA AND LEVODOPA 0.5 TABLET: 25; 100 TABLET ORAL at 22:21

## 2018-12-17 RX ADMIN — LABETALOL HYDROCHLORIDE 5 MG: 5 INJECTION, SOLUTION INTRAVENOUS at 18:06

## 2018-12-17 RX ADMIN — Medication 2 G: at 15:51

## 2018-12-17 RX ADMIN — SODIUM CHLORIDE, SODIUM LACTATE, POTASSIUM CHLORIDE, AND CALCIUM CHLORIDE: 600; 310; 30; 20 INJECTION, SOLUTION INTRAVENOUS at 17:03

## 2018-12-17 RX ADMIN — DOCUSATE SODIUM 100 MG: 100 CAPSULE, LIQUID FILLED ORAL at 21:24

## 2018-12-17 RX ADMIN — FENTANYL CITRATE 25 MCG: 50 INJECTION, SOLUTION INTRAMUSCULAR; INTRAVENOUS at 16:06

## 2018-12-17 RX ADMIN — PROPOFOL 150 MG: 10 INJECTION, EMULSION INTRAVENOUS at 15:38

## 2018-12-17 RX ADMIN — HYDROMORPHONE HYDROCHLORIDE 0.5 MG: 2 INJECTION, SOLUTION INTRAMUSCULAR; INTRAVENOUS; SUBCUTANEOUS at 13:35

## 2018-12-17 RX ADMIN — FENTANYL CITRATE 25 MCG: 50 INJECTION, SOLUTION INTRAMUSCULAR; INTRAVENOUS at 16:32

## 2018-12-17 RX ADMIN — RASAGILINE 1 MG: 1 TABLET ORAL at 22:00

## 2018-12-17 RX ADMIN — FENTANYL CITRATE 25 MCG: 50 INJECTION, SOLUTION INTRAMUSCULAR; INTRAVENOUS at 16:36

## 2018-12-17 RX ADMIN — LIDOCAINE HYDROCHLORIDE 60 MG: 20 INJECTION, SOLUTION EPIDURAL; INFILTRATION; INTRACAUDAL; PERINEURAL at 15:38

## 2018-12-17 RX ADMIN — CARBIDOPA, LEVODOPA AND ENTACAPONE 1 TABLET: 37.5; 200; 15 TABLET, FILM COATED ORAL at 23:50

## 2018-12-17 RX ADMIN — DEXAMETHASONE SODIUM PHOSPHATE 4 MG: 4 INJECTION, SOLUTION INTRA-ARTICULAR; INTRALESIONAL; INTRAMUSCULAR; INTRAVENOUS; SOFT TISSUE at 15:42

## 2018-12-17 RX ADMIN — LABETALOL HYDROCHLORIDE 5 MG: 5 INJECTION, SOLUTION INTRAVENOUS at 17:34

## 2018-12-17 RX ADMIN — SODIUM CHLORIDE, SODIUM LACTATE, POTASSIUM CHLORIDE, AND CALCIUM CHLORIDE: 600; 310; 30; 20 INJECTION, SOLUTION INTRAVENOUS at 15:33

## 2018-12-17 RX ADMIN — LABETALOL HYDROCHLORIDE 5 MG: 5 INJECTION, SOLUTION INTRAVENOUS at 19:53

## 2018-12-17 RX ADMIN — SODIUM CHLORIDE, SODIUM LACTATE, POTASSIUM CHLORIDE, AND CALCIUM CHLORIDE 125 ML/HR: 600; 310; 30; 20 INJECTION, SOLUTION INTRAVENOUS at 11:42

## 2018-12-17 RX ADMIN — OXYCODONE HYDROCHLORIDE AND ACETAMINOPHEN 1 TABLET: 5; 325 TABLET ORAL at 22:49

## 2018-12-17 RX ADMIN — ASPIRIN 325 MG: 325 TABLET, DELAYED RELEASE ORAL at 21:24

## 2018-12-17 RX ADMIN — Medication 2 G: at 22:21

## 2018-12-17 NOTE — ANESTHESIA POSTPROCEDURE EVALUATION
Post-Anesthesia Evaluation and Assessment    Cardiovascular Function/Vital Signs  Visit Vitals  /88   Pulse 60   Temp 36.3 °C (97.3 °F)   Resp 12   Ht 5' 8\" (1.727 m)   Wt 77.7 kg (171 lb 6 oz)   SpO2 100%   BMI 26.06 kg/m²       Patient is status post Procedure(s):  LEFT HIP: INTRAMEDULLARY HIP PINNING WITH C-ARM. Nausea/Vomiting: Controlled. Postoperative hydration reviewed and adequate. Pain:  Pain Scale 1: Numeric (0 - 10) (12/17/18 1808)  Pain Intensity 1: 3 (12/17/18 1808)   Managed. Neurological Status:   Neuro (WDL): Exceptions to WDL (12/17/18 1126)   At baseline. Mental Status and Level of Consciousness: Baseline and stable. Pulmonary Status:   O2 Device: Nasal cannula (12/17/18 1808)   Adequate oxygenation and airway patent. Complications related to anesthesia: None    Post-anesthesia assessment completed. No concerns. Patient has met all discharge requirements.     Signed By: Nona Mercado MD

## 2018-12-17 NOTE — H&P
[address] [facility] [address] Medicine  History and Physical Exam    Patient: Bryon Smith MRN: [id number]  SSN: [id number]    YOB: 1935  Age: 82 y.o. Sex: male      Subjective:      Chief Complaint: left hip pain    History of Present Illness:  Patient complains of left hip pain and difficulty ambulating after a fall in his apartment on 12/8/18. Patient was taken to the emergency room where the fracture was not initially detected. After receiving and MRI and following in our office, the patient was found to have a left subcapital nondisplaced femoral neck fracture. Past Medical History:   Diagnosis Date    Cancer [facility])     prostate    GERD (gastroesophageal reflux disease)     [facility]     Psychiatric disorder     depression     Past Surgical History:   Procedure Laterality Date    HX CATARACT REMOVAL Bilateral     HX HERNIA REPAIR      x2    HX LAP CHOLECYSTECTOMY      HX VASECTOMY       Social History     Occupational History    Not on file   Tobacco Use    Smoking status: Never Smoker    Smokeless tobacco: Never Used   Substance and Sexual Activity    Alcohol use: No     Frequency: Never    Drug use: No    Sexual activity: Not on file     Prior to Admission medications    Medication Sig Start Date End Date Taking? Authorizing Provider   pantoprazole (PROTONIX) 40 mg tablet Take 40 mg by mouth daily. Provider, Historical   rasagiline (AZILECT) 1 mg tablet Take 1 mg by mouth nightly. Provider, Historical   metaxalone (SKELAXIN) 400 mg tablet Take 1 Tab by mouth three (3) times daily as needed. 12/10/18   JEFF Dockery   aspirin delayed-release 81 mg tablet Take 81 mg by mouth daily. Other, MD Cha   carbidopa-levodopa (SINEMET)  mg per tablet Take 0.5 Tabs by mouth four (4) times daily.     Cha Paula MD   carbidopa-levodopa-entacapone (STALEVO) 37.5-150-200 mg tablet Take 1 Tab by mouth Before breakfast, lunch, dinner and at bedtime. Cha Paula MD   cycloSPORINE (RESTASIS) 0.05 % dpet Administer 1 Drop to both eyes every twelve (12) hours. Cha Paula MD   mirabegron ER (MYRBETRIQ) 50 mg ER tablet Take 50 mg by mouth daily. Cha Paula MD   raNITIdine hcl 150 mg capsule Take 150 mg by mouth two (2) times a day. Cha Paula MD   sertraline (ZOLOFT) 50 mg tablet Take 50 mg by mouth daily. Cha Paula MD       Allergies: No Known Allergies     Review of Systems:  A comprehensive review of systems was negative except for that written in the History of Present Illness. Objective:       Physical Exam:  HEENT: Normocephalic, atraumatic  Lungs:  Clear to auscultation  Heart:   Regular rate and rhythm  Abdomen: Soft  Extremities:  Pain with range of motion of the left hip  Neurological: Grossly neurovascularly intact    Assessment:      Subcapital fracture of left femoral neck. Plan:       The patient is a candidate for surgical intervention. Proceed with scheduled left hip IM pinning vs. Left total hip arthroplasty. The various methods of treatment have been discussed with the patient and family. After consideration of risks, benefits, and other options for treatment, the patient has consented to surgical interventions. Questions were answered and preoperative teaching was done by Dr. Melba Corrales. Patient being admitted as inpatient due to his past medical history of parkinson's disease and need for admission to an inpatient rehab/SNF post-operatively due to environmental factors at home.     Signed By: Omar Singh PA-C     December 17, 2018

## 2018-12-17 NOTE — ANESTHESIA PREPROCEDURE EVALUATION
Anesthetic History   No history of anesthetic complications            Review of Systems / Medical History  Patient summary reviewed, nursing notes reviewed and pertinent labs reviewed    Pulmonary  Within defined limits                 Neuro/Psych         Psychiatric history     Cardiovascular                  Exercise tolerance: >4 METS     GI/Hepatic/Renal     GERD: well controlled           Endo/Other  Within defined limits           Other Findings              Physical Exam    Airway  Mallampati: II  TM Distance: 4 - 6 cm  Neck ROM: normal range of motion   Mouth opening: Normal     Cardiovascular  Regular rate and rhythm,  S1 and S2 normal,  no murmur, click, rub, or gallop             Dental  No notable dental hx       Pulmonary  Breath sounds clear to auscultation               Abdominal  GI exam deferred       Other Findings            Anesthetic Plan    ASA: 3  Anesthesia type: general          Induction: Intravenous  Anesthetic plan and risks discussed with: Patient

## 2018-12-17 NOTE — INTERVAL H&P NOTE
H&P Update: Coreen Sidhu was seen and examined. History and physical has been reviewed. The patient has been examined. There have been no significant clinical changes since the completion of the originally dated History and Physical.  Patient identified by surgeon; surgical site was confirmed by patient and surgeon.     Signed By: Mendoza Freed MD     December 17, 2018 2:04 PM

## 2018-12-18 LAB
ANION GAP SERPL CALC-SCNC: 7 MMOL/L (ref 3–18)
BUN SERPL-MCNC: 12 MG/DL (ref 7–18)
BUN/CREAT SERPL: 14
CALCIUM SERPL-MCNC: 8.1 MG/DL (ref 8.5–10.1)
CHLORIDE SERPL-SCNC: 105 MMOL/L (ref 100–108)
CO2 SERPL-SCNC: 26 MMOL/L (ref 21–32)
CREAT SERPL-MCNC: 0.85 MG/DL (ref 0.6–1.3)
GLUCOSE SERPL-MCNC: 116 MG/DL (ref 74–99)
HCT VFR BLD AUTO: 40 % (ref 36–48)
HGB BLD-MCNC: 13 G/DL (ref 13–16)
POTASSIUM SERPL-SCNC: 4.3 MMOL/L (ref 3.5–5.5)
SODIUM SERPL-SCNC: 138 MMOL/L (ref 136–145)

## 2018-12-18 PROCEDURE — 97167 OT EVAL HIGH COMPLEX 60 MIN: CPT

## 2018-12-18 PROCEDURE — 65270000029 HC RM PRIVATE

## 2018-12-18 PROCEDURE — 97530 THERAPEUTIC ACTIVITIES: CPT

## 2018-12-18 PROCEDURE — 97116 GAIT TRAINING THERAPY: CPT

## 2018-12-18 PROCEDURE — 97110 THERAPEUTIC EXERCISES: CPT

## 2018-12-18 PROCEDURE — 36415 COLL VENOUS BLD VENIPUNCTURE: CPT

## 2018-12-18 PROCEDURE — 97161 PT EVAL LOW COMPLEX 20 MIN: CPT

## 2018-12-18 PROCEDURE — 97535 SELF CARE MNGMENT TRAINING: CPT

## 2018-12-18 PROCEDURE — 74011250636 HC RX REV CODE- 250/636: Performed by: PHYSICIAN ASSISTANT

## 2018-12-18 PROCEDURE — 85018 HEMOGLOBIN: CPT

## 2018-12-18 PROCEDURE — 74011000250 HC RX REV CODE- 250: Performed by: PHYSICIAN ASSISTANT

## 2018-12-18 PROCEDURE — 80048 BASIC METABOLIC PNL TOTAL CA: CPT

## 2018-12-18 PROCEDURE — 74011250637 HC RX REV CODE- 250/637: Performed by: PHYSICIAN ASSISTANT

## 2018-12-18 RX ADMIN — RANITIDINE 150 MG: 150 TABLET ORAL at 08:18

## 2018-12-18 RX ADMIN — SODIUM CHLORIDE, SODIUM LACTATE, POTASSIUM CHLORIDE, AND CALCIUM CHLORIDE 75 ML/HR: 600; 310; 30; 20 INJECTION, SOLUTION INTRAVENOUS at 02:58

## 2018-12-18 RX ADMIN — CYCLOSPORINE 1 DROP: 0.5 EMULSION OPHTHALMIC at 21:00

## 2018-12-18 RX ADMIN — CYCLOSPORINE 1 DROP: 0.5 EMULSION OPHTHALMIC at 09:00

## 2018-12-18 RX ADMIN — DOCUSATE SODIUM 100 MG: 100 CAPSULE, LIQUID FILLED ORAL at 21:57

## 2018-12-18 RX ADMIN — CARBIDOPA, LEVODOPA AND ENTACAPONE 1 TABLET: 37.5; 200; 15 TABLET, FILM COATED ORAL at 11:17

## 2018-12-18 RX ADMIN — RANITIDINE 150 MG: 150 TABLET ORAL at 21:57

## 2018-12-18 RX ADMIN — OXYCODONE HYDROCHLORIDE AND ACETAMINOPHEN 1 TABLET: 5; 325 TABLET ORAL at 02:58

## 2018-12-18 RX ADMIN — CARBIDOPA AND LEVODOPA 0.5 TABLET: 25; 100 TABLET ORAL at 17:52

## 2018-12-18 RX ADMIN — RASAGILINE 1 MG: 1 TABLET ORAL at 21:58

## 2018-12-18 RX ADMIN — SERTRALINE HYDROCHLORIDE 50 MG: 50 TABLET ORAL at 08:19

## 2018-12-18 RX ADMIN — ASPIRIN 325 MG: 325 TABLET, DELAYED RELEASE ORAL at 08:19

## 2018-12-18 RX ADMIN — ASPIRIN 325 MG: 325 TABLET, DELAYED RELEASE ORAL at 21:56

## 2018-12-18 RX ADMIN — MIRABEGRON 50 MG: 25 TABLET, FILM COATED, EXTENDED RELEASE ORAL at 08:18

## 2018-12-18 RX ADMIN — OXYCODONE HYDROCHLORIDE AND ACETAMINOPHEN 1 TABLET: 5; 325 TABLET ORAL at 16:47

## 2018-12-18 RX ADMIN — CARBIDOPA AND LEVODOPA 0.5 TABLET: 25; 100 TABLET ORAL at 08:19

## 2018-12-18 RX ADMIN — CARBIDOPA, LEVODOPA AND ENTACAPONE 1 TABLET: 37.5; 200; 15 TABLET, FILM COATED ORAL at 16:43

## 2018-12-18 RX ADMIN — CARBIDOPA, LEVODOPA AND ENTACAPONE 1 TABLET: 37.5; 200; 15 TABLET, FILM COATED ORAL at 06:46

## 2018-12-18 RX ADMIN — DOCUSATE SODIUM 100 MG: 100 CAPSULE, LIQUID FILLED ORAL at 08:18

## 2018-12-18 RX ADMIN — PANTOPRAZOLE SODIUM 40 MG: 40 TABLET, DELAYED RELEASE ORAL at 08:18

## 2018-12-18 RX ADMIN — CARBIDOPA AND LEVODOPA 0.5 TABLET: 25; 100 TABLET ORAL at 22:49

## 2018-12-18 RX ADMIN — OXYCODONE HYDROCHLORIDE AND ACETAMINOPHEN 1 TABLET: 5; 325 TABLET ORAL at 08:20

## 2018-12-18 RX ADMIN — Medication 2 G: at 06:46

## 2018-12-18 RX ADMIN — CARBIDOPA AND LEVODOPA 0.5 TABLET: 25; 100 TABLET ORAL at 13:14

## 2018-12-18 RX ADMIN — CARBIDOPA, LEVODOPA AND ENTACAPONE 1 TABLET: 37.5; 200; 15 TABLET, FILM COATED ORAL at 21:58

## 2018-12-18 NOTE — ROUTINE PROCESS
Bedside and verbal shift change report given to Brandon Barney RN (oncoming nurse) by Dru Smiley RN (offgoing nurse). Report included the following information: SBAR, Kardex, MAR, and recent results.

## 2018-12-18 NOTE — PROGRESS NOTES
Progress Note     Patient: Ez Patino MRN: [id number]  SSN: [id number]    YOB: 1935  Age: 82 y.o. Sex: male      POD:    1 Day Post-Op  S/P:    Procedure(s):  LEFT HIP: INTRAMEDULLARY HIP PINNING WITH C-ARM     Subjective:   Patient is slow with answers this morning. Pt is without complaints of pain. Objective:     Patient Vitals for the past 12 hrs:   BP Temp Pulse Resp SpO2   12/18/18 0723 144/68 98.1 °F (36.7 °C) 66 15 96 %   12/18/18 0257 168/85 98.2 °F (36.8 °C) 76 14 96 %   12/18/18 0000 158/70 98 °F (36.7 °C) 64 14 99 %   12/17/18 2335 165/80 97.9 °F (36.6 °C) 65 14 99 %   12/17/18 2318 (!) 135/95 98.2 °F (36.8 °C) 75 14    12/17/18 2241 181/84       12/17/18 2200 175/82 98 °F (36.7 °C) 77 13 100 %   12/17/18 2100 (!) 148/92 98.2 °F (36.8 °C) 70 14 100 %   12/17/18 2013 162/68 97.7 °F (36.5 °C) 63 13 100 %     Recent Labs     12/18/18  0350 12/17/18  1100   HGB 13.0  --    HCT 40.0  --    INR  --  1.0     --    K 4.3  --      --    CO2 26  --    BUN 12  --    CREA 0.85  --    *  --        Pt. resting in bed. Lower extremity operative dressing clean/dry/intact. Neurovascularly intact. Calves soft, nontender. Assessment:     Awake and alert. In good spirits. Plan:   1. Continue pain management/ ice to operative area. 2. Continue to progress with PT/OT. 3. Discharge planning to inpatient rehab vs. SNF. 4. Patient to remain 15% weight bearing with walker.

## 2018-12-18 NOTE — PROGRESS NOTES
Problem: Falls - Risk of  Goal: *Absence of Falls  Document Dario Fall Risk and appropriate interventions in the flowsheet. Outcome: Progressing Towards Goal  Fall Risk Interventions:  Mobility Interventions: Utilize walker, cane, or other assistive device, Patient to call before getting OOB, Communicate number of staff needed for ambulation/transfer, PT Consult for mobility concerns, PT Consult for assist device competence    Mentation Interventions: Bed/chair exit alarm, Adequate sleep, hydration, pain control, Reorient patient    Medication Interventions: Teach patient to arise slowly, Patient to call before getting OOB, Assess postural VS orthostatic hypotension    Elimination Interventions: Call light in reach, Elevated toilet seat, Toileting schedule/hourly rounds    History of Falls Interventions: Door open when patient unattended, Bed/chair exit alarm        Problem: Pressure Injury - Risk of  Goal: *Prevention of pressure injury  Document Yan Scale and appropriate interventions in the flowsheet.   Outcome: Progressing Towards Goal  Pressure Injury Interventions:  Sensory Interventions: Assess changes in LOC, Check visual cues for pain    Moisture Interventions: Absorbent underpads    Activity Interventions: Increase time out of bed, Pressure redistribution bed/mattress(bed type), PT/OT evaluation    Mobility Interventions: HOB 30 degrees or less, Pressure redistribution bed/mattress (bed type), PT/OT evaluation    Nutrition Interventions: Document food/fluid/supplement intake

## 2018-12-18 NOTE — PROGRESS NOTES
Occupational Therapy   Spoke with JEFF De Jesus for Dr. Anitha Perez with ok for [facility] but attempts to have patient weight bear as minimal as he can and <100 ft.      Akosua Manning, OTR/L

## 2018-12-18 NOTE — PERIOP NOTES
TRANSFER - OUT REPORT:    Verbal report given to [employer] (name) on Nery Cue  being transferred to ortho (unit) for routine post - op       Report consisted of patients Situation, Background, Assessment and   Recommendations(SBAR). Information from the following report(s) SBAR, Intake/Output, MAR, Recent Results and Cardiac Rhythm NSR was reviewed with the receiving nurse. Lines:   Peripheral IV 12/17/18 Inferior; Lower; Posterior;Proximal;Right Arm (Active)   Site Assessment Clean, dry, & intact 12/17/2018  6:08 PM   Phlebitis Assessment 0 12/17/2018  6:08 PM   Infiltration Assessment 0 12/17/2018  6:08 PM   Dressing Status Clean, dry, & intact 12/17/2018  6:08 PM   Dressing Type Transparent 12/17/2018  6:08 PM   Hub Color/Line Status Green; Infusing 12/17/2018  6:08 PM   Action Taken Armboard 12/17/2018  3:56 PM        Opportunity for questions and clarification was provided.       Patient transported with:   O2 @ 2 liters

## 2018-12-18 NOTE — PROGRESS NOTES
Problem: Self Care Deficits Care Plan (Adult)  Goal: *Acute Goals and Plan of Care (Insert Text)  Initial Occupational Therapy Goals (12/18/2018) Within 7 day(s):    1. Patient will perform grooming seated EOB x 15 minutes with setup for increased independence in ADLs. 2. Patient will perform UB dressing with setup seated EOB for increased independence with ADLs. 3. Patient will perform LB dressing with Kelsie & maintaining NWB for increased independence with ADLs. 4. Patient will perform all aspects of toileting with CGA & 100% compliance with LLE NWB for increased independence with ADLs. 5. Patient will perform LB ADLs utilizing body mechanics & adaptive strategies with 1 verbal cue for increased safety in ADLs. 6. Patient will independently apply energy conservation techniques with 1 verbal cue(s)for increased independence with ADLs. 7. Patient will perform functional transfer with moderate assist % 100% compliance with LLE NWB for increased safety in ADLs. Outcome: Progressing Towards Goal  Occupational Therapy EVALUATION    Patient: Rosalina Bowen (89 y.o. male)  Date: 12/18/2018  Primary Diagnosis: FRACTURE OF UNSPECIFIED PART OF NECK OF LEFT FEMUR, INITIAL ENCOUNTER FOR CLOSED FRACTURE  Hip fracture, left ([facility])  Procedure(s) (LRB):  LEFT HIP: INTRAMEDULLARY HIP PINNING WITH C-ARM (Left) 1 Day Post-Op   Precautions:  Fall, TTWB(15% wt/bearing) WILL PLACE ON NWB as pt w/o cognition to understand 15%. (will be easier to train NWB)    ASSESSMENT :  Based on the objective data described below, the patient presents with decreased functional strength, decreased functional balance, decreased overall activity tolerance limiting independence with ADLs. Patient very pleasant w/ flat affect and dry humor. Pt w/ great UE strength and able to move to EOB w/ Supervision. Pt requesting toileting needs. Pt w/o cognitive ability to follow 15% wt/bearing.  Due to baseline cognitive deficits, pt would likely be able to follow NWB w/ repetition. Pt unable to comply w/ wt/bearing and moves habitually w/o pain and would be able to stand and walk if it weren't for wt/bearing restrictions, but d/t pt's cognition, pt requiring maxA to hold LLE to inhibit instinctive movement of LLE during ADLs. Pt also struggling w/ transition of movement, getting stuck in squatted position w/ difficulty transitioning into standing, especially w/ limited wt/b. Pt would benefit from continued OT services to maximize independence. Education: Reviewed home safety, body mechanics, importance of moving every hour to prevent joint stiffness, role of ice for edema/pain control, Rolling Walker management/safety, and adaptive dressing techniques with patient verbalizing  understanding at this time     Patient will benefit from skilled intervention to address the above impairments.   Patients rehabilitation potential is considered to be Good  Factors which may influence rehabilitation potential include:   []             None noted  [x]             Mental ability/status  [x]             Medical condition  []             Home/family situation and support systems  []             Safety awareness  []             Pain tolerance/management  []             Other:      PLAN :  Recommendations and Planned Interventions:  [x]               Self Care Training                  [x]        Therapeutic Activities  [x]               Functional Mobility Training    [x]        Cognitive Retraining  [x]               Therapeutic Exercises           [x]        Endurance Activities  [x]               Balance Training                   [x]        Neuromuscular Re-Education  [x]               Visual/Perceptual Training     [x]   Home Safety Training  [x]               Patient Education                 [x]        Family Training/Education  []               Other (comment):    Frequency/Duration: Patient will be followed by occupational therapy 2-4 times a week to address goals.  Discharge Recommendations: Rehab  Further Equipment Recommendations for Discharge: To Be Determined (TBD) at next level of care      SUBJECTIVE:   Patient stated I guess it will take some time.     OBJECTIVE DATA SUMMARY:     Past Medical History:   Diagnosis Date    Cancer ([facility]     prostate    GERD (gastroesophageal reflux disease)     Parkinson disease ([facility]     Psychiatric disorder     depression     Past Surgical History:   Procedure Laterality Date    HX CATARACT REMOVAL Bilateral     HX HERNIA REPAIR      x2    HX LAP CHOLECYSTECTOMY      HX VASECTOMY       Barriers to Learning/Limitations: yes;  cognitive, physical and altered mental status (i.e.Sedation, Confusion)  Compensate with: visual, verbal, tactile, kinesthetic cues/model    Prior Level of Function/Home Situation: Pt normally independent w/ RW w/ assist with meals and Rx? Pt is retired civil/  [address]: Independent living([address])  # Steps to Enter: 0  One/Two Story Residence: Two story(elevator)  Living Alone: Yes  Support Systems: Child(diane)  Patient Expects to be Discharged to[de-identified] Rehabilitation facility  Current DME Used/Available at Home: Walker, rolling  Tub or Shower Type: Shower  [x]  Right hand dominant   []  Left hand dominant    Cognitive/Behavioral Status:  Neurologic State: Alert;Confused; Restless  Orientation Level: Disoriented to situation;Disoriented to time;Oriented to person;Oriented to place  Cognition: Decreased attention/concentration;Decreased command following; Impulsive; Impaired decision making;Poor safety awareness;Memory loss  Safety/Judgement: Decreased awareness of need for assistance;Decreased awareness of need for safety; Lack of insight into deficits    Skin: L hip incision w/ Mepilex   Edema: compression hose in place & applied ice     Vision/Perceptual:     limited visual scanning d/t cognition      Coordination: BUE  Coordination: Within functional limits  Fine Motor Skills-Upper: Left Intact; Right Intact      Balance:  Sitting: Intact  Standing: Impaired; With support  Standing - Static: Fair(-)  Standing - Dynamic : Poor    Strength: BUE  Strength: Generally decreased, functional  Tone & Sensation: BUE  Tone: Normal  Sensation: Intact  Range of Motion: BUE  AROM: Generally decreased, functional  PROM: Generally decreased, functional    Functional Mobility and Transfers for ADLs:  Bed Mobility:  Supine to Sit: Stand-by assistance  Sit to Supine: Stand-by assistance  Scooting: Stand-by assistance;Minimum assistance(assist for LLE NWB)  Transfers:  Sit to Stand: Moderate assistance  Bed to Chair: Maximum assistance(BSC)    ADL Assessment:   Feeding: Setup    Oral Facial Hygiene/Grooming: Contact guard assistance; Additional time    Bathing: Moderate assistance    Upper Body Dressing: Setup    Lower Body Dressing: Maximum assistance    Toileting: Maximum assistance    ADL Intervention:  Feeding  Drink to Mouth: Supervision/set-up    Grooming  Washing Hands: Supervision/set-up(hand wipes)    Lower Body Dressing Assistance  Socks: Moderate assistance  Position Performed: Bending forward method    Toileting  Toileting Assistance: Maximum assistance  Bladder Hygiene: Moderate assistance  Bowel Hygiene: Maximum assistance  Clothing Management: Maximum assistance  Cues: Tactile cues provided;Verbal cues provided;Visual cues provided;Physical assistance for pants down;Physical assistance for pants up  Adaptive Equipment: [facility])    Cognitive Retraining  Orientation Retraining: Reorienting;Place;Situation;Time  Problem Solving: Inductive reason; Identifying the task; Identifying the problem;General alternative solution;Deductive reason; Awareness of environment  Executive Functions: Executing cognitive plans;Managing time;Regulating behavior  Organizing/Sequencing: Breaking task down;Prioritizing  Attention to Task: Distractibility; Single task  Maintains Attention For (Time): 30 seconds((~10-15 seconds))  Following Commands: Awareness of environment; Follows one step commands/directions  Safety/Judgement: Decreased awareness of need for assistance;Decreased awareness of need for safety; Lack of insight into deficits  Cues: Tactile cues provided;Verbal cues provided;Visual cues provided    Pain:  Pre-treatment: 0/10  Post-treatment: 0/10    Activity Tolerance:   Patient able to stand <1 minute(s). Patient able to complete ADLs with intermittent rest breaks. Patient limited by cognition/Parkinson. Patient unsteady     Please refer to the flowsheet for vital signs taken during this treatment. After treatment:   [] Patient left in no apparent distress sitting up in chair  [x] Patient left in no apparent distress in bed  [x] Call bell left within reach  [x] Nursing notified  [x] Caregiver present/son  [x] Bed alarm activated    COMMUNICATION/EDUCATION:   [x] Home safety education was provided and the patient/caregiver indicated understanding. [x] Patient/family have participated as able in goal setting and plan of care. [x] Patient/family agree to work toward stated goals and plan of care. [] Patient understands intent and goals of therapy, but is neutral about his/her participation. [] Patient is unable to participate in goal setting and plan of care. Thank you for this referral.  Akosua Manning, OTR/L  Time Calculation: 39 mins    G-Codes (GP)  Self Care   Current  CL= 60-79%   Goal  CJ= 20-39%  The severity rating is based on the professional judgement & direct observation of Level of Assistance required for Functional Mobility and ADLs. Eval Complexity: History: HIGH Complexity : Extensive review of history including physical, cognitive and psychosocial history ; Examination: HIGH Complexity : 5 or more performance deficits relating to physical, cognitive , or psychosocial skils that result in activity limitations and / or participation restrictions;    Decision Making:HIGH Complexity : Patient presents with comorbidities that affect occupational performance.  Signifigant modification of tasks or assistance (eg, physical or verbal) with assessment (s) is necessary to enable patient to complete evaluation

## 2018-12-18 NOTE — PROGRESS NOTES
Problem: Falls - Risk of  Goal: *Absence of Falls  Document Dario Fall Risk and appropriate interventions in the flowsheet.   Outcome: Progressing Towards Goal  Fall Risk Interventions:  Mobility Interventions: Patient to call before getting OOB, Strengthening exercises (ROM-active/passive), Utilize walker, cane, or other assistive device    Mentation Interventions: Adequate sleep, hydration, pain control, Door open when patient unattended, Eyeglasses and hearing aids, More frequent rounding, Update white board    Medication Interventions: Patient to call before getting OOB, Teach patient to arise slowly    Elimination Interventions: Call light in reach, Urinal in reach, Patient to call for help with toileting needs, Toileting schedule/hourly rounds    History of Falls Interventions: Bed/chair exit alarm

## 2018-12-18 NOTE — PROGRESS NOTES
2015 Assumed pt care at this time. Pt received from Shen Burton RN. Dual skin assessment performed. Fall risk bracelet intact. Pt rating pain 6/10. Pt in bed in lowest position with wheels locked. Call light within reach. Will continue to monitor. 2030 Assessment complete. Pt is alert and oriented x 2, pt is disoriented to place and situation. Bed alarm on. Denies SOB and chest pain. Pt lungs clear bilaterally. Capillary refill less than 3 seconds. Pt denies numbness and tingling in all extremities. Stated pain 6/10. Pt has 18 G IV to R arm. Pt has mepilex dressing with scant old drainage. SCD's applied to BLE. Pt refused RACHEL stockings, stated he wanted to talk to his daughter about them. Pt encouraged to continue use of IS. Pt verbalized understanding. Ice pack applied. Call light and possessions within reach. Bed in lowest position. Will continue to monitor. 2249 Pt rating pain 6/10. Administered 1 tab Percocet prn. Educated pt on side effects and gave him a highlighted side effects sheet. 200 Spoke with pt's daughter about bringing in Stalevo medication. 0245 CHG wipes performed with Laura Cardona. 0258 Pt rating pain 6/10. Administered 1 tab Percocet prn. Educated pt on side effects and gave him a highlighted side effects sheet. Shift summary: Pt is alert and oriented x 2. Pt had an uneventful shift. Bed alarm on throughout shift. Pt voiding sufficient amounts. Pain controlled by PRN medication.     Daughter Cash Manual [phone]

## 2018-12-18 NOTE — PROGRESS NOTES
Problem: Mobility Impaired (Adult and Pediatric)  Goal: *Acute Goals and Plan of Care (Insert Text)  Physical Therapy Goals   Initiated 12/18/2018 and to be accomplished within 3-5 day(s)  1. Patient will move from supine <> sit with S in prep for out of bed activity and change of position. 2.  Patient will perform sit<> stand with S while maintain 15% WBing precaution with LRAD in prep for transfers/ambulation. 3.  Patient will transfer from bed <> chair with S while maintain 15% WBing precaution with LRAD for time up in chair for completion of ADL activity. 4.  Patient will ambulate >10 feet with LRAD/S while maintaining 15% WBing precaution for improved functional mobility/safe discharge. Outcome: Progressing Towards Goal  physical Therapy EVALUATION    Patient: Dominick Gregorio (79 y.o. male)  Date: 12/18/2018  Primary Diagnosis: FRACTURE OF UNSPECIFIED PART OF NECK OF LEFT FEMUR, INITIAL ENCOUNTER FOR CLOSED FRACTURE  Hip fracture, left ([facility])  Procedure(s) (LRB):  LEFT HIP: INTRAMEDULLARY HIP PINNING WITH C-ARM (Left) 1 Day Post-Op   Precautions: Fall, PWB(15% WBing)    ASSESSMENT :  Based on the objective data described below, the patient presents with decrease independence w/ bed mobility, transfers, gait, and step negotiation. Pt seen in supine prior to session w/ IV connected and B/L SCDs donned. Pt is only alert to self and place at this time. Pt has a hx of PD. Pt requires additional time to process verbal commands for mobility. Per pts family, pt has not been able to perform any IADLs independently for some time and requires assistance. Pt has not been eating while at home and per daughter she comes to his home to make sure he goes downstairs to his independent living facility to eat breakfast, lunch, and dinner. Pt able to stand w/ RW/GB w/ manual cueing and VCing to maintain w/malia precautions. Pt able to maintain precautions at times but requires constant VCing.  Pt only able to shuffle towards the [facility] but unable to maintain precautions during task so pt was transferred back to sitting. Pt left siting at the EOB after session, call bell and tray in reach, son present in the room, bed alarm donned, nurse notified after session. Patient will benefit from skilled intervention to address the above impairments. Patients rehabilitation potential is considered to be Good  Factors which may influence rehabilitation potential include:   []         None noted  []         Mental ability/status  []         Medical condition  []         Home/family situation and support systems  []         Safety awareness  []         Pain tolerance/management  []         Other:      PLAN :  Recommendations and Planned Interventions:  []           Bed Mobility Training             []    Neuromuscular Re-Education  []           Transfer Training                   []    Orthotic/Prosthetic Training  []           Gait Training                          []    Modalities  []           Therapeutic Exercises          []    Edema Management/Control  []           Therapeutic Activities            []    Patient and Family Training/Education  []           Other (comment):    Frequency/Duration: Patient will be followed by physical therapy twice daily to address goals. Discharge Recommendations: Rehab  Further Equipment Recommendations for Discharge: rolling walker     SUBJECTIVE:   Patient stated I feel okay.     OBJECTIVE DATA SUMMARY:     Past Medical History:   Diagnosis Date    Cancer ([facility]     prostate    GERD (gastroesophageal reflux disease)     Parkinson disease ([facility]     Psychiatric disorder     depression     Past Surgical History:   Procedure Laterality Date    HX CATARACT REMOVAL Bilateral     HX HERNIA REPAIR      x2    HX LAP CHOLECYSTECTOMY      HX VASECTOMY       Barriers to Learning/Limitations: yes;  physical  Compensate with: Verbal Cues and Tactile Cues  Prior Level of Function/Home Situation:   Home Situation  Home Environment: Independent living  # Steps to Enter: [facility])  One/Two Story Residence: Two story(elevator)  Living Alone: Yes  Support Systems: Child(diane)  Patient Expects to be Discharged to[de-identified] Rehabilitation facility  Current DME Used/Available at Home: [place] Behavior:  Neurologic State: Alert;Confused  Orientation Level: Oriented to person;Oriented to place  Psychosocial  Purposeful Interaction: Yes  Pt Identified Daily Priority: Clinical issues (comment)  [employer] Process: Establish trust;Teaching/learning; Attend basic human needs  Caring Interventions: Reassure  Reassure: Therapeutic listening; Informing; Acceptance  Skin Condition/Temp: Warm;Dry  Skin Integrity: Incision (comment)  Skin Integumentary  Skin Color: Appropriate for ethnicity  Skin Condition/Temp: Warm;Dry  Skin Integrity: Incision (comment)  Turgor: Non-tenting  Hair Growth: Present  Varicosities: Absent  Strength:    Strength: Within functional limits  Tone & Sensation:   Tone: Abnormal  Sensation: Intact  Range Of Motion:  AROM: Generally decreased, functional  PROM: Generally decreased, functional  Functional Mobility:  Bed Mobility:  Supine to Sit: Minimum assistance    Scooting: Contact guard assistance  Transfers:  Sit to Stand: Minimum assistance(Bed elevated for assistance)  Stand to Sit: Contact guard assistance;Minimum assistance  Balance:   Sitting: Intact  Standing: Impaired; With support  Standing - Static: Fair  Standing - Dynamic : Fair(-)  Ambulation/Gait Training:  Distance (ft): 1 Feet (ft)  Assistive Device: Gait belt;Walker, rolling  Ambulation - Level of Assistance: Maximum assistance  Gait Description (WDL): Exceptions to WDL  Gait Abnormalities: Decreased step clearance;Shuffling gait; Step to gait  Left Side Weight Bearing: Partial (%)(15% W/malia)  Base of Support: Narrowed; Shift to right  Stance: Right decreased;Time  Speed/Estella: Shuffled; Slow  Step Length: Left shortened;Right shortened  Swing Pattern: Left asymmetrical;Right asymmetrical    Pain:  Pain Scale 1: Numeric (0 - 10)  Pain Intensity 1: 6  Pain Location 1: Hip  Pain Orientation 1: Left  Pain Description 1: Aching  Pain Intervention(s) 1: Medication (see MAR)  Activity Tolerance:   Fair  Please refer to the flowsheet for vital signs taken during this treatment. After treatment:   []         Patient left in no apparent distress sitting up in chair  [x]         Patient left in no apparent distress in bed  [x]         Call bell left within reach  [x]         Nursing notified  []         Caregiver present  []         Bed alarm activated    COMMUNICATION/EDUCATION:   [x]         Fall prevention education was provided and the patient/caregiver indicated understanding. [x]         Patient/family have participated as able in goal setting and plan of care. [x]         Patient/family agree to work toward stated goals and plan of care. []         Patient understands intent and goals of therapy, but is neutral about his/her participation. []         Patient is unable to participate in goal setting and plan of care. Thank you for this referral.  Zoltan Osei, PT   Time Calculation: 27 mins   Mobility:  Current  CJ= 20-39%   Goal  CI= 1-19%. The severity rating is based on the Other Based on functional assessment

## 2018-12-18 NOTE — PROGRESS NOTES
0715 Assumed care of pt at this time. Pt in bed with no signs of distress. Pt left with call light within reach and encouraged to call for assistance. 0750 Head to toe assessment completed at this time  Patient is A&OX2, confuse. Pt denies N/V chest pain and SOB or distress. Pt is calm and cooperative. Pedal pulses are present. Capillary refill less than 3 seconds. Skin in warm and dry with mepilex dressing on Lt hip and is CDI. Lungs are clear bilaterally. Patient instructed on use and reason for incentive spirometer. Bowel sounds are active. Abdomen is soft and non-tender. SCDS in place bilaterally . Positive dorsalis pedis pulse, sensation, warm. No tingling or numbness to lower extremities. 18 g needle in the RA. Pain scale explained to patient. Reasons for taking PRN meds explained to patient. Patient instructed to call for prn when needed. Pain level is 7. Patient was oriented to call bell and bed function. Will continue to monitor    0820 Pt state pain as 7/10. Pt received medication as per MAR. Potential medication side effects explained to patient, patient verbalizes understanding, opportunities for questions provided. Patient stable, no apparent distress at this time, bed in locked position, call bell within reach. 1500 Reassessment done, family at bedside, bed alarm on , no complain and concern at this time. 1647 Pt state pain as 6/10. Pt received medication as per MAR. Potential medication side effects explained to patient, patient verbalizes understanding, opportunities for questions provided. Patient stable, no apparent distress at this time, bed in locked position, call bell within reach.

## 2018-12-18 NOTE — ROUTINE PROCESS
TRANSFER - IN REPORT:    Verbal report received from Serafin Kothari RN (name) on Alix Adhikari  being received from PACU (unit) for routine post - op      Report consisted of patients Situation, Background, Assessment and   Recommendations(SBAR). Information from the following report(s) SBAR, Kardex, OR Summary, Procedure Summary, Intake/Output, MAR, Med Rec Status and Procedure Verification was reviewed with the receiving nurse. Opportunity for questions and clarification was provided. Assessment completed upon patients arrival to unit and care assumed.

## 2018-12-18 NOTE — PROGRESS NOTES
Reason for Admission:   Left IM hip pinning                   RRAT Score:      8               Plan for utilizing home health:    n/a                      Likelihood of Readmission:   yellow                         Transition of Care Plan:  Chart reviewed, met with pt and son in room. Pt admitted from [address] [place] [address]. Noted limited WB status, PT recs for rehab. Son advised CM to speak with daughter Henri Hernández - VM left and awaiting call back to discuss discharge planning. 1508- for continuity of care, referrals placed to [address] [place] [address], [facility]. Will continue to follow. 1- spoke with daughter who confirms [facility] choice for SNF; following to finalize plan. Care Management Interventions  PCP Verified by CM:  Yes  Physical Therapy Consult: Yes  Occupational Therapy Consult: Yes  Current Support Network: Assisted Living  Confirm Follow Up Transport: Family  Plan discussed with Pt/Family/Caregiver: Yes  Discharge Location  Discharge Placement: Skilled nursing facility

## 2018-12-18 NOTE — PROGRESS NOTES
Problem: Mobility Impaired (Adult and Pediatric)  Goal: *Acute Goals and Plan of Care (Insert Text)  Physical Therapy Goals   Initiated 12/18/2018 and to be accomplished within 3-5 day(s)  1. Patient will move from supine <> sit with S in prep for out of bed activity and change of position. 2.  Patient will perform sit<> stand with S while maintain 15% WBing precaution with LRAD in prep for transfers/ambulation. 3.  Patient will transfer from bed <> chair with S while maintain 15% WBing precaution with LRAD for time up in chair for completion of ADL activity. 4.  Patient will ambulate >10 feet with LRAD/S while maintaining 15% WBing precaution for improved functional mobility/safe discharge. Outcome: Progressing Towards Goal  physical Therapy TREATMENT    Patient: Neel Sarabia (72 y.o. male)  Date: 12/18/2018  Diagnosis: FRACTURE OF UNSPECIFIED PART OF NECK OF LEFT FEMUR, INITIAL ENCOUNTER FOR CLOSED FRACTURE  Hip fracture, left (HCC) <principal problem not specified>  Procedure(s) (LRB):  LEFT HIP: INTRAMEDULLARY HIP PINNING WITH C-ARM (Left) 1 Day Post-Op  Precautions: Fall, PWB(15% WBing)   Chart, physical therapy assessment, plan of care and goals were reviewed. PLOF: independent, ambulatory without AD, has a RW  ASSESSMENT:  Pt having difficulty following commands and increased time needed for processing of information. No difficulty sitting up EOB, no assistance needed. Increased difficulty with sit to stand, pushing posteriorly, manual assistance needed to shift COG over toes. When attempted to take side steps or swivel on the unaffected LE pt placing all wt through UE on RW with (B) feet off the floor. Daughter assisted with manual cuing for swiveling on RLE up to [facility]. Pt having a lot of difficulty managing RW. Performed seated and supine ROM strengthening exercises on the LLE. No assistance needed to returned to supine in bed.       Education: UE placement for sit to stand, RW mgmt and safety, PWB, ROM TE  Progression toward goals:  []      Improving appropriately and progressing toward goals  [x]      Improving slowly and progressing toward goals  []      Not making progress toward goals and plan of care will be adjusted     PLAN:  Patient continues to benefit from skilled intervention to address the above impairments. Continue treatment per established plan of care. Discharge Recommendations:  Gino Guajardo  Further Equipment Recommendations for Discharge:  Has a RW     SUBJECTIVE:   Patient stated Anne Whiteside.     OBJECTIVE DATA SUMMARY:   Critical Behavior:  Neurologic State: Alert, Confused  Orientation Level: Oriented to person, Oriented to place  Functional Mobility Training:  Bed Mobility:  Supine to Sit: Stand-by assistance  Sit to Supine: Stand-by assistance  Scooting: Contact guard assistance  Transfers:  Sit to Stand: Moderate assistance  Stand to Sit: Minimum assistance  Balance:  Sitting: Intact  Standing: Impaired; With support  Standing - Static: Fair(-)  Standing - Dynamic : Poor  Ambulation/Gait Training:  Distance (ft): 1 Feet (ft)  Assistive Device: Gait belt;Walker, rolling  Ambulation - Level of Assistance: Maximum assistance  Gait Description (WDL): Exceptions to WDL  Gait Abnormalities: Decreased step clearance;Shuffling gait; Step to gait  Left Side Weight Bearing: Partial (%)(15%)  Base of Support: Narrowed; Shift to right  Stance: Right decreased;Time  Speed/Estella: Delayed  Step Length: Left shortened;Right shortened  Swing Pattern: Left asymmetrical;Right asymmetrical  GCODE:Mobility  Current  CK= 40-59%   Goal  CI= 1-19%. The severity rating is based on the Level of Assistance required for Functional Mobility and ADLs. Therapeutic Exercises:   LAQ, HS, SLR, hip abd/add 10x each  Pain:  Pre:0  Post:0  Pain Scale 1: Numeric (0 - 10)    Activity Tolerance:   Fair  Please refer to the flowsheet for vital signs taken during this treatment.   After treatment:   [] Patient left in no apparent distress sitting up in chair  [x] Patient left in no apparent distress in bed  [x] Call bell left within reach  [] Nursing notified  [] Caregiver present  [x] Bed alarm activated      Jacinta Bose PTA   Time Calculation: 24 mins

## 2018-12-18 NOTE — PROGRESS NOTES
Transferred Pt to 1, Mikhail Porter RN at bedside. Dressing CDI. Left pt stable. dual skin assessment complete     12/17/18 2013   Vital Signs   Temp 97.7 °F (36.5 °C)   Temp Source Temporal   Pulse (Heart Rate) 63   Resp Rate 13   /68   Oxygen Therapy   O2 Sat (%) 100 %

## 2018-12-18 NOTE — PROGRESS NOTES
Progress Note     Patient: Randal Clark MRN: [id number]  SSN: [id number]    YOB: 1935  Age: 82 y.o. Sex: male      POD:    1 Day Post-Op  S/P:    Procedure(s):  LEFT HIP: INTRAMEDULLARY HIP PINNING WITH C-ARM     Subjective:   Pt is without complaints of pain. Objective:     Patient Vitals for the past 12 hrs:   BP Temp Pulse Resp SpO2   12/18/18 0257 168/85 98.2 °F (36.8 °C) 76 14 96 %   12/18/18 0000 158/70 98 °F (36.7 °C) 64 14 99 %   12/17/18 2335 165/80 97.9 °F (36.6 °C) 65 14 99 %   12/17/18 2318 (!) 135/95 98.2 °F (36.8 °C) 75 14    12/17/18 2241 181/84       12/17/18 2200 175/82 98 °F (36.7 °C) 77 13 100 %   12/17/18 2100 (!) 148/92 98.2 °F (36.8 °C) 70 14 100 %   12/17/18 2013 162/68 97.7 °F (36.5 °C) 63 13 100 %   12/17/18 2002   61 11 100 %   12/17/18 2001  98 °F (36.7 °C) 63 12 100 %   12/17/18 1955 169/69  65 14 100 %   12/17/18 1950 168/68  76 10 100 %   12/17/18 1940 166/75  64 13 100 %   12/1935   60 12 100 %   12/17/18 1935 173/75  61 12 100 %   12/17/18 1934   60 10 100 %   12/17/18 1933   60 11 100 %   12/17/18 1931   63 11 100 %   12/17/18 1930 165/65  65 18 100 %   12/17/18 1910 174/84  (!) 58 9 100 %     Recent Labs     12/18/18  0350 12/17/18  1100   HGB 13.0  --    HCT 40.0  --    INR  --  1.0     --    K 4.3  --      --    CO2 26  --    BUN 12  --    CREA 0.85  --    *  --        Pt resting in bed. Lower extremity operative dressing clean/dry/intact. Neurovascularly intact. Assessment:     Awake and alert. In good spirits. X rays satisfactory. Plan:   1. Continue pain management/ ice to operative area. 2. Continue to progress with PT/OT. 3. Discharge planning to skilled nursing facility vs. inpatient rehab.

## 2018-12-19 ENCOUNTER — APPOINTMENT (OUTPATIENT)
Dept: CT IMAGING | Age: 83
DRG: 480 | End: 2018-12-19
Attending: HOSPITALIST
Payer: MEDICARE

## 2018-12-19 PROBLEM — G93.41 METABOLIC ENCEPHALOPATHY: Status: ACTIVE | Noted: 2018-12-19

## 2018-12-19 PROBLEM — G20 PARKINSON DISEASE (HCC): Status: ACTIVE | Noted: 2018-12-19

## 2018-12-19 LAB
ANION GAP SERPL CALC-SCNC: 5 MMOL/L (ref 3–18)
BUN SERPL-MCNC: 13 MG/DL (ref 7–18)
BUN/CREAT SERPL: 16
CALCIUM SERPL-MCNC: 8 MG/DL (ref 8.5–10.1)
CHLORIDE SERPL-SCNC: 106 MMOL/L (ref 100–108)
CO2 SERPL-SCNC: 31 MMOL/L (ref 21–32)
CREAT SERPL-MCNC: 0.83 MG/DL (ref 0.6–1.3)
GLUCOSE SERPL-MCNC: 88 MG/DL (ref 74–99)
HCT VFR BLD AUTO: 40.4 % (ref 36–48)
HGB BLD-MCNC: 13 G/DL (ref 13–16)
POTASSIUM SERPL-SCNC: 3.8 MMOL/L (ref 3.5–5.5)
SODIUM SERPL-SCNC: 142 MMOL/L (ref 136–145)

## 2018-12-19 PROCEDURE — 70450 CT HEAD/BRAIN W/O DYE: CPT

## 2018-12-19 PROCEDURE — 36415 COLL VENOUS BLD VENIPUNCTURE: CPT

## 2018-12-19 PROCEDURE — 74011000250 HC RX REV CODE- 250: Performed by: PHYSICIAN ASSISTANT

## 2018-12-19 PROCEDURE — 80048 BASIC METABOLIC PNL TOTAL CA: CPT

## 2018-12-19 PROCEDURE — 74011250637 HC RX REV CODE- 250/637: Performed by: PHYSICIAN ASSISTANT

## 2018-12-19 PROCEDURE — 77030033269 HC SLV COMPR SCD KNE2 CARD -B

## 2018-12-19 PROCEDURE — 74011250636 HC RX REV CODE- 250/636: Performed by: PHYSICIAN ASSISTANT

## 2018-12-19 PROCEDURE — 65270000029 HC RM PRIVATE

## 2018-12-19 PROCEDURE — 85018 HEMOGLOBIN: CPT

## 2018-12-19 RX ORDER — ASPIRIN 325 MG
325 TABLET, DELAYED RELEASE (ENTERIC COATED) ORAL 2 TIMES DAILY
Qty: 60 TAB | Refills: 0 | Status: SHIPPED | OUTPATIENT
Start: 2018-12-19

## 2018-12-19 RX ORDER — ACETAMINOPHEN 325 MG/1
650 TABLET ORAL
Status: DISCONTINUED | OUTPATIENT
Start: 2018-12-19 | End: 2018-12-20 | Stop reason: HOSPADM

## 2018-12-19 RX ORDER — OXYCODONE AND ACETAMINOPHEN 5; 325 MG/1; MG/1
1 TABLET ORAL
Status: DISCONTINUED | OUTPATIENT
Start: 2018-12-19 | End: 2018-12-20 | Stop reason: HOSPADM

## 2018-12-19 RX ORDER — OXYCODONE AND ACETAMINOPHEN 5; 325 MG/1; MG/1
1-2 TABLET ORAL
Qty: 45 TAB | Refills: 0 | Status: SHIPPED | OUTPATIENT
Start: 2018-12-19 | End: 2019-01-17

## 2018-12-19 RX ORDER — LORAZEPAM 2 MG/ML
1 INJECTION INTRAMUSCULAR
Status: DISCONTINUED | OUTPATIENT
Start: 2018-12-19 | End: 2018-12-19

## 2018-12-19 RX ORDER — ACETAMINOPHEN 325 MG/1
650 TABLET ORAL
Status: DISCONTINUED | OUTPATIENT
Start: 2018-12-19 | End: 2018-12-19

## 2018-12-19 RX ADMIN — CARBIDOPA AND LEVODOPA 0.5 TABLET: 25; 100 TABLET ORAL at 22:10

## 2018-12-19 RX ADMIN — DOCUSATE SODIUM 100 MG: 100 CAPSULE, LIQUID FILLED ORAL at 20:25

## 2018-12-19 RX ADMIN — LORAZEPAM 1 MG: 2 INJECTION INTRAMUSCULAR; INTRAVENOUS at 13:25

## 2018-12-19 RX ADMIN — CARBIDOPA AND LEVODOPA 0.5 TABLET: 25; 100 TABLET ORAL at 12:00

## 2018-12-19 RX ADMIN — CARBIDOPA AND LEVODOPA 0.5 TABLET: 25; 100 TABLET ORAL at 17:05

## 2018-12-19 RX ADMIN — CYCLOSPORINE 1 DROP: 0.5 EMULSION OPHTHALMIC at 08:12

## 2018-12-19 RX ADMIN — CARBIDOPA, LEVODOPA AND ENTACAPONE 1 TABLET: 37.5; 200; 15 TABLET, FILM COATED ORAL at 22:09

## 2018-12-19 RX ADMIN — ASPIRIN 325 MG: 325 TABLET, DELAYED RELEASE ORAL at 08:11

## 2018-12-19 RX ADMIN — RASAGILINE 1 MG: 1 TABLET ORAL at 22:10

## 2018-12-19 RX ADMIN — CARBIDOPA, LEVODOPA AND ENTACAPONE 1 TABLET: 37.5; 200; 15 TABLET, FILM COATED ORAL at 08:12

## 2018-12-19 RX ADMIN — CYCLOSPORINE 1 DROP: 0.5 EMULSION OPHTHALMIC at 21:00

## 2018-12-19 RX ADMIN — DOCUSATE SODIUM 100 MG: 100 CAPSULE, LIQUID FILLED ORAL at 08:11

## 2018-12-19 RX ADMIN — RANITIDINE 150 MG: 150 TABLET ORAL at 08:11

## 2018-12-19 RX ADMIN — CARBIDOPA AND LEVODOPA 0.5 TABLET: 25; 100 TABLET ORAL at 08:11

## 2018-12-19 RX ADMIN — OXYCODONE HYDROCHLORIDE AND ACETAMINOPHEN 1 TABLET: 5; 325 TABLET ORAL at 09:13

## 2018-12-19 RX ADMIN — SERTRALINE HYDROCHLORIDE 50 MG: 50 TABLET ORAL at 08:11

## 2018-12-19 RX ADMIN — OXYCODONE HYDROCHLORIDE AND ACETAMINOPHEN 1 TABLET: 5; 325 TABLET ORAL at 03:44

## 2018-12-19 RX ADMIN — MIRABEGRON 50 MG: 25 TABLET, FILM COATED, EXTENDED RELEASE ORAL at 08:11

## 2018-12-19 RX ADMIN — ASPIRIN 325 MG: 325 TABLET, DELAYED RELEASE ORAL at 20:25

## 2018-12-19 RX ADMIN — CARBIDOPA, LEVODOPA AND ENTACAPONE 1 TABLET: 37.5; 200; 15 TABLET, FILM COATED ORAL at 17:05

## 2018-12-19 RX ADMIN — PANTOPRAZOLE SODIUM 40 MG: 40 TABLET, DELAYED RELEASE ORAL at 08:11

## 2018-12-19 RX ADMIN — RANITIDINE 150 MG: 150 TABLET ORAL at 20:25

## 2018-12-19 RX ADMIN — CARBIDOPA, LEVODOPA AND ENTACAPONE 1 TABLET: 37.5; 200; 15 TABLET, FILM COATED ORAL at 11:57

## 2018-12-19 NOTE — CDMP QUERY
The medical record reflects the following:  Risk:  83 yo s/p L hip pinning 12/17, hx Parkinson's   Clinical Indicators:  --11/16 [facility] Neurology Office note per Gisela Huizar MD \"HIGHER INTEGRATIVE FUNCTIONS: -Normal attention span and concentration; immediately responds to questions and commands but with short answers -Oriented to time, place and person -Recent and remote memory seems impaired-Language normal (no aphasia or dysarthria); some hypophonia -Normal fund of knowledge MCI (mild cognitive impairment)\"  Has some difficulty with insight and formal neuropsych testing does not feel he is safe for independent living\"  --12/17 RN:  \"Pt is alert and oriented x 2\"  --12/19 RN:  \"Pt alert and oriented to person and time disoriented to place and situation. \"  --12/17 Dilaudid, 12/17- 12/19 Percocet PRN pain  Treatment: Fall risk intervention by RN including: Bed/chair exit alarm, Toileting schedule/hourly rounds    Please clarify if this patient is being treated/managed for:  =>Encephalopathy due to pain meds (orientation decreased from baseline MCI)  =>Encephalopathy due to other specified cause (orientation decreased from baseline MCI)  =>Mild Cognitive Impairment (orientation at baseline)  =>Other Explanation of clinical findings  =>Unable to Determine (no explanation of clinical findings)    Please clarify and document your clinical opinion in the progress notes and discharge summary including the definitive and/or presumptive diagnosis, (suspected or probable), related to the above clinical findings. Please include clinical findings supporting your diagnosis. If you DECLINE this query or would like to communicate with [facility], please utilize the \"[facility] message box\" at the TOP of the Progress Note on the right.       Thank you,  Rain Garland RN BSN CCDS [phone]

## 2018-12-19 NOTE — PROGRESS NOTES
Problem: Falls - Risk of  Goal: *Absence of Falls  Document Dario Fall Risk and appropriate interventions in the flowsheet.   Outcome: Progressing Towards Goal  Fall Risk Interventions:  Mobility Interventions: Utilize walker, cane, or other assistive device, Patient to call before getting OOB    Mentation Interventions: Bed/chair exit alarm, Update white board, Increase mobility    Medication Interventions: Patient to call before getting OOB, Teach patient to arise slowly    Elimination Interventions: Call light in reach, Patient to call for help with toileting needs, Urinal in reach    History of Falls Interventions: Door open when patient unattended, Bed/chair exit alarm

## 2018-12-19 NOTE — PROGRESS NOTES
Pt confused and combative with staff due to confusion. RRT called. Will follow up tomorrow for PT. Not appropriate today due to safety and confusion.

## 2018-12-19 NOTE — CONSULTS
[address] [facility]  MR#: [id number]  : 1935  ACCOUNT #: [de-identified]   DATE OF SERVICE: 2018    REQUESTING PROVIDER:  Marysol Contreras     PRIMARY CARE PROVIDER:   Jhonatan Oconnor DO    REASON FOR MEDICAL CONSULTATION:  Metabolic encephalopathy and confusion. OTHER DIAGNOSES:  Include  Parkinson's, recent left hip fracture repair, history of melanoma, cholecystectomy in the past.    HISTORY OF PRESENT ILLNESS:  The patient is 82years old. He has Parkinson's disease. He is on Sinemet 4 times a day. He was admitted on the  for a hip fracture. He had fallen over a week ago, had x-rays that were negative, continued to have pain and difficulty ambulating and had followup scans that showed a fracture. He was admitted for that by Orthopedics. He ultimately had the fracture repaired on Monday the . Today, an RRT was called because of acute confusion; however, his daughter who was present and noted she was very concerned about slurred speech and confusion. The nurse noted the confusion had been ongoing over the course of last night. He had become combative. A call was made to the orthopedic PA. Ativan was ordered, which he received. He has been confused since this morning. He has eaten, however. He has ambulated with assistance, but has remained very delirious, confused as to person, place and time. His daughter notes that this is not usual for him and ultimately, the rapid response was called for evaluation. My initial evaluation showed no evidence for neurologic deficit. He followed commands appropriately. I did not note significant slur in his speech and no facial weakness. Tongue was midline. His upper and lower extremity strength was actually quite good and equal bilaterally.   I asked the nurse to take him down for a stat CT of his head, but ultimately, we find that this most likely is medication-induced delirium at this point and would significantly recommend against giving further benzodiazepines to this elderly gentleman with Parkinson's disease. PAST MEDICAL HISTORY:  Includes prostate cancer, GERD, Parkinson's, depression. PAST SURGICAL HISTORY:  Cataract removal, hernia repair, cholecystectomy and vasectomy. SOCIAL HISTORY:  Nonsmoker, no alcohol use. HOME MEDICATIONS:  Skelaxin, Azilect, Protonix, aspirin, Sinemet, Myrbetriq, ranitidine, Zoloft. ALLERGIES:  HE HAS NO MEDICATION ALLERGIES. REVIEW OF SYSTEMS:  Not able to be obtained comprehensively because the patient is too confused to provide this. PHYSICAL EXAMINATION:  GENERAL:  He is an elderly gentleman, sitting in a wheelchair currently. He is a little agitated and grabbing for things, stating he is in [place] and mumbling various different dates and such, but he does follow commands appropriately. He is pleasant. VITAL SIGNS:  His temperature is 97.7, blood pressure 163/73, respiratory rate 15, SpO2 is 98%. HEENT:  His sclerae are anicteric. Conjunctivae pink. His oropharynx is dry. No lesion. LUNGS:  Clear bilaterally. No rales, rhonchi, wheezes. HEART:  Regular rate and rhythm. No murmur, rub or gallop. ABDOMEN:  Soft, flat, nontender, no distention. EXTREMITIES:  Left hip is dressed. Lower extremity strength is equal bilaterally, 5/5; upper extremity strength equal bilaterally, 5/5. NEUROLOGIC:  Cranial nerves II-XII are grossly intact. He follows commands appropriately. There is no facial deficit or weakness. Tongue is midline and no muscle fasciculations noted. No seizure activity. LABORATORY DATA:  Labs from this morning are reviewed. H and H are 13 and 40.4. Metabolic panel was within normal limits. ASSESSMENT:   1.  Metabolic encephalopathy, suspect benzodiazepine-related confusion superimposed on environmental change with underlying Parkinson's disease and recent stress of surgery. 2.  Parkinson's disease.   3. Depression. PLAN:  I would recommend a CT of his head just to ensure that there have been no acute changes since his last one when he had it surrounding the fall on the 12/08, but otherwise, I would keep an eye on him today, make sure that he is safe, does not receive any further sedating medications. If he needs something for agitation and he becomes significantly combative, then we could try a small dose of Haldol, but in the interim, I would continue to give him his Sinemet 4 times daily, his daily aspirin, his Stalevo, his Colace, Myrbetriq, Protonix, Zantac, Azilect and Zoloft that he is getting already, and Percocet 1 tablet every 4-6 hours as needed for pain only. Continue his regular diet and plan for him going out to a skilled nursing facility tomorrow, which is the current plan. I hope that his mental status will improve. It may not completely go back to normal while he is still in the hospital here. There is a lot of stimulation for him at this point and already with sleep deprivation and the effect of potentially other medications that might be challenging to see him return to normal completely before his discharge over to skilled nursing where his routine there may be more regular and predictable for his recovery. We will follow along with you. Thank you very much for the consult.       MD KENDALL Laguna / ZAHAR  D: 12/19/2018 14:34     T: 12/19/2018 18:04  JOB #: [id number]

## 2018-12-19 NOTE — PROGRESS NOTES
Problem: Falls - Risk of  Goal: *Absence of Falls  Document Dario Fall Risk and appropriate interventions in the flowsheet.   Outcome: Progressing Towards Goal  Fall Risk Interventions:  Mobility Interventions: Utilize walker, cane, or other assistive device, PT Consult for assist device competence, PT Consult for mobility concerns, Patient to call before getting OOB, Communicate number of staff needed for ambulation/transfer    Mentation Interventions: Bed/chair exit alarm, Door open when patient unattended, More frequent rounding, Reorient patient, Room close to nurse's station, Evaluate medications/consider consulting pharmacy    Medication Interventions: Patient to call before getting OOB, Teach patient to arise slowly, Bed/chair exit alarm    Elimination Interventions: Bed/chair exit alarm, Call light in reach, Urinal in reach, Toileting schedule/hourly rounds, Patient to call for help with toileting needs    History of Falls Interventions: Room close to nurse's station, Door open when patient unattended

## 2018-12-19 NOTE — PROGRESS NOTES
[address] [place] care of pt at this time. Assessment complete. Pt alert and oriented to person and time disoriented to place and situation. Bed alarm on. Denies SOB and chest pain. Pt lungs clear bilaterally. Cap refill  less than 3 seconds. Pt denies numbness and tingling to all extremities. Stated pain 6/10. Pt has NO IV access  Pt has mepilex dressing to left hip with scant amount of drainage . SCD's in place  Pt encouraged to continue use of IS. Pt verbalized understanding. Ice pack applied. Call light and possessions within reach. Bed in low position. Will continue to monitor. 1400  Pt is hallucinating at this time after being given one time dose of ativan for combative behaviors toward staff. Paging Marita to inform of pt status. 1410  RRT called daughter concerned pt altered mental is completely off and his speech is slurred. [address], resp, charge nurse, CNA and supervisor in room with daughter at bedside. Pt exp hallucinations. CT of head scan ordered stat. 1416  RRT ended Nurse will cont to monitor. Daughter is at bedside. Bed alarm on.    1440  Pt sent down to CT at this time    Jonh Costa updated on pt status being confused, combative and exp hallucinations. CT was ordered by hospitalist     [phone]  Pt returned to room at this time    [address]  Paged Dr Beni Quevedo at this time per pt family request. Daughter does not want for any reason pt to have percocet or any other narcotics. Daughter would like tylenol for pain as needed awaiting return call. 1806  Dr Beni Quevedo returned call and will order tylenol for this pt. Pt status a little less combative but still remians confused. Shift summary   Pt alert to self and place. Disoriented to situation and time. Pt did not get much rest lastnight. Pleasantly confused in am at beginning of shift. Became agitated and and combative this afternoon. Ativan was given one time. Pt exp hallucinations. Daughter stayed at bedside.  Nurse and CNA continued to check on pt. Later in shift pt became less combative but still remained confused. No sitter was recommended due to pt discharging to SNF tomorrow. Daughter would like only tylenol to be given for pain.

## 2018-12-19 NOTE — ROUTINE PROCESS
Bedside shift change report given to Kenneth MONTGOMERY Rn (oncoming nurse) by Noelle MARROQUIN RN (offgoing nurse). Report included the following information SBAR, Kardex and MAR.

## 2018-12-19 NOTE — PROGRESS NOTES
Plan: pt has been accepted for admission Thursday 12/20 to [facility] at [address] [place], [place]. Report to [phone]. Please include all hard scripts for controlled substances, med rec and dc summary in packet. Please medicate for pain prior to dc if possible and needed to help offset delay when patient first arrives to facility. Pt will need medical transport for safety d/t WB status and assistance with mobility. Care Management Interventions  PCP Verified by CM:  Yes  Physical Therapy Consult: Yes  Occupational Therapy Consult: Yes  Current Support Network: Assisted Living  Confirm Follow Up Transport: Family  Plan discussed with Pt/Family/Caregiver: Yes  Discharge Location  Discharge Placement: Skilled nursing facility

## 2018-12-19 NOTE — PROGRESS NOTES
RESPONDED TO RRT FOR PATIENT WITH CONFUSION/SLURRED SPEECH/VISUAL HALLUCINATIONS.  SPO2 95-98% ON RA.  DR. Lukasz Robb AND JEFF VALENTIN AT BEDSIDE. NO RESPIRATORY INTERVENTION NEEDED AT THIS TIME.

## 2018-12-19 NOTE — PROGRESS NOTES
1928 - Assumed care of pt at this time. Pt in bed with no signs of distress. Pt left with call light within reach and encouraged to call for assistance. 2037 -  Head to toe assessment performed at this time. Pt denies any chest pain or SOB. Pt denies any numbness or tingling to extremities. Pt encouraged to manage pain and to use the incentive spirometer. Pt educated on the side effects of medications taken. Pt left with call light within reach and bed in low position. Will continue to monitor. 2237 - Pt stated pain as 7/10. Pt declined pain medication at this time. Pt encouraged to manage pain and to call for assistance. Will continue to monitor. 0344 - Pt state pain as 7/10. Pt received medication per MAR and ice. Pt informed of the side effects of the medication and the next time medication can be given. Pt encouraged to call for assistance and to manage pain. Pt encouraged to use incentive spirometer. Pt left with call light within reach, bed in low position and wheels locked. Will continue to monitor. Shift summary - Pt did not complain of much pain through the night. Pt did have periods of confusion and was not able to verbalize where he is and why he is here. Pt encouraged to call for assistance and to renzo ge pain. Pt left with bed alarm on. Pt left in bed with no signs of distress.

## 2018-12-19 NOTE — PROGRESS NOTES
Call to patient's room by CNA. CNA stated that patient was refusing to return to bed. Upon entering the room patient was standing in the middle of the room with staff supporting each arm. Patient was refusing to return to bed. Offered patient to sit in chair, sit on side of bed, or even ambulate with staff using his walker. Patient refused all attempts. Several staff stood with patient for approx. 15 mins. Staff eventually able to get patient to sit being that he was starting to sway and loose his balance at this time. While awaiting patient's daughter arrival to room to assist with patient's behavior, patient became increasingly agitated and begin fussing with staff, patient begin shaking the walker and standing from chair. Patient unable to be distracted or deescalated. Contacted JEFF Ma and notified of current situation. Orders given for Ativan 1mg IM due to patient not having IV access because patient had previously pulled out his IV. Orders placed and Ativan administered. Shortly after administration patient's daughter arrived to unit. She was able to calm patient and took him for a walk around the unit in a wheelchair.

## 2018-12-19 NOTE — PROGRESS NOTES
Problem: Pressure Injury - Risk of  Goal: *Prevention of pressure injury  Document Yan Scale and appropriate interventions in the flowsheet.   Outcome: Progressing Towards Goal  Pressure Injury Interventions:  Sensory Interventions: Pressure redistribution bed/mattress (bed type)    Moisture Interventions: Absorbent underpads    Activity Interventions: Increase time out of bed, Pressure redistribution bed/mattress(bed type)    Mobility Interventions: Pressure redistribution bed/mattress (bed type)    Nutrition Interventions: Document food/fluid/supplement intake

## 2018-12-19 NOTE — ROUTINE PROCESS
Bedside and Verbal shift change report given to ANNABEL Angelo RN (oncoming nurse) by YUNI Hernandez RN (offgoing nurse). Report included the following information SBAR, Kardex, Intake/Output, MAR and Recent Results.

## 2018-12-19 NOTE — PROGRESS NOTES
Progress Note     Patient: Leora Gaxiola MRN: [id number]  SSN: [id number]    YOB: 1935  Age: 82 y.o. Sex: male      POD:    2 Days Post-Op  S/P:    Procedure(s):  LEFT HIP: INTRAMEDULLARY HIP PINNING WITH C-ARM     Subjective:   Pt is without complaints of pain. Patient states that he has not been up and walking around much yet. Objective:     Patient Vitals for the past 12 hrs:   BP Temp Pulse Resp SpO2   12/19/18 0713 163/73 97.7 °F (36.5 °C) 65 15 98 %   12/19/18 0334 159/89 98.5 °F (36.9 °C) 60 16 94 %   12/18/18 2237 144/73 98.7 °F (37.1 °C) 69 16 99 %     Recent Labs     12/19/18  0306  12/17/18  1100   HGB 13.0   < >  --    HCT 40.4   < >  --    INR  --   --  1.0      < >  --    K 3.8   < >  --       < >  --    CO2 31   < >  --    BUN 13   < >  --    CREA 0.83   < >  --    GLU 88   < >  --     < > = values in this interval not displayed. Pt. resting in bed. Lower extremity operative dressing intact with some blood shadowing on bandage. Ecchymosis surrounding incision sites. Neurovascularly intact. Calves soft, nontender. Assessment:     Awake and alert. In good spirits. Plan:   1. Continue pain management/ ice to operative area. 2. Continue to progress with PT/OT. 3. Discharge planning to SNF/inpatient rehab. 4. Spoke with OT and consulted with Dr. Doug Amador yesterday. Patient was unable to adhere to 15% weight-bearing yesterday. As per Dr. Doug Amador, patient may progress to protected weight bearing as tolerated with a walker and a limit of 100 feet.

## 2018-12-19 NOTE — CDMP QUERY
The medical record reflects the following:  Risk:  82 s/p L hip pinning with planned NH placement  Clinical Indicators:  --[facility] per [place] MD \" Mood: Some anxiety and depression. Had about 37 lb weight loss before he moved down here. Moved to [place] from [place] to be closer to his family. Used to live in [place]. Currently takes sertraline. Anxiety has been better\"  --PMH \"depression\"  Treatment: Zoloft continued in hospital    Please clarify if this patient is being treated/managed for:  =>Recurrent Depression, Current  =>Depression, none current  =>Other Explanation of clinical findings  =>Unable to Determine (no explanation of clinical findings)    Please clarify and document your clinical opinion in the progress notes and discharge summary including the definitive and/or presumptive diagnosis, (suspected or probable), related to the above clinical findings. Please include clinical findings supporting your diagnosis. If you DECLINE this query or would like to communicate with [facility], please utilize the \"[facility] message box\" at the TOP of the Progress Note on the right.       Thank you,  Liz Liu RN BSN CCDS [phone]

## 2018-12-20 VITALS
OXYGEN SATURATION: 96 % | DIASTOLIC BLOOD PRESSURE: 86 MMHG | WEIGHT: 171.38 LBS | BODY MASS INDEX: 25.97 KG/M2 | HEIGHT: 68 IN | HEART RATE: 74 BPM | RESPIRATION RATE: 16 BRPM | SYSTOLIC BLOOD PRESSURE: 122 MMHG | TEMPERATURE: 98.6 F

## 2018-12-20 LAB
ANION GAP SERPL CALC-SCNC: 2 MMOL/L (ref 3–18)
BUN SERPL-MCNC: 10 MG/DL (ref 7–18)
BUN/CREAT SERPL: 12
CALCIUM SERPL-MCNC: 8.7 MG/DL (ref 8.5–10.1)
CHLORIDE SERPL-SCNC: 106 MMOL/L (ref 100–108)
CO2 SERPL-SCNC: 34 MMOL/L (ref 21–32)
CREAT SERPL-MCNC: 0.82 MG/DL (ref 0.6–1.3)
GLUCOSE SERPL-MCNC: 95 MG/DL (ref 74–99)
HCT VFR BLD AUTO: 44.9 % (ref 36–48)
HGB BLD-MCNC: 14.6 G/DL (ref 13–16)
POTASSIUM SERPL-SCNC: 3.8 MMOL/L (ref 3.5–5.5)
SODIUM SERPL-SCNC: 142 MMOL/L (ref 136–145)

## 2018-12-20 PROCEDURE — 74011250637 HC RX REV CODE- 250/637: Performed by: HOSPITALIST

## 2018-12-20 PROCEDURE — 80048 BASIC METABOLIC PNL TOTAL CA: CPT

## 2018-12-20 PROCEDURE — 97116 GAIT TRAINING THERAPY: CPT

## 2018-12-20 PROCEDURE — 85018 HEMOGLOBIN: CPT

## 2018-12-20 PROCEDURE — 74011250637 HC RX REV CODE- 250/637: Performed by: PHYSICIAN ASSISTANT

## 2018-12-20 PROCEDURE — 74011000250 HC RX REV CODE- 250: Performed by: PHYSICIAN ASSISTANT

## 2018-12-20 PROCEDURE — 36415 COLL VENOUS BLD VENIPUNCTURE: CPT

## 2018-12-20 PROCEDURE — 97530 THERAPEUTIC ACTIVITIES: CPT

## 2018-12-20 RX ORDER — HYDRALAZINE HYDROCHLORIDE 50 MG/1
25 TABLET, FILM COATED ORAL ONCE
Status: COMPLETED | OUTPATIENT
Start: 2018-12-20 | End: 2018-12-20

## 2018-12-20 RX ADMIN — RANITIDINE 150 MG: 150 TABLET ORAL at 09:16

## 2018-12-20 RX ADMIN — SERTRALINE HYDROCHLORIDE 50 MG: 50 TABLET ORAL at 09:16

## 2018-12-20 RX ADMIN — CARBIDOPA, LEVODOPA AND ENTACAPONE 1 TABLET: 37.5; 200; 15 TABLET, FILM COATED ORAL at 11:50

## 2018-12-20 RX ADMIN — ACETAMINOPHEN 650 MG: 325 TABLET ORAL at 03:33

## 2018-12-20 RX ADMIN — MIRABEGRON 50 MG: 25 TABLET, FILM COATED, EXTENDED RELEASE ORAL at 09:15

## 2018-12-20 RX ADMIN — CARBIDOPA, LEVODOPA AND ENTACAPONE 1 TABLET: 37.5; 200; 15 TABLET, FILM COATED ORAL at 16:02

## 2018-12-20 RX ADMIN — ACETAMINOPHEN 650 MG: 325 TABLET ORAL at 16:02

## 2018-12-20 RX ADMIN — CARBIDOPA AND LEVODOPA 0.5 TABLET: 25; 100 TABLET ORAL at 13:00

## 2018-12-20 RX ADMIN — CARBIDOPA AND LEVODOPA 0.5 TABLET: 25; 100 TABLET ORAL at 07:47

## 2018-12-20 RX ADMIN — CARBIDOPA, LEVODOPA AND ENTACAPONE 1 TABLET: 37.5; 200; 15 TABLET, FILM COATED ORAL at 06:46

## 2018-12-20 RX ADMIN — DOCUSATE SODIUM 100 MG: 100 CAPSULE, LIQUID FILLED ORAL at 09:15

## 2018-12-20 RX ADMIN — CYCLOSPORINE 1 DROP: 0.5 EMULSION OPHTHALMIC at 09:00

## 2018-12-20 RX ADMIN — PANTOPRAZOLE SODIUM 40 MG: 40 TABLET, DELAYED RELEASE ORAL at 09:16

## 2018-12-20 RX ADMIN — ACETAMINOPHEN 650 MG: 325 TABLET ORAL at 07:47

## 2018-12-20 RX ADMIN — HYDRALAZINE HYDROCHLORIDE 25 MG: 50 TABLET, FILM COATED ORAL at 13:17

## 2018-12-20 RX ADMIN — ASPIRIN 325 MG: 325 TABLET, DELAYED RELEASE ORAL at 09:15

## 2018-12-20 NOTE — DISCHARGE INSTRUCTIONS
Discharge Instructions   Dr. Indigo Li     Please take the time to review the following instructions before you leave the hospital and use them as guidelines during your recovery from surgery. If you have any questions you may contact my office at [phone]. Wound Care / Dressing Changes: You may change your dressing as needed. Beginning the day after you are discharged from the hospital you should change your dressing daily. A big, bulky dressing isn't necessary as long as there isn't any drainage from the incisions. You can put a band-aid or mepilex dressing over the incision. It isn't necessary to apply antibiotic ointment to your incisions. There will be a clear film covering your incision, do not remove. As the edges begin to peel, you may trim with small scissors. This film will fall of in about 1 month. Keep a towel or gauze in any skin folds that may hang over the incision so that it stays dry. Dalbert Daft / Bathing: You may only shower. You may shower if there is no drainage from your incisions. Your dressing may be removed for showering. You may get your incisions wet in the shower. Don't vigorously scrub the area where your incisions are. Apply a clean, dry dressing after drying off the area of your incisions. Don't take a tub bath, get in a swimming pool or Jacuzzi until the incisions are completely healed. Do not soak your incisions under water. Weight Bearing Status / Braces:     ___X__ Protected weight bearing as tolerated with walker. You may move your joints as much as tolerated.     _____  Sierra Fender Touch\" weight bearing. Don't bear weight on the leg you had operated on. Use your toes only to steady yourself as you ambulate with crutches or a walker. _____ Avoid extreme hip extension with external rotation. Home Health:    Home health has been arranged for skilled nursing visits and physical therapy. Your home health has been set up through____________ .  If no one from the agency calls you on the day after you arrive home, please contact them at the number provided at discharge. Physical therapy for gait training and strengthening. Continue therapeutic exercises. Physical Therapy:       Begin In-Home Physical Therapy; 3 times a week to work on gait training, range of motion, strengthening, and weight bearing exercises as tolerable. Continue to use your walker or cane when walking; may progress from the walker to a cane to complete total bearing as tolerable. Ice / Elevation:     Continue ice and elevation as needed for pain and swelling. Diet:     Resume your pre hospital diet. If you have excessive nausea or vomiting call your doctor's office. Medication:     1. You will be given a prescription for pain medication when you are discharged for the hospital. Take the medication as needed according to the directions on the prescription bottle. Possible side effects of the medication include dizziness, headache, nausea, vomiting, constipation and urinary retention. If you experience any of these side effects call the office so that we can assist you in relieving them. Discontinue the use of the pain medication if you develop itching, rash, shortness of breath or difficulties swallowing. If these symptoms become severe or aren't relieved by discontinuing the medication you should seek immediate medical attention. Refills of pain medication are authorized during office hours only. (am - pm Mon. thru Fri.)     1. You should take one Aspirin 325 MG twice daily for one month from the date of your surgery. This will help to prevent blood clots from forming in your legs. 2. You may resume the medication you were taking prior to your surgery. Pain medication may change the effects of any antidepressant medication you are taking.  If you have any questions about possible interactions between your regular medications and the pain medication you should consult the physician who prescribes your regular medications. 3. Please take over the counter stool softeners while you are taking narcotic pain medication. Pain medications can cause constipation. Stool softeners, warm prune juice and increasing your water and fiber intake can help prevent constipation. Do not take laxatives. Follow Up Appointment[de-identified]    Please call [phone] for a follow appointment with Dr. Velasquez Reinoso in 10-14 days from the time of your surgery. Please let our office know you are scheduling a post-op appointment. Signs and Symptoms to be Aware of: If any of the following signs and symptoms occur, you should contact Dr. Toby Severe office. Please be advised if problem arises which you feel requires immediate medical attention or you are unable to contact Dr. Toby Severe office, you should seek immediate medical attention at the emergency department or other health care facility you have access to. Signs and symptoms to watch for include:     1. A sudden increase in swelling and or redness or warmth at the area your surgery was performed which isn't relieved by rest, ice and elevation. 2 Oral temperature greater than 101.5 degrees for 12 hours or more which isn't relieved by an increase in fluid intake and taking two Tylenol every 4-6 hours. 3 Excessive drainage from your incisions, or drainage which hasn't stopped by 72 hours after your surgery despite applying a compressive dressing, ice and elevation. 4 Calf pain, tenderness, redness or swelling which isn't relieved with rest and elevation. 5 Fever, chills, shortness of breath, chest pain, nausea, vomiting or other signs and symptoms which are of concern to you.      Other Instructions:

## 2018-12-20 NOTE — PROGRESS NOTES
Problem: Mobility Impaired (Adult and Pediatric)  Goal: *Acute Goals and Plan of Care (Insert Text)  Physical Therapy Goals   Initiated 12/18/2018 and to be accomplished within 3-5 day(s)  1. Patient will move from supine <> sit with S in prep for out of bed activity and change of position. 2.  Patient will perform sit<> stand with S while maintain 15% WBing precaution with LRAD in prep for transfers/ambulation. 3.  Patient will transfer from bed <> chair with S while maintain 15% WBing precaution with LRAD for time up in chair for completion of ADL activity. 4.  Patient will ambulate >10 feet with LRAD/S while maintaining 15% WBing precaution for improved functional mobility/safe discharge. Outcome: Progressing Towards Goal  physical Therapy TREATMENT    Patient: Ermalene Romberg (16 y.o. male)  Date: 12/20/2018  Diagnosis: FRACTURE OF UNSPECIFIED PART OF NECK OF LEFT FEMUR, INITIAL ENCOUNTER FOR CLOSED FRACTURE  Hip fracture, left (HCC) <principal problem not specified>  Procedure(s) (LRB):  LEFT HIP: INTRAMEDULLARY HIP PINNING WITH C-ARM (Left) 3 Days Post-Op  Precautions: Fall, TTWB(15% wt/bearing)   Chart, physical therapy assessment, plan of care and goals were reviewed. PLOF: lives in an independent living facility, ambulatory without AD, PD  ASSESSMENT:  Pt confused but calm today. Reports needing to have a BM, removed wet brief, pt rolled to the R for bed pan placement, only gas, no BM, donned clean brief. ModA to sit up EOB. Elevated bed to assist with sit to stand. Ambulated 20ft with RW, maxA with RW mgmt and gt sequence. No LOB but extreme difficulty understanding how to use RW mgmt. Pt returned to bed, lunch arrived, placed bed into chair position, 4 rails up and bed alarm on.      Education: RW mgmt and safety   Progression toward goals:  []      Improving appropriately and progressing toward goals  [x]      Improving slowly and progressing toward goals  []      Not making progress toward goals and plan of care will be adjusted     PLAN:  Patient continues to benefit from skilled intervention to address the above impairments. Continue treatment per established plan of care. Discharge Recommendations:  Skilled Nursing Facility  Further Equipment Recommendations for Discharge:  TBD by next level of care     SUBJECTIVE:   Patient stated I might crap my pants.     OBJECTIVE DATA SUMMARY:   Critical Behavior:  Neurologic State: Confused  Orientation Level: Disoriented to situation, Disoriented to place, Disoriented to time  Cognition: Impaired decision making  Safety/Judgement: Decreased awareness of need for assistance, Decreased awareness of need for safety, Lack of insight into deficits  Functional Mobility Training:  Bed Mobility:  Rolling: Minimum assistance  Supine to Sit: Moderate assistance  Sit to Supine: Minimum assistance  Transfers:  Sit to Stand: Minimum assistance  Stand to Sit: Contact guard assistance  Balance:  Sitting: Intact  Standing: With support  Standing - Static: Fair  Standing - Dynamic : Poor  Ambulation/Gait Training:  Distance (ft): 20 Feet (ft)  Assistive Device: Gait belt;Walker, rolling  Ambulation - Level of Assistance: Maximum assistance(with RW mgmt and safety)  Left Side Weight Bearing: As tolerated(but limt to <100ft)  Base of Support: Narrowed  Speed/Estella: Delayed  GCODE:Mobility [id number] Current  CL= 60-79%   Goal  CI= 1-19%. The severity rating is based on the Level of Assistance required for Functional Mobility and ADLs. Pain:  Pre:0  Post:0  Pain Scale 1: Numeric (0 - 10)    Activity Tolerance:   improving  Please refer to the flowsheet for vital signs taken during this treatment.   After treatment:   [] Patient left in no apparent distress sitting up in chair  [x] Patient left in no apparent distress in bed  [x] Call bell left within reach  [x] Nursing notified  [] Caregiver present  [x] Bed alarm activated      LACIE Vaughn Calculation: 30 mins

## 2018-12-20 NOTE — PROGRESS NOTES
Progress Note     Patient: Ermalene Romberg MRN: [id number]  SSN: [id number]    YOB: 1935  Age: 82 y.o. Sex: male      POD:    3 Days Post-Op  S/P:    Procedure(s):  LEFT HIP: INTRAMEDULLARY HIP PINNING WITH C-ARM     Subjective:   Pt is without complaints of pain. Objective:     Patient Vitals for the past 12 hrs:   BP Temp Pulse Resp SpO2   12/20/18 0259 186/76 98.1 °F (36.7 °C) 71 17 94 %   12/19/18 2021 176/81 97.8 °F (36.6 °C) 77 18 97 %     Recent Labs     12/20/18  0553  12/17/18  1100   HGB 14.6   < >  --    HCT 44.9   < >  --    INR  --   --  1.0      < >  --    K 3.8   < >  --       < >  --    CO2 34*   < >  --    BUN 10   < >  --    CREA 0.82   < >  --    GLU 95   < >  --     < > = values in this interval not displayed. Pt resting in bed. Lower extremity incision clean/dry/intact. Neurovascularly intact. Assessment:     Awake and alert. In good spirits. Events of yesterday discussed with So. Seems better this am.        Plan:   1. Continue pain management/ ice to operative area. 2. Continue to progress with PT/OT. 3. Discharge planning to skilled nursing facility vs. inpatient rehab.

## 2018-12-20 NOTE — PROGRESS NOTES
Problem: Falls - Risk of  Goal: *Absence of Falls  Document Dario Fall Risk and appropriate interventions in the flowsheet.   Fall Risk Interventions:  Mobility Interventions: Utilize walker, cane, or other assistive device    Mentation Interventions: Bed/chair exit alarm    Medication Interventions: Patient to call before getting OOB, Teach patient to arise slowly, Bed/chair exit alarm    Elimination Interventions: Bed/chair exit alarm    History of Falls Interventions: Room close to nurse's station, Door open when patient unattended

## 2018-12-20 NOTE — PROGRESS NOTES
2025 - Patient in bed at this time. A/O x to self, disoriented to place, time, or situation. No IV access in place. SCDs to bilateral legs, takes off at times. Mepiles dressing to left hip, with scant amount of old dry drainage. No numbness/tingling. Pedal pulses palpable. Lungs CTA. Bowel sounds active to all quadrants. Pain 5/10 per visual by nurse. Patient up out of bed to chair while bed was changed and patient cleaned up. Patient unsteady when OOB and on feet, doesn't want to use walker for assist.    0200-Patient fidgeting d/t needs to urinate. Urinal used. Patient is repositioned in bed and covered with blankets. Patient attempt to put nurses hand back on his penis, nurse told patient he was getting too fresh and he said \"I know\", but did not attempt anything else at that time. 0305-No changes to previous assessment. Patient remains confused. Continues to use urinal. Fidgeting in bed whenever he needs to urinate. Up on knees at times moving in bed.    0333-Tylenol given for pain. Unable to assess pain level per patient. Visual pain rated 4/10.    0422-Voided 200 ml into urinal. CHG wipes completed and bed pan changed. Shift summary-Remains confused, oriented to self. Using urinal and voiding sufficiently. Patient can ambulate, but is unsteady. Daughter at bedside. Bed alarm used through the night.

## 2018-12-20 NOTE — DISCHARGE SUMMARY
Discharge Summary    Patient ID:  Eloisa Miguel  1935  82 y.o.  [id number]    Admit date: 12/17/2018    Discharge date and time: No discharge date for patient encounter. Admitting Physician: Radha Betancur MD     Admission Diagnoses: FRACTURE OF UNSPECIFIED PART OF NECK OF LEFT FEMUR, INITIAL ENCOUNTER FOR CLOSED FRACTURE  Hip fracture, left [facility])    Discharge Diagnoses: Active Problems:    Hip fracture, left ([facility] (59/53/0670)      Metabolic encephalopathy (61/67/2668)      Parkinson disease ([facility] (12/19/2018)        HISTORY OF PRESENT ILLNESS:  Eloisa Miguel is a pleasant 82 y.o. who presented with left femoral neck fracture. Risks and benefits were discussed with the patient and family and treatment was elected. The patient's complete history and physical, laboratory data and physical exam is documented in hospital chart. HOSPITAL COURSE:  Eloisa Miguel was admitted on12/17/2018 and underwent left hippinning. There were no complications intraoperatively and the patient was transferred to the orthopaedic floor in stable condition. Physical therapy and occupational therapy initiated their evaluation and treatment and continued to follow the patient until the patient was discharged. Pain was controlled with oral and iv medications. The patient was transitioned to oral narcotics. The incision site remained clean, dry, and intact. The patient remained neurovascularly intact. At the time of discharge, the patient had an understanding of the explicit discharge precautions and instructions following surgery. Post Op complications: none    Current Discharge Medication List      START taking these medications    Details   oxyCODONE-acetaminophen (PERCOCET) 5-325 mg per tablet Take 1-2 Tabs by mouth every four (4) hours as needed. Max Daily Amount: 12 Tabs.   Qty: 45 Tab, Refills: 0    Associated Diagnoses: Closed fracture of left hip, initial encounter ([facility]         CONTINUE these medications which have CHANGED    Details   aspirin delayed-release 325 mg tablet Take 1 Tab by mouth two (2) times a day. Qty: 60 Tab, Refills: 0         CONTINUE these medications which have NOT CHANGED    Details   pantoprazole (PROTONIX) 40 mg tablet Take 40 mg by mouth daily. rasagiline (AZILECT) 1 mg tablet Take 1 mg by mouth nightly. carbidopa-levodopa (SINEMET)  mg per tablet Take 0.5 Tabs by mouth four (4) times daily. carbidopa-levodopa-entacapone (STALEVO) 37.5-150-200 mg tablet Take 1 Tab by mouth Before breakfast, lunch, dinner and at bedtime. cycloSPORINE (RESTASIS) 0.05 % dpet Administer 1 Drop to both eyes every twelve (12) hours. mirabegron ER (MYRBETRIQ) 50 mg ER tablet Take 50 mg by mouth daily. raNITIdine hcl 150 mg capsule Take 150 mg by mouth two (2) times a day. sertraline (ZOLOFT) 50 mg tablet Take 50 mg by mouth daily. metaxalone (SKELAXIN) 400 mg tablet Take 1 Tab by mouth three (3) times daily as needed. Qty: 12 Tab, Refills: 0             Discharged to: [facility]    Discharge instructions:  -Resume pre hospital diet.            -Resume home medications per medication reconciliation form. Prescriptions for pain medication were provided to the patient.     -Ambulate with an assisted device as instructed by PT/OT prior to discharge.   -First follow-up appointment to be scheduled in 10 days. If patient is unaware of their appointment time/date, they are call Dr. Jeni Simmonds office at [phone]. -Explicit discharge instructions were provided to the patient. Procedure(s):  LEFT HIP: INTRAMEDULLARY HIP PINNING WITH C-ARM      * Disposition: [place] ([facility])    Discharge Medications:   Current Discharge Medication List      START taking these medications    Details   oxyCODONE-acetaminophen (PERCOCET) 5-325 mg per tablet Take 1-2 Tabs by mouth every four (4) hours as needed. Max Daily Amount: 12 Tabs.   Qty: 45 Tab, Refills: 0    Associated Diagnoses: Closed fracture of left hip, initial encounter ([facility]         CONTINUE these medications which have CHANGED    Details   aspirin delayed-release 325 mg tablet Take 1 Tab by mouth two (2) times a day. Qty: 60 Tab, Refills: 0         CONTINUE these medications which have NOT CHANGED    Details   pantoprazole (PROTONIX) 40 mg tablet Take 40 mg by mouth daily. rasagiline (AZILECT) 1 mg tablet Take 1 mg by mouth nightly. carbidopa-levodopa (SINEMET)  mg per tablet Take 0.5 Tabs by mouth four (4) times daily. carbidopa-levodopa-entacapone (STALEVO) 37.5-150-200 mg tablet Take 1 Tab by mouth Before breakfast, lunch, dinner and at bedtime. cycloSPORINE (RESTASIS) 0.05 % dpet Administer 1 Drop to both eyes every twelve (12) hours. mirabegron ER (MYRBETRIQ) 50 mg ER tablet Take 50 mg by mouth daily. raNITIdine hcl 150 mg capsule Take 150 mg by mouth two (2) times a day. sertraline (ZOLOFT) 50 mg tablet Take 50 mg by mouth daily. metaxalone (SKELAXIN) 400 mg tablet Take 1 Tab by mouth three (3) times daily as needed. Qty: 12 Tab, Refills: 0             * Follow-up Care/Patient Instructions: Activity: See surgical instructions  Diet: Resume previous diet  Wound Care: As directed    Follow-up Information     Follow up With Specialties Details Why Contact Info    Rut Wilks MD Orthopedic Surgery On 1/3/2019 Follow up appointment @ 2:45pm [address] [facility] [address]  The [facility] is responsible for making arrangements for the PCP visit while inhouse.  [address]      Pili Grajeda DO Internal Medicine   [address], [place]

## 2018-12-20 NOTE — PROGRESS NOTES
Hospitalist Progress Note    Patient: Neel Sarabia MRN: [id number]  CSN: [id number]    YOB: 1935  Age: 82 y.o.   Sex: male    DOA: 12/17/2018 LOS:  LOS: 3 days          Chief Complaint:    Toxic metabolic encephalopathy      Assessment/Plan     Toxic metabolic encephalopathy-much improved, CT brain neg yesterday, likely caused by ativan  parkinsons disease-stable, continue sinemet and stalevo  S/p left hip fracture repair-rehab planned      Labs reviewed and acceptable  Avoiding benzos, sedatives, narcotics, and try to keep on day/night schedule as much as possible    Will sign off  Disposition :  Patient Active Problem List   Diagnosis Code    Hip fracture, left ([facility] V55.860U    Metabolic encephalopathy K53.36    Parkinson disease ([facility] G20       Subjective:  Denies pain, nausea, HA    No events overnight    States daughters name when asked  Knows he is in hospital  Knows his past profession, and history      Review of systems:    Constitutional: denies fevers, chills, myalgias  Respiratory: denies SOB, cough  Cardiovascular: denies chest pain, palpitations  Gastrointestinal: denies  vomiting, diarrhea      Vital signs/Intake and Output:  Visit Vitals  /76 (BP 1 Location: Left arm, BP Patient Position: At rest)   Pulse 71   Temp 98.1 °F (36.7 °C)   Resp 17   Ht 5' 8\" (1.727 m)   Wt 77.7 kg (171 lb 6 oz)   SpO2 94%   BMI 26.06 kg/m²     Current Shift:  12/19 1901 - 12/20 0700  In: -   Out: 750 [Urine:750]  Last three shifts:  12/18 0701 - 12/19 1900  In: 1520 [P.O.:1520]  Out: 2350 [Urine:2350]    Exam:    General: thin elderly WM, alert, NAD, OX2, eating BF, feeding self  CVS:Regular rate and rhythm, no M/R/G, S1/S2 heard, no thrill  Lungs:Clear to auscultation bilaterally, no wheezes, rhonchi, or rales  Abdomen: Soft, Nontender, No distention, Normal Bowel sounds, No hepatomegaly  Extremities: No C/C/E, pulses palpable 2+, left hip bandaged, no bruising  Skin:normal texture and turgor, no rashes, no lesions  Neuro:grossly normal , follows commands  Psych:appropriate                Labs: Results:       Chemistry Recent Labs     12/20/18  0553 12/19/18  0306 12/18/18  0350   GLU 95 88 116*    142 138   K 3.8 3.8 4.3    106 105   CO2 34* 31 26   BUN 10 13 12   CREA 0.82 0.83 0.85   CA 8.7 8.0* 8.1*   AGAP 2* 5 7   BUCR 12 16 14      CBC w/Diff Recent Labs     12/20/18  0553 12/19/18  0306 12/18/18  0350   HGB 14.6 13.0 13.0   HCT 44.9 40.4 40.0      Cardiac Enzymes No results for input(s): CPK, CKND1, RUBIA in the last 72 hours. No lab exists for component: CKRMB, TROIP   Coagulation Recent Labs     12/17/18  1100   PTP 13.3   INR 1.0   APTT 29.0       Lipid Panel No results found for: CHOL, CHOLPOCT, CHOLX, CHLST, CHOLV, 364613, HDL, LDL, LDLC, DLDLP, 984480, VLDLC, VLDL, TGLX, TRIGL, TRIGP, TGLPOCT, CHHD, CHHDX   BNP No results for input(s): BNPP in the last 72 hours. Liver Enzymes No results for input(s): TP, ALB, TBIL, AP, SGOT, GPT in the last 72 hours.     No lab exists for component: DBIL   Thyroid Studies No results found for: T4, T3U, TSH, TSHEXT     Procedures/imaging: see electronic medical records for all procedures/Xrays and details which were not copied into this note but were reviewed prior to creation of Ayah Valentine MD

## 2018-12-20 NOTE — ROUTINE PROCESS
Bedside and Verbal shift change report given to Cesar RN (oncoming nurse) by Desean Cifuentes RN (offgoing nurse). Report included the following information SBAR, Kardex, Intake/Output and MAR.

## 2018-12-20 NOTE — PROGRESS NOTES
7:51 AM   Pt is awake, alert, oriented to person and place, not to date and not to all situation. Follows commands. Still occasionally has visual hallucinations. Seen by Dr Rome Wise this morning. Removed Mepilex which has old blood stain. Incision with Prenio intact. Has old blood stain on prenio and some bruising to  Left hip incision  Site. New Mepilex dressing applied. Pt complained of pain to left hip with pain level 7/10. Tynenol 650 mg po given. Plan for pt is discharge to SNF today. Pt was served breakfast .  9:26 AM  Pt fed self and ate 100 % of his food. Pt knows his birthdate. Pt was incontinent of urine. Pericare done, Gown and bed pad changed. Incontinent pad applied. No skin breakdown noted. 10:11 AM   As per Dr Kimberlyn Corbett, [facility] to d/c pt to [facility] today. 11:51 AM Pt ambulated with PT in the room to doorway then back in bed. Pt with difficulty ambulating with walker as per PT. Pt was incontinent of urine and uses urinal. Pt was kept clean and dry. Pt eating lunch at this time. Bed was placed on sitting position. 11:57 AM   Dr Kimberlyn Corbett was made aware of pt's Bp 173/88.  1315  Dr Kimberlyn Corbett ordered and given Hydralazine 25 mg po for high BP.  3:48 PM   Pt was incontinent of urine. Kept clean and dry. Pt has sponge bath and got dressed up. Report given to Nurse DARRELL lombardo at [facility]. Medicated with Tylenol 650 mg po for pain level 6-7/10.    4:16 PM   Pt picked up by  Medical transport per stretcher for transfer to [facility].

## 2018-12-20 NOTE — PROGRESS NOTES
Spoke with pt daughter Magan Huggins regarding discharge plan- she would like medical transport for pt to SNF, scheduled for 4pm so that she can be available to meet pt at facility. Spoke with pt in room, pt oriented to self and place, made aware he is going to rehab, he verbalizes agreement with plan. Daughter made aware that pt could incur cost for transport.

## 2018-12-20 NOTE — ROUTINE PROCESS
1945  Bedside and Verbal shift change report given to Arnol Buitrago RN (oncoming nurse) by Randell Clayton RN (offgoing nurse). Report included the following information SBAR, Kardex, MAR and Recent Results.

## 2018-12-20 NOTE — PROGRESS NOTES
Problem: Pressure Injury - Risk of  Goal: *Prevention of pressure injury  Document Yan Scale and appropriate interventions in the flowsheet.   Outcome: Progressing Towards Goal  Pressure Injury Interventions:  Sensory Interventions: Assess changes in LOC    Moisture Interventions: Absorbent underpads    Activity Interventions: Increase time out of bed    Mobility Interventions: HOB 30 degrees or less    Nutrition Interventions: Document food/fluid/supplement intake

## 2018-12-20 NOTE — ROUTINE PROCESS
Bedside and Verbal shift change report given to MONTANA Elizondo RN (oncoming nurse) by ANNABEL Venegas RN (offgoing nurse). Report included the following information SBAR, Kardex, Intake/Output and MAR.

## 2018-12-31 LAB
ATRIAL RATE: 72 BPM
CALCULATED P AXIS, ECG09: 58 DEGREES
CALCULATED R AXIS, ECG10: 63 DEGREES
CALCULATED T AXIS, ECG11: 83 DEGREES
DIAGNOSIS, 93000: NORMAL
P-R INTERVAL, ECG05: 150 MS
Q-T INTERVAL, ECG07: 384 MS
QRS DURATION, ECG06: 88 MS
QTC CALCULATION (BEZET), ECG08: 420 MS
VENTRICULAR RATE, ECG03: 72 BPM

## 2019-01-03 NOTE — CONSULTS
[facility] 
MR#: [id number] : 1935 ACCOUNT #: [de-identified] DATE OF SERVICE: 2018 PREOPERATIVE DIAGNOSIS:  Left femoral neck fracture. POSTOPERATIVE DIAGNOSIS:  Left femoral neck fracture. PROCEDURE PERFORMED:  Multiple cannulated screws, left hip. SURGEON:  Bradly Sarkar MD 
 
ASSISTANT:  JEFF Marquez  
 
ANESTHESIA:  General. 
 
ANESTHESIOLOGIST:  Stevo. ESTIMATED BLOOD LOSS:  Less than 25 mL COMPLICATIONS:  None. SPECIMENS REMOVED:  None. IMPLANTS:  ***. OPERATIVE PROCEDURE IN DETAIL:  The patient was taken to the operating room. After satisfactory general anesthesia established, he was moved onto the [place] table. A well leg goncalves was used on the right. Left leg was placed in the traction boot. Image intensification was used to confirm the initial position of the fracture in the AP and lateral positions. The leg was prepped with ChloraPrep and draped in a sterile fashion. The line of the screw path was traced on the skin using a marking pen with the image intensification in the AP and lateral positions. At the intersection at this point, a guidewire was placed through the skin down to bone and across the hip fracture. Four screws were  separately placed. They were measured for depth, they were reamed and appropriate size screws were placed. The 2 superior screws used to use a long thread, the more inferior use short threads. After all 4 screws had a good bite. Image intensification confirmed appropriate position of the screws and guidewires were removed. The wounds were closed with buried 3-0 Monocryl sutures followed by Prineo system. The patient tolerated the procedure well, was awakened from anesthesia and transferred to his recovery bed and taken to recovery room in stable condition. Leonarda SWEENEY 
D: 2019 17:32    
T: 2019 18:43 JOB #: [id number]

## 2019-01-04 NOTE — OP NOTES
[address] REPORT    Jovanni Guardado  MR#: [id number]  : 1935  ACCOUNT #: [de-identified]   DATE OF SERVICE: 2018    PREOPERATIVE DIAGNOSIS:  Left femoral neck fracture. POSTOPERATIVE DIAGNOSIS:  Left femoral neck fracture. PROCEDURE PERFORMED:  Multiple cannulated screws, left hip. SURGEON:  Radha Betancur MD    ASSISTANT:  JEFF Calderon     ANESTHESIA:  General.    ANESTHESIOLOGIST:  Stevo. ESTIMATED BLOOD LOSS:  Less than 25 mL    COMPLICATIONS:  None. SPECIMENS REMOVED:  None. IMPLANTS:  Cannulated screws x 4. OPERATIVE PROCEDURE IN DETAIL:  The patient was taken to the operating room. After satisfactory general anesthesia established, he was moved onto the [facility] table. A well leg goncalves was used on the right. Left leg was placed in the traction boot. Image intensification was used to confirm the initial position of the fracture in the AP and lateral positions. The leg was prepped with ChloraPrep and draped in a sterile fashion. The line of the screw path was traced on the skin using a marking pen with the image intensification in the AP and lateral positions. At the intersection at this point, a guidewire was placed through the skin down to bone and across the hip fracture. Four screws were  separately placed. They were measured for depth, they were reamed and appropriate size screws were placed. The 2 superior screws used to use a long thread, the more inferior use short threads. After all 4 screws had a good bite. Image intensification confirmed appropriate position of the screws and guidewires were removed. The wounds were closed with buried 3-0 Monocryl sutures followed by Prineo system. The patient tolerated the procedure well, was awakened from anesthesia and transferred to his recovery bed and taken to recovery room in stable condition.       Cheyanne SWEENEY  D: 2019 17:32     T: 2019 18:43  JOB #: [id number]

## 2019-01-12 ENCOUNTER — HOSPITAL ENCOUNTER (INPATIENT)
Age: 84
LOS: 5 days | Discharge: NURSING, REHAB OR LONG-TERM CARE | DRG: 482 | End: 2019-01-17
Attending: EMERGENCY MEDICINE | Admitting: HOSPITALIST
Payer: MEDICARE

## 2019-01-12 ENCOUNTER — APPOINTMENT (OUTPATIENT)
Dept: CT IMAGING | Age: 84
DRG: 482 | End: 2019-01-12
Attending: EMERGENCY MEDICINE
Payer: MEDICARE

## 2019-01-12 ENCOUNTER — APPOINTMENT (OUTPATIENT)
Dept: GENERAL RADIOLOGY | Age: 84
DRG: 482 | End: 2019-01-12
Attending: HOSPITALIST
Payer: MEDICARE

## 2019-01-12 ENCOUNTER — APPOINTMENT (OUTPATIENT)
Dept: GENERAL RADIOLOGY | Age: 84
DRG: 482 | End: 2019-01-12
Attending: EMERGENCY MEDICINE
Payer: MEDICARE

## 2019-01-12 DIAGNOSIS — S72.414A CLOSED NONDISPLACED FRACTURE OF CONDYLE OF RIGHT FEMUR, INITIAL ENCOUNTER (HCC): Primary | ICD-10-CM

## 2019-01-12 PROBLEM — S72.001A CLOSED RIGHT HIP FRACTURE (HCC): Status: ACTIVE | Noted: 2019-01-12

## 2019-01-12 LAB
ANION GAP SERPL CALC-SCNC: 7 MMOL/L (ref 3–18)
APPEARANCE UR: CLEAR
APTT PPP: 30.4 SEC (ref 23–36.4)
BASOPHILS # BLD: 0 K/UL (ref 0–0.1)
BASOPHILS NFR BLD: 0 % (ref 0–2)
BILIRUB UR QL: NEGATIVE
BUN SERPL-MCNC: 15 MG/DL (ref 7–18)
BUN/CREAT SERPL: 20 (ref 12–20)
CALCIUM SERPL-MCNC: 8.1 MG/DL (ref 8.5–10.1)
CHLORIDE SERPL-SCNC: 106 MMOL/L (ref 100–108)
CO2 SERPL-SCNC: 27 MMOL/L (ref 21–32)
COLOR UR: ABNORMAL
CREAT SERPL-MCNC: 0.76 MG/DL (ref 0.6–1.3)
DIFFERENTIAL METHOD BLD: ABNORMAL
EOSINOPHIL # BLD: 0.3 K/UL (ref 0–0.4)
EOSINOPHIL NFR BLD: 5 % (ref 0–5)
ERYTHROCYTE [DISTWIDTH] IN BLOOD BY AUTOMATED COUNT: 12.6 % (ref 11.6–14.5)
GLUCOSE SERPL-MCNC: 101 MG/DL (ref 74–99)
GLUCOSE UR STRIP.AUTO-MCNC: NEGATIVE MG/DL
HCT VFR BLD AUTO: 43.2 % (ref 36–48)
HGB BLD-MCNC: 14.1 G/DL (ref 13–16)
HGB UR QL STRIP: NEGATIVE
INR PPP: 1.1 (ref 0.8–1.2)
KETONES UR QL STRIP.AUTO: 15 MG/DL
LEUKOCYTE ESTERASE UR QL STRIP.AUTO: NEGATIVE
LYMPHOCYTES # BLD: 0.8 K/UL (ref 0.9–3.6)
LYMPHOCYTES NFR BLD: 12 % (ref 21–52)
MCH RBC QN AUTO: 30 PG (ref 24–34)
MCHC RBC AUTO-ENTMCNC: 32.6 G/DL (ref 31–37)
MCV RBC AUTO: 91.9 FL (ref 74–97)
MONOCYTES # BLD: 0.7 K/UL (ref 0.05–1.2)
MONOCYTES NFR BLD: 10 % (ref 3–10)
NEUTS SEG # BLD: 4.7 K/UL (ref 1.8–8)
NEUTS SEG NFR BLD: 73 % (ref 40–73)
NITRITE UR QL STRIP.AUTO: NEGATIVE
PH UR STRIP: 6.5 [PH] (ref 5–8)
PLATELET # BLD AUTO: 205 K/UL (ref 135–420)
PMV BLD AUTO: 10.1 FL (ref 9.2–11.8)
POTASSIUM SERPL-SCNC: 3.9 MMOL/L (ref 3.5–5.5)
PROT UR STRIP-MCNC: NEGATIVE MG/DL
PROTHROMBIN TIME: 13.7 SEC (ref 11.5–15.2)
RBC # BLD AUTO: 4.7 M/UL (ref 4.7–5.5)
SODIUM SERPL-SCNC: 140 MMOL/L (ref 136–145)
SP GR UR REFRACTOMETRY: 1.02 (ref 1–1.03)
UROBILINOGEN UR QL STRIP.AUTO: 0.2 EU/DL (ref 0.2–1)
WBC # BLD AUTO: 6.5 K/UL (ref 4.6–13.2)

## 2019-01-12 PROCEDURE — 65270000029 HC RM PRIVATE

## 2019-01-12 PROCEDURE — 93005 ELECTROCARDIOGRAM TRACING: CPT

## 2019-01-12 PROCEDURE — 85730 THROMBOPLASTIN TIME PARTIAL: CPT

## 2019-01-12 PROCEDURE — 73502 X-RAY EXAM HIP UNI 2-3 VIEWS: CPT

## 2019-01-12 PROCEDURE — 74011250637 HC RX REV CODE- 250/637: Performed by: HOSPITALIST

## 2019-01-12 PROCEDURE — 81003 URINALYSIS AUTO W/O SCOPE: CPT

## 2019-01-12 PROCEDURE — 85025 COMPLETE CBC W/AUTO DIFF WBC: CPT

## 2019-01-12 PROCEDURE — 74011000250 HC RX REV CODE- 250: Performed by: HOSPITALIST

## 2019-01-12 PROCEDURE — 71045 X-RAY EXAM CHEST 1 VIEW: CPT

## 2019-01-12 PROCEDURE — 85610 PROTHROMBIN TIME: CPT

## 2019-01-12 PROCEDURE — 73700 CT LOWER EXTREMITY W/O DYE: CPT

## 2019-01-12 PROCEDURE — 99285 EMERGENCY DEPT VISIT HI MDM: CPT

## 2019-01-12 PROCEDURE — 74011250636 HC RX REV CODE- 250/636: Performed by: HOSPITALIST

## 2019-01-12 PROCEDURE — 80048 BASIC METABOLIC PNL TOTAL CA: CPT

## 2019-01-12 RX ORDER — RANITIDINE 150 MG/1
150 TABLET, FILM COATED ORAL 2 TIMES DAILY
Status: DISCONTINUED | OUTPATIENT
Start: 2019-01-12 | End: 2019-01-17 | Stop reason: HOSPADM

## 2019-01-12 RX ORDER — CYCLOSPORINE 0.5 MG/ML
1 EMULSION OPHTHALMIC EVERY 12 HOURS
Status: DISCONTINUED | OUTPATIENT
Start: 2019-01-12 | End: 2019-01-17 | Stop reason: HOSPADM

## 2019-01-12 RX ORDER — ENOXAPARIN SODIUM 100 MG/ML
40 INJECTION SUBCUTANEOUS EVERY 24 HOURS
Status: DISCONTINUED | OUTPATIENT
Start: 2019-01-12 | End: 2019-01-17 | Stop reason: HOSPADM

## 2019-01-12 RX ORDER — CARBIDOPA AND LEVODOPA 25; 100 MG/1; MG/1
1.5 TABLET ORAL
Status: DISCONTINUED | OUTPATIENT
Start: 2019-01-12 | End: 2019-01-17 | Stop reason: HOSPADM

## 2019-01-12 RX ORDER — RASAGILINE 1 MG/1
1 TABLET ORAL
Status: DISCONTINUED | OUTPATIENT
Start: 2019-01-12 | End: 2019-01-17 | Stop reason: HOSPADM

## 2019-01-12 RX ORDER — PANTOPRAZOLE SODIUM 40 MG/1
40 TABLET, DELAYED RELEASE ORAL DAILY
Status: DISCONTINUED | OUTPATIENT
Start: 2019-01-13 | End: 2019-01-17 | Stop reason: HOSPADM

## 2019-01-12 RX ORDER — ACETAMINOPHEN 325 MG/1
650 TABLET ORAL
Status: DISCONTINUED | OUTPATIENT
Start: 2019-01-12 | End: 2019-01-17 | Stop reason: HOSPADM

## 2019-01-12 RX ORDER — OXYCODONE AND ACETAMINOPHEN 5; 325 MG/1; MG/1
1-2 TABLET ORAL
Status: DISCONTINUED | OUTPATIENT
Start: 2019-01-12 | End: 2019-01-17 | Stop reason: HOSPADM

## 2019-01-12 RX ORDER — SERTRALINE HYDROCHLORIDE 50 MG/1
50 TABLET, FILM COATED ORAL DAILY
Status: DISCONTINUED | OUTPATIENT
Start: 2019-01-13 | End: 2019-01-17 | Stop reason: HOSPADM

## 2019-01-12 RX ORDER — ENTACAPONE 200 MG/1
200 TABLET ORAL
Status: DISCONTINUED | OUTPATIENT
Start: 2019-01-12 | End: 2019-01-17 | Stop reason: HOSPADM

## 2019-01-12 RX ORDER — CARBIDOPA AND LEVODOPA 25; 100 MG/1; MG/1
0.5 TABLET ORAL 4 TIMES DAILY
Status: DISCONTINUED | OUTPATIENT
Start: 2019-01-12 | End: 2019-01-17 | Stop reason: HOSPADM

## 2019-01-12 RX ADMIN — RANITIDINE 150 MG: 150 TABLET ORAL at 20:12

## 2019-01-12 RX ADMIN — ACETAMINOPHEN 650 MG: 325 TABLET ORAL at 13:57

## 2019-01-12 RX ADMIN — CARBIDOPA AND LEVODOPA 0.5 TABLET: 25; 100 TABLET ORAL at 20:27

## 2019-01-12 RX ADMIN — ENTACAPONE 200 MG: 200 TABLET, FILM COATED ORAL at 20:12

## 2019-01-12 RX ADMIN — CARBIDOPA AND LEVODOPA 1.5 TABLET: 25; 100 TABLET ORAL at 20:12

## 2019-01-12 RX ADMIN — CARBIDOPA AND LEVODOPA 1.5 TABLET: 25; 100 TABLET ORAL at 15:47

## 2019-01-12 RX ADMIN — CYCLOSPORINE 1 DROP: 0.5 EMULSION OPHTHALMIC at 20:23

## 2019-01-12 RX ADMIN — ACETAMINOPHEN 650 MG: 325 TABLET ORAL at 20:12

## 2019-01-12 RX ADMIN — ENTACAPONE 200 MG: 200 TABLET, FILM COATED ORAL at 15:46

## 2019-01-12 RX ADMIN — ENOXAPARIN SODIUM 40 MG: 40 INJECTION SUBCUTANEOUS at 14:58

## 2019-01-12 RX ADMIN — CYCLOSPORINE 1 DROP: 0.5 EMULSION OPHTHALMIC at 13:57

## 2019-01-12 RX ADMIN — CARBIDOPA AND LEVODOPA 0.5 TABLET: 25; 100 TABLET ORAL at 12:54

## 2019-01-12 RX ADMIN — CARBIDOPA AND LEVODOPA 0.5 TABLET: 25; 100 TABLET ORAL at 15:47

## 2019-01-12 RX ADMIN — RASAGILINE 1 MG: 1 TABLET ORAL at 20:23

## 2019-01-12 NOTE — ED PROVIDER NOTES
EMERGENCY DEPARTMENT HISTORY AND PHYSICAL EXAM 
 
Date: 1/12/2019 Patient Name: Bruno Mcbride History of Presenting Illness Chief Complaint Patient presents with  Fall History Provided By: Patient and RN and patient's daughter Chief Complaint: Abran Grey Duration: yesterday Timing:  Worsening Location: bilateral hips Associated Symptoms: bilateral hip pain Additional History (Context):  
7:16 AM  
Bruno Mcbride is a 82 y.o. male with PMHX Parkinson's disease, prostate CA and PSHX left intramedullary hip pinning on 12/17/18 by Dr. Marc Minor who presents to the emergency department from [facility] for evaluation after multiple falls, onset yesterday. Associated sxs include bilateral hip pain. Pt states he does not remember the fall. Daughter is at bedside and states the rehab center reported falls x2 (last at 2:15 AM), one of which daughter states pt may have fallen out of a wheelchair. Pt was evaluated yesterday and XR of the right femur was unable to exclude an undisplaced subcapital fracture. HPI limited by the pts dementia. PCP: Quirino Freed, DO 
 
Current Outpatient Medications Medication Sig Dispense Refill  aspirin delayed-release 325 mg tablet Take 1 Tab by mouth two (2) times a day. 60 Tab 0  
 oxyCODONE-acetaminophen (PERCOCET) 5-325 mg per tablet Take 1-2 Tabs by mouth every four (4) hours as needed. Max Daily Amount: 12 Tabs. 45 Tab 0  
 pantoprazole (PROTONIX) 40 mg tablet Take 40 mg by mouth daily.  rasagiline (AZILECT) 1 mg tablet Take 1 mg by mouth nightly.  metaxalone (SKELAXIN) 400 mg tablet Take 1 Tab by mouth three (3) times daily as needed. 12 Tab 0  carbidopa-levodopa (SINEMET)  mg per tablet Take 0.5 Tabs by mouth four (4) times daily.  carbidopa-levodopa-entacapone (STALEVO) 37.5-150-200 mg tablet Take 1 Tab by mouth Before breakfast, lunch, dinner and at bedtime.  cycloSPORINE (RESTASIS) 0.05 % dpet Administer 1 Drop to both eyes every twelve (12) hours.  mirabegron ER (MYRBETRIQ) 50 mg ER tablet Take 50 mg by mouth daily.  raNITIdine hcl 150 mg capsule Take 150 mg by mouth two (2) times a day.  sertraline (ZOLOFT) 50 mg tablet Take 50 mg by mouth daily. Past History Past Medical History: 
Past Medical History:  
Diagnosis Date  At risk for falls  Cancer [facility])   
 prostate [address]  GERD (gastroesophageal reflux disease)  Parkinson disease ([facility] [address]  Psychiatric disorder   
 depression Past Surgical History: 
Past Surgical History:  
Procedure Laterality Date  HX CATARACT REMOVAL Bilateral   
 HX HERNIA REPAIR    
 x2  
 HX LAP CHOLECYSTECTOMY  HX VASECTOMY Family History: 
History reviewed. No pertinent family history. Social History: 
Social History Tobacco Use  Smoking status: Never Smoker  Smokeless tobacco: Never Used Substance Use Topics  Alcohol use: No  
  Frequency: Never  Drug use: No  
 
 
Allergies: Allergies Allergen Reactions  Other Medication Other (comments) Daughter asking for no narcotics due to pt's reaction to percocet and states pt has parkinson's.  Percocet [Oxycodone-Acetaminophen] Other (comments) \"confusion\" per daughter Review of Systems Review of Systems Unable to perform ROS: Dementia Musculoskeletal: Positive for arthralgias (bilateral hips). Psychiatric/Behavioral:  
     (+) dementia Physical Exam  
 
Vitals:  
 01/12/19 0705 01/12/19 0815 01/12/19 0830 01/12/19 0900 BP: 199/86 158/76 161/76 179/75 Pulse: 72 Resp: 13 Temp: 98.5 °F (36.9 °C) SpO2: 95% 97% Weight: 78.2 kg (172 lb 8 oz) Height: 5' 8\" (1.727 m) Physical Exam  
Nursing note and vitals reviewed. Vital signs and nursing notes reviewed CONSTITUTIONAL: Alert, in no apparent distress; well-developed; well-nourished. HEAD:  Normocephalic, atraumatic EYES: PERRL; EOM's intact. ENTM: Nose: no rhinorrhea; Throat: no erythema or exudate, mucous membranes moist 
Neck:  No JVD, supple without lymphadenopathy RESP: Chest clear, equal breath sounds. CV: S1 and S2 WNL; No murmurs, gallops or rubs. GI: Normal bowel sounds, abdomen soft and non-tender. No masses or organomegaly. : No costo-vertebral angle tenderness. BACK:  Non-tender UPPER EXT:  Normal inspection LOWER EXT: No edema, no calf tenderness. Distal pulses intact. TTP left hip, laterally and anteriorly. Right hip TTP anteriorly. NEURO: CN intact, reflexes 2/4 and sym, strength 5/5 and sym, sensation intact. SKIN: No rashes; Normal for age and stage. PSYCH:  Alert and oriented to person, normal affect. Diagnostic Study Results Labs - Recent Results (from the past 12 hour(s)) CBC WITH AUTOMATED DIFF Collection Time: 01/12/19  7:15 AM  
Result Value Ref Range WBC 6.5 4.6 - 13.2 K/uL  
 RBC 4.70 4.70 - 5.50 M/uL  
 HGB 14.1 13.0 - 16.0 g/dL HCT 43.2 36.0 - 48.0 % MCV 91.9 74.0 - 97.0 FL  
 MCH 30.0 24.0 - 34.0 PG  
 MCHC 32.6 31.0 - 37.0 g/dL  
 RDW 12.6 11.6 - 14.5 % PLATELET 222 905 - 901 K/uL MPV 10.1 9.2 - 11.8 FL  
 NEUTROPHILS 73 40 - 73 % LYMPHOCYTES 12 (L) 21 - 52 % MONOCYTES 10 3 - 10 % EOSINOPHILS 5 0 - 5 % BASOPHILS 0 0 - 2 %  
 ABS. NEUTROPHILS 4.7 1.8 - 8.0 K/UL  
 ABS. LYMPHOCYTES 0.8 (L) 0.9 - 3.6 K/UL  
 ABS. MONOCYTES 0.7 0.05 - 1.2 K/UL  
 ABS. EOSINOPHILS 0.3 0.0 - 0.4 K/UL  
 ABS. BASOPHILS 0.0 0.0 - 0.1 K/UL  
 DF AUTOMATED METABOLIC PANEL, BASIC Collection Time: 01/12/19  7:15 AM  
Result Value Ref Range Sodium 140 136 - 145 mmol/L Potassium 3.9 3.5 - 5.5 mmol/L Chloride 106 100 - 108 mmol/L  
 CO2 27 21 - 32 mmol/L Anion gap 7 3.0 - 18 mmol/L Glucose 101 (H) 74 - 99 mg/dL BUN 15 7.0 - 18 MG/DL  Creatinine 0.76 0.6 - 1.3 MG/DL  
 BUN/Creatinine ratio 20 12 - 20    
 GFR est AA >60 >60 ml/min/1.73m2 GFR est non-AA >60 >60 ml/min/1.73m2 Calcium 8.1 (L) 8.5 - 10.1 MG/DL PROTHROMBIN TIME + INR Collection Time: 01/12/19  7:15 AM  
Result Value Ref Range Prothrombin time 13.7 11.5 - 15.2 sec INR 1.1 0.8 - 1.2 PTT Collection Time: 01/12/19  7:15 AM  
Result Value Ref Range aPTT 30.4 23.0 - 36.4 SEC Radiologic Studies -  
CT HIPS BI WO CONT Final Result IMPRESSION:  
  
There are 4 screws present within the left femoral neck at the site of a femoral  
neck fracture. This femoral neck fracture is identified. No significant  
angulation is present. There is an acute fracture involving the neck of the right femur Lumbar spondylosis with lumbar spinal stenosis CT is approximately 86% sensitive for evaluating acute nondisplaced hip  
fractures. When CT detects no fracture and there is persistent clinical  
suspicion of an occult fracture, MRI is useful for more sensitive evaluation. XR HIP RT W OR WO PELV 2-3 VWS    (Results Pending) CT Results  (Last 48 hours) 01/12/19 0817  CT HIPS BI WO CONT Final result Impression:  IMPRESSION:  
   
There are 4 screws present within the left femoral neck at the site of a femoral  
neck fracture. This femoral neck fracture is identified. No significant  
angulation is present. There is an acute fracture involving the neck of the right femur Lumbar spondylosis with lumbar spinal stenosis CT is approximately 86% sensitive for evaluating acute nondisplaced hip  
fractures. When CT detects no fracture and there is persistent clinical  
suspicion of an occult fracture, MRI is useful for more sensitive evaluation. Narrative:  EXAM:  CT HIPS BI WO CONT  
   
CLINICAL HISTORY/INDICATION:  Hip pain, acute, fx suspect, xray negative or  
indeterminate  
-Additional:  Status post fall COMPARISON:  Plain films from 12/17/18 TECHNIQUE:  A CT scan of the right hip was performed. Images were obtained from  
the iliac crest through the pubic symphysis. Sagittal and coronal  
reconstructions were performed to better evaluate the osseous anatomy. One or  
more dose reduction techniques were used on this CT: automated exposure control,  
adjustment of the mAs and/or kVp according to patient size, and iterative  
reconstruction techniques. The specific techniques used on this CT exam have  
been documented in the patient's electronic medical record. Digital Imaging and  
Communications in Medicine (DICOM) format image data are available to  
nonaffiliated external healthcare facilities or entities on a secure, media  
free, reciprocally searchable basis with patient authorization for at least a  
12-month period after this study. _______________ FINDINGS:  
   
Osseous: The femoral heads are normally located within the acetabula. Bilateral  
hip degenerative changes are present. Screws are seen within the proximal left  
femur. These were placed at the site of a prior femoral neck fracture. This  
femoral neck fracture is again identified. No significant displacement or  
angulation is present. There is an acute fracture present involving the right  
femoral neck. Lumbar spondylosis is present. Lumbar spine stenosis is present  
involving the visualized lower lumbar spine. Pelvic Organs:  Seed implants are present within the prostate gland. GI Tract:  No obstruction or ileus Lymph Nodes:  No enlarged nodes Vasculature: Atherosclerosis Other:  Mild soft tissue swelling is evident adjacent to the iliopsoas tendon  
   
_______________ Medications given in the ED- Medications - No data to display Medical Decision Making I am the first provider for this patient.  
 
I reviewed the vital signs, available nursing notes, past medical history, past surgical history, family history and social history. Vital Signs-Reviewed the patient's vital signs. Pulse Oximetry Analysis - 95% on RA Cardiac Monitor: 
Rate: 66 bpm 
Rhythm: NSR Records Reviewed: Nursing Notes and Old Medical Records Provider Notes (Medical Decision Making): DDX: sprain, strain, fracture, contusion, and displaced hardware Procedures: 
Procedures ED Course:  
7:16 AM Initial assessment performed. The patients presenting problems have been discussed, and they are in agreement with the care plan formulated and outlined with them. I have encouraged them to ask questions as they arise throughout their visit. 9:23 AM Discussed patient's history, exam, and available diagnostics results with Jamarcus Welch MD, Orthopedic Surgery, who recommends an xray of the right hip, keep pt NPO, and requests that I discuss with family whether they prefer him do surgery or Dr. Doug Amador to do it on Monday. Family stated that they would prefer Dr. Doug Amador if possible. Dr. Lj Echeverria said he would text Dr. Doug Amador and ask that we try to contact him directly as well. 9:36 AM Discussed patient's history, exam, and available diagnostics results with Sarah Bonds MD, orthopedic surgeon, who recommends admission and he will put the pt on the OR schedule for Monday. 9:56 AM Discussed patient's history, exam, and available diagnostics results with Brayden Person MD, internal medicine, who agrees to admit to Medical.  
 
  
Discussion: Pt with right hip pain. Inconclusive XR from last night. CT today showed right femoral neck fx. Multiple discussions with orthopedic surgeons resulting in agreement that he will be admitted to Medicine with the plan that Dr. Doug Amador will operate on Monday. Discussed with Dr. Lj Echeverria and Dr. Doug Amador for surgical plan and discussed with Dr. Brandy Desai for admission to the hospital. Pt and family are agreeable with the plan. Diagnosis and Disposition Critical Care Time: N/A Core Measures: 
For Hospitalized Patients: 
 
1. Hospitalization Decision Time: The decision to hospitalize the patient was made by Kailash Baez MD at 9:40 AM on 1/12/2019 2. Aspirin: Aspirin was not given because the patient did not present with a stroke at the time of their Emergency Department evaluation 9:56 AM  Patient is being admitted to the hospital by Park Campoverde MD. The results of their tests and reasons for their admission have been discussed with them and/or available family. They convey agreement and understanding for the need to be admitted and for their admission diagnosis. CONDITIONS ON ADMISSION: 
Sepsis is not present at the time of admission. Deep Vein Thrombosis is not present at the time of admission. Thrombosis is not present at the time of admission. MRSA is not present at the time of admission. Wound infection is not present at the time of admission. Pressure Ulcer is not present at the time of admission. CLINICAL IMPRESSION: 
 
1. Closed nondisplaced fracture of condyle of right femur, initial encounter [facility] PLAN: 
1. ADMIT TO MEDICAL 
_______________________________ Attestations: This note is prepared by Lexa Cummings and Ami Khoury, acting as Scribe for Kailash Baez MD. 
 
Kailash Baez MD:  The scribe's documentation has been prepared under my direction and personally reviewed by me in its entirety. I confirm that the note above accurately reflects all work, treatment, procedures, and medical decision making performed by me. 
_______________________________

## 2019-01-12 NOTE — ED NOTES
Daughter asking if she can give pt his morning meds for parkinson's, per Dr. Tali Mcfadden, ok for pt to have meds. Pt noted to have tremors in his hands.

## 2019-01-12 NOTE — ED NOTES
Skin assessment completed, bilateral CNS intact to lower extremities with pedal pulses palpable. No skin breakdown noted. Pt does have healing wound to left hip with glue type dressing still intact, no redness, swelling or drainage noted. Pt requesting to use restroom. Pt offered urinal, able to void, pt voided clear, dark nahun urine. Brief in place.

## 2019-01-12 NOTE — ROUTINE PROCESS
TRANSFER - IN REPORT: 
 
Verbal report received from Dusty Fields (name) on Gela Algonquin  being received from [address] [place] [address](unit) for routine progression of care Report consisted of patients Situation, Background, Assessment and  
Recommendations(SBAR). Information from the following report(s) SBAR was reviewed with the receiving nurse. Opportunity for questions and clarification was provided. Assessment completed upon patients arrival to unit and care assumed.

## 2019-01-12 NOTE — ROUTINE PROCESS
TRANSFER - OUT REPORT: 
 
Verbal report given to Denise Cole on Ana M Padilla  being transferred to [facility] for closed right hip fracture. Report consisted of patients Situation, Background, Assessment and  
Recommendations(SBAR). Information from the following report(s) SBAR and ED Summary was reviewed with the receiving nurse. Lines:  
Peripheral IV 01/12/19 Right Arm (Active) Site Assessment Clean, dry, & intact 1/12/2019  7:28 AM  
Phlebitis Assessment 0 1/12/2019  7:28 AM  
Infiltration Assessment 0 1/12/2019  7:28 AM  
Dressing Status Clean, dry, & intact 1/12/2019  7:28 AM  
Dressing Type Tape;Transparent 1/12/2019  7:28 AM  
Hub Color/Line Status Green 1/12/2019  7:28 AM  
Action Taken Blood drawn 1/12/2019  7:28 AM  
Alcohol Cap Used Yes 1/12/2019  7:28 AM  
  
 
Opportunity for questions and clarification was provided. Patient transported with: 
 [employer]

## 2019-01-12 NOTE — H&P
History & Physical 
Patient: Ermalene Romberg MRN: [id number]  CSN: [id number] YOB: 1935  Age: 82 y.o. Sex: male DOA: 1/12/2019 Primary Care Provider:  Lety Rodriguez,  Assessment/Plan Patient Active Problem List  
Diagnosis Code  Parkinson disease ([facility] G20  
 Closed right hip fracture ([facility] S72.001A  GERD (gastroesophageal reflux disease) K21.9  Falls W19. Shelia Mendenhall Admit to medical floor. Left hip fracture - orthopedics consulted by ER, plan for surgical intervention by orthopedics Will get pre op EKG, CXR. parkinsons disease - continue with home medications. GERD - continue with PPI. Dementia Fall precautions DVT prophylaxis, Estimated length of stay : 3 days CC: fall hip fracture HPI:  
 
Ermalene Romberg is a 82 y.o. male who has past history of parkinsons, prostate ca, right hip fracture s/p IM nailing 12/2018, GERD presents to ER s/p fall. Patient has dementia and not able to provide history, no faimly at bedside. [place] [facility] [place]. He has been complaining of left hip pain. In ER x-ray showed left hip fracture Past Medical History:  
Diagnosis Date  At risk for falls  Cancer [facility])   
 prostate [address]  GERD (gastroesophageal reflux disease)  Parkinson disease ([facility]  Psychiatric disorder   
 depression Past Surgical History:  
Procedure Laterality Date  HX CATARACT REMOVAL Bilateral   
 HX HERNIA REPAIR    
 x2  
 HX LAP CHOLECYSTECTOMY  HX VASECTOMY History reviewed. No pertinent family history. Social History Socioeconomic History  Marital status:  Spouse name: Not on file  Number of children: Not on file  Years of education: Not on file  Highest education level: Not on file Tobacco Use  Smoking status: Never Smoker  Smokeless tobacco: Never Used Substance and Sexual Activity  Alcohol use:  No  
 Frequency: Never  Drug use: No  
 Sexual activity: No  
 
 
Prior to Admission medications Medication Sig Start Date End Date Taking? Authorizing Provider  
aspirin delayed-release 325 mg tablet Take 1 Tab by mouth two (2) times a day. 12/19/18   Marylin Elizondo PA-C  
oxyCODONE-acetaminophen (PERCOCET) 5-325 mg per tablet Take 1-2 Tabs by mouth every four (4) hours as needed. Max Daily Amount: 12 Tabs. 12/19/18   Marylin Elizondo PA-C  
pantoprazole (PROTONIX) 40 mg tablet Take 40 mg by mouth daily. Provider, Historical  
rasagiline (AZILECT) 1 mg tablet Take 1 mg by mouth nightly. Provider, Historical  
metaxalone (SKELAXIN) 400 mg tablet Take 1 Tab by mouth three (3) times daily as needed. 12/10/18   JEFF Hagan  
carbidopa-levodopa (SINEMET)  mg per tablet Take 0.5 Tabs by mouth four (4) times daily. Cha Paula MD  
carbidopa-levodopa-entacapone (STALEVO) 37.5-150-200 mg tablet Take 1 Tab by mouth Before breakfast, lunch, dinner and at bedtime. Cha Paula MD  
cycloSPORINE (RESTASIS) 0.05 % dpet Administer 1 Drop to both eyes every twelve (12) hours. Cha Paula MD  
mirabegron ER (MYRBETRIQ) 50 mg ER tablet Take 50 mg by mouth daily. Cha Paula MD  
raNITIdine hcl 150 mg capsule Take 150 mg by mouth two (2) times a day. Cha Paula MD  
sertraline (ZOLOFT) 50 mg tablet Take 50 mg by mouth daily. Cha Paula MD  
 
 
Allergies Allergen Reactions  Other Medication Other (comments) Daughter asking for no narcotics due to pt's reaction to percocet and states pt has parkinson's.  Percocet [Oxycodone-Acetaminophen] Other (comments) \"confusion\" per daughter Review of Systems Gen: No fever, chills, malaise, weight loss/gain. Heent: No headache, rhinorrhea, epistaxis, ear pain, hearing loss, sinus pain, neck pain/stiffness, sore throat. Heart: No chest pain, palpitations, CARRANZA, pnd, or orthopnea. Resp: No cough, hemoptysis, wheezing and shortness of breath. GI: No nausea, vomiting, diarrhea, constipation, melena or hematochezia. : No urinary obstruction, dysuria or hematuria. Derm: No rash, new skin lesion or pruritis. Musc/skeletal: see above. Vasc: No edema, cyanosis or claudication. Endo: No heat/cold intolerance, no polyuria,polydipsia or polyphagia. Neuro: No unilateral weakness, numbness, tingling. No seizures. See above Heme: No easy bruising or bleeding. Physical Exam:  
 
Physical Exam: 
Visit Vitals /68 (BP 1 Location: Left arm, BP Patient Position: At rest) Pulse 71 Temp 97.7 °F (36.5 °C) Resp 16 Ht 5' 8\" (1.727 m) Wt 78.2 kg (172 lb 8 oz) SpO2 98% BMI 26.23 kg/m² Temp (24hrs), Av.1 °F (36.7 °C), Min:97.7 °F (36.5 °C), Max:98.5 °F (36.9 °C) No intake/output data recorded. No intake/output data recorded. General:  Awake, cooperative, no distress. Head:  Normocephalic, without obvious abnormality, atraumatic. Eyes:  Conjunctivae/corneas clear, sclera anicteric, PERRL, EOMs intact. Nose: Nares normal. No drainage or sinus tenderness. Throat: Lips, mucosa, and tongue normal.   
Neck: Supple, symmetrical, trachea midline, no adenopathy. Lungs:   Clear to auscultation bilaterally. Heart:   S1, S2, + murmur, click, rub or gallop. Abdomen: Soft, non-tender. Bowel sounds normal. No masses,  No organomegaly. Extremities: Extremities normal, atraumatic, no cyanosis or edema. Capillary refill normal.  
Pulses: 2+ and symmetric all extremities. Skin: Skin color pink, turgor normal. No rashes or lesions Neurologic: CNII-XII intact. No focal motor or sensory deficit. Labs Reviewed: 
 
CMP:  
Lab Results Component Value Date/Time   2019 07:15 AM  
 K 3.9 2019 07:15 AM  
  2019 07:15 AM  
 CO2 27 2019 07:15 AM  
 AGAP 7 2019 07:15 AM  
  (H) 01/12/2019 07:15 AM  
 BUN 15 01/12/2019 07:15 AM  
 CREA 0.76 01/12/2019 07:15 AM  
 GFRAA >60 01/12/2019 07:15 AM  
 GFRNA >60 01/12/2019 07:15 AM  
 CA 8.1 (L) 01/12/2019 07:15 AM  
 
CBC:  
Lab Results Component Value Date/Time WBC 6.5 01/12/2019 07:15 AM  
 HGB 14.1 01/12/2019 07:15 AM  
 HCT 43.2 01/12/2019 07:15 AM  
  01/12/2019 07:15 AM  
 
 
 
Procedures/imaging: see electronic medical records for all procedures/Xrays and details which were not copied into this note but were reviewed prior to creation of Plan CC: Ryan Nowak DO

## 2019-01-12 NOTE — ROUTINE PROCESS
Paged for STAT EKG as per MD order, will wait for call back. 1127 - Respiratory called back, informed them of order 5 - Pt's daughter refusing lovenox dose for pt at this time. Will continue to monitor pt. Also has request for med times. Will call pharmacy to adjust 
 
95 [place] [place]od page to Dr Houston Pretty regarding pt's home medication order per pt's family member 1315 - Sent message to retime meds per pt requests 26 - Spoke to pt's daughter Alize Plunkett, POA/pt's daughter, insistent that pt was prescribed protonix AND zantac by PCP. Clarified multiple times with pt's POA, and she is requesting that zantac AND protonix be ordered bc that is what he takes at home. Informed MD of this, states okay to order. 46 - Staff RN from pt's facility called regarding information about pt, Alize Plunkett (pt's POA) states to not release information regarding pt.  RN only allowed to release information Dr Bird Marin and treatment team.

## 2019-01-12 NOTE — PROGRESS NOTES
Problem: Falls - Risk of 
Goal: *Absence of Falls Document Karime Raza Fall Risk and appropriate interventions in the flowsheet. Outcome: Progressing Towards Goal 
Fall Risk Interventions: 
Mobility Interventions: Assess mobility with egress test 
 
Mentation Interventions: Adequate sleep, hydration, pain control Medication Interventions: Patient to call before getting OOB Elimination Interventions: Patient to call for help with toileting needs History of Falls Interventions: Bed/chair exit alarm

## 2019-01-12 NOTE — PROGRESS NOTES
DC Plan: TBD Chart reviewed. Pt admitted by hospitalist to med/surg for femur fx. CM will follow for transition of care needs. Reason for Admission:   Closed nondisplaced fracture of condyle of right femur, initial encounter RRAT Score:     low Plan for utilizing home health[facility], pending PT/OT eval and recommendations Likelihood of Readmission:  Low, mod Transition of Care Plan:      TBD Care Management Interventions Transition of Care Consult (CM Consult): Discharge Planning

## 2019-01-12 NOTE — ED TRIAGE NOTES
Patient arrived via EMS from [facility]. Patient had fallen yesterday and did an xray at 2000 last night and now needs a another xray. Patient xray imaging findings report he cannot exclude  An undisplaced subcapital fracture of the right femur. B/p was 192/98 99 pulse per ems. Patient arrived via EMS patient reports he does not recall what happened he does know he is at [facility]. Patient has glue to left hip

## 2019-01-13 PROCEDURE — 74011250637 HC RX REV CODE- 250/637: Performed by: HOSPITALIST

## 2019-01-13 PROCEDURE — 74011250636 HC RX REV CODE- 250/636: Performed by: HOSPITALIST

## 2019-01-13 PROCEDURE — 65270000029 HC RM PRIVATE

## 2019-01-13 PROCEDURE — 74011000250 HC RX REV CODE- 250: Performed by: HOSPITALIST

## 2019-01-13 RX ADMIN — ENOXAPARIN SODIUM 40 MG: 40 INJECTION SUBCUTANEOUS at 12:59

## 2019-01-13 RX ADMIN — ACETAMINOPHEN 650 MG: 325 TABLET ORAL at 03:06

## 2019-01-13 RX ADMIN — CARBIDOPA AND LEVODOPA 0.5 TABLET: 25; 100 TABLET ORAL at 12:59

## 2019-01-13 RX ADMIN — CARBIDOPA AND LEVODOPA 1.5 TABLET: 25; 100 TABLET ORAL at 09:21

## 2019-01-13 RX ADMIN — CYCLOSPORINE 1 DROP: 0.5 EMULSION OPHTHALMIC at 20:06

## 2019-01-13 RX ADMIN — SERTRALINE HYDROCHLORIDE 50 MG: 50 TABLET ORAL at 09:19

## 2019-01-13 RX ADMIN — CARBIDOPA AND LEVODOPA 1.5 TABLET: 25; 100 TABLET ORAL at 20:04

## 2019-01-13 RX ADMIN — MIRABEGRON 50 MG: 25 TABLET, FILM COATED, EXTENDED RELEASE ORAL at 09:19

## 2019-01-13 RX ADMIN — RASAGILINE 1 MG: 1 TABLET ORAL at 20:05

## 2019-01-13 RX ADMIN — CARBIDOPA AND LEVODOPA 0.5 TABLET: 25; 100 TABLET ORAL at 09:20

## 2019-01-13 RX ADMIN — ACETAMINOPHEN 650 MG: 325 TABLET ORAL at 15:44

## 2019-01-13 RX ADMIN — RANITIDINE 150 MG: 150 TABLET ORAL at 20:05

## 2019-01-13 RX ADMIN — ENTACAPONE 200 MG: 200 TABLET, FILM COATED ORAL at 20:06

## 2019-01-13 RX ADMIN — CARBIDOPA AND LEVODOPA 1.5 TABLET: 25; 100 TABLET ORAL at 13:00

## 2019-01-13 RX ADMIN — ENTACAPONE 200 MG: 200 TABLET, FILM COATED ORAL at 15:44

## 2019-01-13 RX ADMIN — PANTOPRAZOLE SODIUM 40 MG: 40 TABLET, DELAYED RELEASE ORAL at 09:20

## 2019-01-13 RX ADMIN — ENTACAPONE 200 MG: 200 TABLET, FILM COATED ORAL at 12:59

## 2019-01-13 RX ADMIN — ENTACAPONE 200 MG: 200 TABLET, FILM COATED ORAL at 09:19

## 2019-01-13 RX ADMIN — RANITIDINE 150 MG: 150 TABLET ORAL at 09:19

## 2019-01-13 RX ADMIN — CARBIDOPA AND LEVODOPA 0.5 TABLET: 25; 100 TABLET ORAL at 15:45

## 2019-01-13 RX ADMIN — CARBIDOPA AND LEVODOPA 1.5 TABLET: 25; 100 TABLET ORAL at 15:45

## 2019-01-13 RX ADMIN — ACETAMINOPHEN 650 MG: 325 TABLET ORAL at 09:40

## 2019-01-13 RX ADMIN — ACETAMINOPHEN 650 MG: 325 TABLET ORAL at 21:40

## 2019-01-13 RX ADMIN — CARBIDOPA AND LEVODOPA 0.5 TABLET: 25; 100 TABLET ORAL at 20:04

## 2019-01-13 RX ADMIN — CYCLOSPORINE 1 DROP: 0.5 EMULSION OPHTHALMIC at 09:40

## 2019-01-13 NOTE — PROGRESS NOTES
0745-received report from [address] included SBAR MAR and Kardex. Shift summary-no change in pt status. Medicated for pain with tylenol with good result. Pt bathed by family. Family in attendance most of the day. Bedside and Verbal shift change report given to Randy Almonte RN (oncoming nurse) by Josiane Dan RN (offgoing nurse). Report included the following information SBAR, Kardex and MAR.

## 2019-01-13 NOTE — PROGRESS NOTES
Progress Note Patient: Jillene Schirmer MRN: [id number]  SSN: [id number] YOB: 1935  Age: 82 y.o. Sex: male * No surgery found * status post  
 
Admit Date: 2019 Admit Diagnosis: Closed right hip fracture ([address] Subjective:   
 
Patient is not a good historian and has difficulty communicating clearly. Seems to be doing well. In no apparent pain or distress. No complaints. No SOB. No Chest Pain. No Nausea or Vomiting. No problems eating or voiding. Objective:  
  
 
Temp (24hrs), Av °F (36.7 °C), Min:97.5 °F (36.4 °C), Max:98.8 °F (37.1 °C) Body mass index is 24.66 kg/m². Patient Vitals for the past 12 hrs: 
 BP Temp Pulse Resp SpO2 Weight 19 0257 181/88 97.6 °F (36.4 °C) 77 18 96 %   
19 2326 123/60 98.2 °F (36.8 °C) 73 18 97 % 73.6 kg (162 lb 3.2 oz) Recent Labs  
  19 
0715 HGB 14.1 HCT 43.2 INR 1.1   
K 3.9  CO2 27 BUN 15  
CREA 0.76 * Physical Exam: 
Vital Signs are Stable. No Acute Distress. Alert and Oriented. Negative Homans sign. Toes AROM Full. Neurovascular exam is normal.   
Dressing is Clean, Dry, and Intact. Assessment/Plan: 1. Non-displaced right femoral neck fracture, scheduled for pinning with Dr. Thelma Cleaning tomorrow 2. Continue pain control until surgery 3. Non weight bearing 4. Npo after midnight tonight Signed By: JEFF La   
 2019

## 2019-01-13 NOTE — ROUTINE PROCESS
Bedside and Verbal shift change report given to MACKENZIE Park RN (oncoming nurse) by Pippa Ford RN (offgoing nurse). Report included the following information SBAR, Kardex, Intake/Output and MAR.

## 2019-01-13 NOTE — PROGRESS NOTES
Hospitalist Progress Note Patient: Yuri Riley MRN: [id number]  CSN: [id number] YOB: 1935  Age: 82 y.o. Sex: male DOA: 1/12/2019 LOS:  LOS: 1 day Assessment/Plan Patient Active Problem List  
Diagnosis Code  Parkinson disease ([facility] G20  
 Closed right hip fracture ([facility] S72.001A  GERD (gastroesophageal reflux disease) K21.9  Falls W19. Timoteo Del  
  
 
83 yo male admitted for fall and right hip fracture 
 
pleasantly demented Left hip fracture - orthopedics consulted by ER, plan for surgical intervention by orthopedics EKG and CXR reviewed, He had surgery for left hip in December.  
  
parkinsons disease - continue with home medications.  
  
GERD - continue with PPI. 
  
Dementia 
  
Fall precautions  
  
DVT prophylaxis, Disposition : 2 days Review of systems General: No fevers or chills. Cardiovascular: No chest pain or pressure. No palpitations. Pulmonary: No shortness of breath. Gastrointestinal: No nausea, vomiting. Physical Exam: 
General: Awake, cooperative, no acute distress   
HEENT: NC, Atraumatic. PERRLA, anicteric sclerae. Lungs: CTA Bilaterally. No Wheezing/Rhonchi/Rales. Heart:  S1 S2,  No murmur, No Rubs, No Gallops Abdomen: Soft, Non distended, Non tender.  +Bowel sounds, Extremities: No c/c/e Psych:   Not anxious or agitated. Neurologic:  No acute neurological deficit. Vital signs/Intake and Output: 
Visit Vitals /68 (BP 1 Location: Right arm, BP Patient Position: At rest) Pulse 75 Temp 98.3 °F (36.8 °C) Resp 16 Ht 5' 8\" (1.727 m) Wt 73.6 kg (162 lb 3.2 oz) SpO2 96% BMI 24.66 kg/m² Current Shift:  01/13 0701 - 01/13 1900 In: -  
Out: 290 [Urine:240] Last three shifts:  01/11 1901 - 01/13 0700 In: -  
Out: 125 [Urine:125] Labs: Results:  
   
Chemistry Recent Labs  
  01/12/19 
0715 *   
K 3.9  CO2 27 BUN 15  
CREA 0.76 CA 8.1*  
 AGAP 7  
BUCR 20 CBC w/Diff Recent Labs  
  01/12/19 
0715 WBC 6.5  
RBC 4.70 HGB 14.1 HCT 43.2  GRANS 73 LYMPH 12* EOS 5 Cardiac Enzymes No results for input(s): CPK, CKND1, RUBIA in the last 72 hours. No lab exists for component: Michelle Jonas Coagulation Recent Labs  
  01/12/19 
0715 PTP 13.7 INR 1.1 APTT 30.4 Lipid Panel No results found for: CHOL, CHOLPOCT, CHOLX, CHLST, CHOLV, 854802, HDL, LDL, LDLC, DLDLP, 800093, VLDLC, VLDL, TGLX, TRIGL, TRIGP, TGLPOCT, CHHD, CHHDX  
BNP No results for input(s): BNPP in the last 72 hours. Liver Enzymes No results for input(s): TP, ALB, TBIL, AP, SGOT, GPT in the last 72 hours. No lab exists for component: DBIL Thyroid Studies No results found for: T4, T3U, TSH, TSHEXT Procedures/imaging: see electronic medical records for all procedures/Xrays and details which were not copied into this note but were reviewed prior to creation of Plan

## 2019-01-13 NOTE — PROGRESS NOTES
Occupational Therapy Screening: 
Services are indicated. Evaluate and Treat Order is requested. An InBasket screening referral was triggered for occupational therapy based on results obtained during the nursing admission assessment. The patients chart was reviewed and the patient is appropriate for a skilled therapy evaluation. Pt w/ non-displaced R femoral neck fx w/ scheduling pinning w/ Dr. Justin Acevedo (01/14/19). Will await orders w/ wt/bearing s/p surgical intervention. Thank you.  
Akosua Manning, OTR/L

## 2019-01-13 NOTE — PROGRESS NOTES
2000 - Daughter at the bedside. Scheduled PO meds given; pt tolerated. Assessment complete. Pain rated 6/10; prn po medications also given. Bed in lowest position and bed alarm activated. 2300 - Pt restless and increasingly confused. Tv on per request, lights on and door open. Will continue to monitor. 0100 - Bed alarm sounding off. Pt restless and fidgety in bed. Offered urinal. Bed alarm reactivated. 0200 - Pt resting quietly in bed. 
 
0250 - Pt found in bed with blood on sheets and IV pulled out.  
 
0300 - New IV access obtained to R forearm. Site wrapped with mika. PO Tylenol given for pain. 0430 -  Resting quietly. No needs at this time. 0600 - Bed alarm sounding off. Pt threw linen off of bed, linen soiled with urine. Incontinence care provided and linen changed. Bed alarm activated.

## 2019-01-14 ENCOUNTER — APPOINTMENT (OUTPATIENT)
Dept: GENERAL RADIOLOGY | Age: 84
DRG: 482 | End: 2019-01-14
Attending: ORTHOPAEDIC SURGERY
Payer: MEDICARE

## 2019-01-14 ENCOUNTER — ANESTHESIA (OUTPATIENT)
Dept: SURGERY | Age: 84
DRG: 482 | End: 2019-01-14
Payer: MEDICARE

## 2019-01-14 ENCOUNTER — ANESTHESIA EVENT (OUTPATIENT)
Dept: SURGERY | Age: 84
DRG: 482 | End: 2019-01-14
Payer: MEDICARE

## 2019-01-14 ENCOUNTER — APPOINTMENT (OUTPATIENT)
Dept: GENERAL RADIOLOGY | Age: 84
DRG: 482 | End: 2019-01-14
Attending: PHYSICIAN ASSISTANT
Payer: MEDICARE

## 2019-01-14 LAB
ANION GAP SERPL CALC-SCNC: 5 MMOL/L (ref 3–18)
BUN SERPL-MCNC: 15 MG/DL (ref 7–18)
BUN/CREAT SERPL: 16 (ref 12–20)
CALCIUM SERPL-MCNC: 7.8 MG/DL (ref 8.5–10.1)
CHLORIDE SERPL-SCNC: 109 MMOL/L (ref 100–108)
CO2 SERPL-SCNC: 29 MMOL/L (ref 21–32)
CREAT SERPL-MCNC: 0.91 MG/DL (ref 0.6–1.3)
GLUCOSE SERPL-MCNC: 176 MG/DL (ref 74–99)
HCT VFR BLD AUTO: 40.8 % (ref 36–48)
HGB BLD-MCNC: 13.2 G/DL (ref 13–16)
POTASSIUM SERPL-SCNC: 3.9 MMOL/L (ref 3.5–5.5)
SODIUM SERPL-SCNC: 143 MMOL/L (ref 136–145)

## 2019-01-14 PROCEDURE — 36415 COLL VENOUS BLD VENIPUNCTURE: CPT

## 2019-01-14 PROCEDURE — 74011250636 HC RX REV CODE- 250/636: Performed by: ANESTHESIOLOGY

## 2019-01-14 PROCEDURE — 76060000034 HC ANESTHESIA 1.5 TO 2 HR: Performed by: ORTHOPAEDIC SURGERY

## 2019-01-14 PROCEDURE — 77010033678 HC OXYGEN DAILY

## 2019-01-14 PROCEDURE — C1713 ANCHOR/SCREW BN/BN,TIS/BN: HCPCS | Performed by: ORTHOPAEDIC SURGERY

## 2019-01-14 PROCEDURE — 77030031139 HC SUT VCRL2 J&J -A: Performed by: ORTHOPAEDIC SURGERY

## 2019-01-14 PROCEDURE — 77030012407 HC DRN WND BARD -B: Performed by: ORTHOPAEDIC SURGERY

## 2019-01-14 PROCEDURE — 74011250636 HC RX REV CODE- 250/636: Performed by: ORTHOPAEDIC SURGERY

## 2019-01-14 PROCEDURE — C1769 GUIDE WIRE: HCPCS | Performed by: ORTHOPAEDIC SURGERY

## 2019-01-14 PROCEDURE — 74011000250 HC RX REV CODE- 250

## 2019-01-14 PROCEDURE — 76210000017 HC OR PH I REC 1.5 TO 2 HR: Performed by: ORTHOPAEDIC SURGERY

## 2019-01-14 PROCEDURE — 77030013708 HC HNDPC SUC IRR PULS STRY –B: Performed by: ORTHOPAEDIC SURGERY

## 2019-01-14 PROCEDURE — 77030006835 HC BLD SAW SAG STRY -B: Performed by: ORTHOPAEDIC SURGERY

## 2019-01-14 PROCEDURE — 77030005518 HC CATH URETH FOL 2W BARD -B: Performed by: ORTHOPAEDIC SURGERY

## 2019-01-14 PROCEDURE — 74011250637 HC RX REV CODE- 250/637: Performed by: HOSPITALIST

## 2019-01-14 PROCEDURE — 65270000029 HC RM PRIVATE

## 2019-01-14 PROCEDURE — 80048 BASIC METABOLIC PNL TOTAL CA: CPT

## 2019-01-14 PROCEDURE — 77030032490 HC SLV COMPR SCD KNE COVD -B: Performed by: ORTHOPAEDIC SURGERY

## 2019-01-14 PROCEDURE — 74011250636 HC RX REV CODE- 250/636

## 2019-01-14 PROCEDURE — 74011000250 HC RX REV CODE- 250: Performed by: ORTHOPAEDIC SURGERY

## 2019-01-14 PROCEDURE — 77030013728 HC IRR FEM CNL STRY -B: Performed by: ORTHOPAEDIC SURGERY

## 2019-01-14 PROCEDURE — 74011250636 HC RX REV CODE- 250/636: Performed by: PHYSICIAN ASSISTANT

## 2019-01-14 PROCEDURE — 77030016458 HC BIT DRL CANN1 SYNT -F: Performed by: ORTHOPAEDIC SURGERY

## 2019-01-14 PROCEDURE — 0QH634Z INSERTION OF INTERNAL FIXATION DEVICE INTO RIGHT UPPER FEMUR, PERCUTANEOUS APPROACH: ICD-10-PCS | Performed by: ORTHOPAEDIC SURGERY

## 2019-01-14 PROCEDURE — 77030008462 HC STPLR SKN PROX J&J -A: Performed by: ORTHOPAEDIC SURGERY

## 2019-01-14 PROCEDURE — 73501 X-RAY EXAM HIP UNI 1 VIEW: CPT

## 2019-01-14 PROCEDURE — 74011000250 HC RX REV CODE- 250: Performed by: HOSPITALIST

## 2019-01-14 PROCEDURE — 76010000153 HC OR TIME 1.5 TO 2 HR: Performed by: ORTHOPAEDIC SURGERY

## 2019-01-14 PROCEDURE — 85018 HEMOGLOBIN: CPT

## 2019-01-14 DEVICE — SCREW BNE L95MM DIA7.3MM THRD L16MM CANC S STL SELF DRL ST: Type: IMPLANTABLE DEVICE | Site: HIP | Status: FUNCTIONAL

## 2019-01-14 DEVICE — IMPLANTABLE DEVICE: Type: IMPLANTABLE DEVICE | Site: HIP | Status: FUNCTIONAL

## 2019-01-14 RX ORDER — SODIUM CHLORIDE 0.9 % (FLUSH) 0.9 %
5-40 SYRINGE (ML) INJECTION AS NEEDED
Status: DISCONTINUED | OUTPATIENT
Start: 2019-01-14 | End: 2019-01-17 | Stop reason: HOSPADM

## 2019-01-14 RX ORDER — ONDANSETRON 2 MG/ML
4 INJECTION INTRAMUSCULAR; INTRAVENOUS ONCE
Status: DISCONTINUED | OUTPATIENT
Start: 2019-01-14 | End: 2019-01-14 | Stop reason: HOSPADM

## 2019-01-14 RX ORDER — INSULIN LISPRO 100 [IU]/ML
INJECTION, SOLUTION INTRAVENOUS; SUBCUTANEOUS ONCE
Status: DISCONTINUED | OUTPATIENT
Start: 2019-01-14 | End: 2019-01-14 | Stop reason: HOSPADM

## 2019-01-14 RX ORDER — SODIUM CHLORIDE, SODIUM LACTATE, POTASSIUM CHLORIDE, CALCIUM CHLORIDE 600; 310; 30; 20 MG/100ML; MG/100ML; MG/100ML; MG/100ML
125 INJECTION, SOLUTION INTRAVENOUS CONTINUOUS
Status: DISCONTINUED | OUTPATIENT
Start: 2019-01-14 | End: 2019-01-14 | Stop reason: HOSPADM

## 2019-01-14 RX ORDER — LIDOCAINE HYDROCHLORIDE 20 MG/ML
INJECTION, SOLUTION EPIDURAL; INFILTRATION; INTRACAUDAL; PERINEURAL AS NEEDED
Status: DISCONTINUED | OUTPATIENT
Start: 2019-01-14 | End: 2019-01-14 | Stop reason: HOSPADM

## 2019-01-14 RX ORDER — FENTANYL CITRATE 50 UG/ML
25 INJECTION, SOLUTION INTRAMUSCULAR; INTRAVENOUS
Status: ACTIVE | OUTPATIENT
Start: 2019-01-14 | End: 2019-01-14

## 2019-01-14 RX ORDER — ONDANSETRON 2 MG/ML
INJECTION INTRAMUSCULAR; INTRAVENOUS AS NEEDED
Status: DISCONTINUED | OUTPATIENT
Start: 2019-01-14 | End: 2019-01-14 | Stop reason: HOSPADM

## 2019-01-14 RX ORDER — SODIUM CHLORIDE 0.9 % (FLUSH) 0.9 %
5-40 SYRINGE (ML) INJECTION EVERY 8 HOURS
Status: DISCONTINUED | OUTPATIENT
Start: 2019-01-14 | End: 2019-01-14 | Stop reason: HOSPADM

## 2019-01-14 RX ORDER — HYDROMORPHONE HYDROCHLORIDE 2 MG/ML
0.5 INJECTION, SOLUTION INTRAMUSCULAR; INTRAVENOUS; SUBCUTANEOUS
Status: DISCONTINUED | OUTPATIENT
Start: 2019-01-14 | End: 2019-01-14 | Stop reason: HOSPADM

## 2019-01-14 RX ORDER — MAGNESIUM SULFATE 100 %
4 CRYSTALS MISCELLANEOUS AS NEEDED
Status: DISCONTINUED | OUTPATIENT
Start: 2019-01-14 | End: 2019-01-14 | Stop reason: HOSPADM

## 2019-01-14 RX ORDER — ONDANSETRON 2 MG/ML
4 INJECTION INTRAMUSCULAR; INTRAVENOUS
Status: DISCONTINUED | OUTPATIENT
Start: 2019-01-14 | End: 2019-01-17 | Stop reason: HOSPADM

## 2019-01-14 RX ORDER — DEXTROSE 50 % IN WATER (D50W) INTRAVENOUS SYRINGE
25-50 AS NEEDED
Status: DISCONTINUED | OUTPATIENT
Start: 2019-01-14 | End: 2019-01-14 | Stop reason: HOSPADM

## 2019-01-14 RX ORDER — SODIUM CHLORIDE, SODIUM LACTATE, POTASSIUM CHLORIDE, CALCIUM CHLORIDE 600; 310; 30; 20 MG/100ML; MG/100ML; MG/100ML; MG/100ML
50 INJECTION, SOLUTION INTRAVENOUS CONTINUOUS
Status: DISCONTINUED | OUTPATIENT
Start: 2019-01-14 | End: 2019-01-14 | Stop reason: HOSPADM

## 2019-01-14 RX ORDER — SODIUM CHLORIDE 0.9 % (FLUSH) 0.9 %
5-40 SYRINGE (ML) INJECTION EVERY 8 HOURS
Status: DISCONTINUED | OUTPATIENT
Start: 2019-01-14 | End: 2019-01-17 | Stop reason: HOSPADM

## 2019-01-14 RX ORDER — KETAMINE HYDROCHLORIDE 10 MG/ML
INJECTION, SOLUTION INTRAMUSCULAR; INTRAVENOUS AS NEEDED
Status: DISCONTINUED | OUTPATIENT
Start: 2019-01-14 | End: 2019-01-14 | Stop reason: HOSPADM

## 2019-01-14 RX ORDER — ACETAMINOPHEN 325 MG/1
650 TABLET ORAL
Status: DISCONTINUED | OUTPATIENT
Start: 2019-01-14 | End: 2019-01-17 | Stop reason: HOSPADM

## 2019-01-14 RX ORDER — SODIUM CHLORIDE 0.9 % (FLUSH) 0.9 %
5-40 SYRINGE (ML) INJECTION AS NEEDED
Status: DISCONTINUED | OUTPATIENT
Start: 2019-01-14 | End: 2019-01-14 | Stop reason: HOSPADM

## 2019-01-14 RX ORDER — SODIUM CHLORIDE, SODIUM LACTATE, POTASSIUM CHLORIDE, CALCIUM CHLORIDE 600; 310; 30; 20 MG/100ML; MG/100ML; MG/100ML; MG/100ML
125 INJECTION, SOLUTION INTRAVENOUS CONTINUOUS
Status: DISCONTINUED | OUTPATIENT
Start: 2019-01-14 | End: 2019-01-17

## 2019-01-14 RX ORDER — CEFAZOLIN SODIUM 2 G/50ML
2 SOLUTION INTRAVENOUS ONCE
Status: COMPLETED | OUTPATIENT
Start: 2019-01-14 | End: 2019-01-14

## 2019-01-14 RX ORDER — CEFAZOLIN SODIUM 2 G/50ML
2 SOLUTION INTRAVENOUS EVERY 8 HOURS
Status: COMPLETED | OUTPATIENT
Start: 2019-01-14 | End: 2019-01-15

## 2019-01-14 RX ORDER — DIPHENHYDRAMINE HCL 25 MG
25 CAPSULE ORAL
Status: DISCONTINUED | OUTPATIENT
Start: 2019-01-14 | End: 2019-01-17 | Stop reason: HOSPADM

## 2019-01-14 RX ORDER — NALOXONE HYDROCHLORIDE 0.4 MG/ML
0.1 INJECTION, SOLUTION INTRAMUSCULAR; INTRAVENOUS; SUBCUTANEOUS
Status: DISCONTINUED | OUTPATIENT
Start: 2019-01-14 | End: 2019-01-14 | Stop reason: HOSPADM

## 2019-01-14 RX ORDER — ACETAMINOPHEN 10 MG/ML
1000 INJECTION, SOLUTION INTRAVENOUS ONCE
Status: COMPLETED | OUTPATIENT
Start: 2019-01-14 | End: 2019-01-14

## 2019-01-14 RX ORDER — SODIUM CHLORIDE, SODIUM LACTATE, POTASSIUM CHLORIDE, CALCIUM CHLORIDE 600; 310; 30; 20 MG/100ML; MG/100ML; MG/100ML; MG/100ML
75 INJECTION, SOLUTION INTRAVENOUS CONTINUOUS
Status: DISPENSED | OUTPATIENT
Start: 2019-01-14 | End: 2019-01-15

## 2019-01-14 RX ORDER — KETOROLAC TROMETHAMINE 30 MG/ML
15 INJECTION, SOLUTION INTRAMUSCULAR; INTRAVENOUS
Status: DISPENSED | OUTPATIENT
Start: 2019-01-14 | End: 2019-01-15

## 2019-01-14 RX ORDER — PROPOFOL 10 MG/ML
INJECTION, EMULSION INTRAVENOUS AS NEEDED
Status: DISCONTINUED | OUTPATIENT
Start: 2019-01-14 | End: 2019-01-14 | Stop reason: HOSPADM

## 2019-01-14 RX ADMIN — LIDOCAINE HYDROCHLORIDE 60 MG: 20 INJECTION, SOLUTION EPIDURAL; INFILTRATION; INTRACAUDAL; PERINEURAL at 13:41

## 2019-01-14 RX ADMIN — CARBIDOPA AND LEVODOPA 1.5 TABLET: 25; 100 TABLET ORAL at 12:00

## 2019-01-14 RX ADMIN — ENTACAPONE 200 MG: 200 TABLET, FILM COATED ORAL at 17:35

## 2019-01-14 RX ADMIN — SERTRALINE HYDROCHLORIDE 50 MG: 50 TABLET ORAL at 09:01

## 2019-01-14 RX ADMIN — CARBIDOPA AND LEVODOPA 0.5 TABLET: 25; 100 TABLET ORAL at 17:36

## 2019-01-14 RX ADMIN — CEFAZOLIN SODIUM 2 G: 2 SOLUTION INTRAVENOUS at 20:58

## 2019-01-14 RX ADMIN — CEFAZOLIN SODIUM 2 G: 2 SOLUTION INTRAVENOUS at 13:34

## 2019-01-14 RX ADMIN — Medication 10 ML: at 21:07

## 2019-01-14 RX ADMIN — CARBIDOPA AND LEVODOPA 1.5 TABLET: 25; 100 TABLET ORAL at 20:58

## 2019-01-14 RX ADMIN — KETAMINE HYDROCHLORIDE 30 MG: 10 INJECTION, SOLUTION INTRAMUSCULAR; INTRAVENOUS at 13:57

## 2019-01-14 RX ADMIN — RANITIDINE 150 MG: 150 TABLET ORAL at 20:58

## 2019-01-14 RX ADMIN — CYCLOSPORINE 1 DROP: 0.5 EMULSION OPHTHALMIC at 20:58

## 2019-01-14 RX ADMIN — RANITIDINE 150 MG: 150 TABLET ORAL at 09:01

## 2019-01-14 RX ADMIN — SODIUM CHLORIDE, SODIUM LACTATE, POTASSIUM CHLORIDE, AND CALCIUM CHLORIDE 125 ML/HR: 600; 310; 30; 20 INJECTION, SOLUTION INTRAVENOUS at 12:59

## 2019-01-14 RX ADMIN — CARBIDOPA AND LEVODOPA 0.5 TABLET: 25; 100 TABLET ORAL at 12:00

## 2019-01-14 RX ADMIN — PROPOFOL 120 MG: 10 INJECTION, EMULSION INTRAVENOUS at 13:41

## 2019-01-14 RX ADMIN — MIRABEGRON 50 MG: 25 TABLET, FILM COATED, EXTENDED RELEASE ORAL at 09:02

## 2019-01-14 RX ADMIN — ONDANSETRON 4 MG: 2 INJECTION INTRAMUSCULAR; INTRAVENOUS at 14:32

## 2019-01-14 RX ADMIN — ENTACAPONE 200 MG: 200 TABLET, FILM COATED ORAL at 09:02

## 2019-01-14 RX ADMIN — CARBIDOPA AND LEVODOPA 1.5 TABLET: 25; 100 TABLET ORAL at 08:58

## 2019-01-14 RX ADMIN — CARBIDOPA AND LEVODOPA 0.5 TABLET: 25; 100 TABLET ORAL at 21:07

## 2019-01-14 RX ADMIN — ENTACAPONE 200 MG: 200 TABLET, FILM COATED ORAL at 20:57

## 2019-01-14 RX ADMIN — CARBIDOPA AND LEVODOPA 1.5 TABLET: 25; 100 TABLET ORAL at 17:35

## 2019-01-14 RX ADMIN — RASAGILINE 1 MG: 1 TABLET ORAL at 21:06

## 2019-01-14 RX ADMIN — ACETAMINOPHEN 1000 MG: 10 INJECTION, SOLUTION INTRAVENOUS at 15:48

## 2019-01-14 RX ADMIN — PANTOPRAZOLE SODIUM 40 MG: 40 TABLET, DELAYED RELEASE ORAL at 09:00

## 2019-01-14 RX ADMIN — SODIUM CHLORIDE, SODIUM LACTATE, POTASSIUM CHLORIDE, AND CALCIUM CHLORIDE 125 ML/HR: 600; 310; 30; 20 INJECTION, SOLUTION INTRAVENOUS at 17:26

## 2019-01-14 RX ADMIN — CARBIDOPA AND LEVODOPA 0.5 TABLET: 25; 100 TABLET ORAL at 08:58

## 2019-01-14 NOTE — PROGRESS NOTES
Reason for Readmission:     Fall with fracture RRAT Score and Risk Level:     Low; 12 
  
Level of Readmission:   1 Care Conference scheduled:   TBD Resources/supports as identified by patient/family:    
    
Top Challenges facing patient (as identified by patient/family and CM): Finances/Medication cost?      
Transportation Support system or lack thereof? Living arrangements? Self-care/ADLs/Cognition? Current Advanced Directive/Advance Care Plan: On file Plan for utilizing home health: TBD Likelihood of additional readmission:  Low Transition of Care Plan:    Based on readmission, the patient's previous Plan of Care 
 has been evaluated and/or modified. The current Transition of Care Plan is:  [facility] vs SNF Noted pt with planned surgical intervention today. Provider, please order therapy services when appropriate following surgical intervention to assist with identifying an appropriate transition of care. CM contacted pt's daughter, Jenna Riddle ([phone]), to discuss transition of care. Pt's daughter has indicated pt was at [place] [address] for rehab when he fell and injured himself resulting in a fracture. Pt's daughter has indicated she is not pleased with [facility] at this time, and does not wish to have clinical information sent to [facility] at this time. CM continuing to follow. Care Management Interventions PCP Verified by CM: Yes Transition of Care Consult (CM Consult): Discharge Planning, SNF Current Support Network: Family Lives [facility](was at [place] [address] for rehab) Confirm Follow Up Transport: Family Plan discussed with Pt/Family/Caregiver: Yes Freedom of Choice Offered: Yes Discharge Location Discharge Placement: Skilled nursing facility

## 2019-01-14 NOTE — PROGRESS NOTES
Pt transferred to room 328. Pt stable. RN Shaheen Finney at bedside. Dressing to right hip dry and intact with some breakthrough drainage. Ice pack to right hip.  
 
 
 01/14/19 1702 Vital Signs Temp 98.4 °F (36.9 °C) Temp Source Oral  
Pulse (Heart Rate) 66 Resp Rate 16  
O2 Sat (%) 100 % Level of Consciousness Alert /72 MAP (Calculated) 104 MEWS Score 1

## 2019-01-14 NOTE — PROGRESS NOTES
Shift summary: Pt remains A+Ox1. Medicated for pain x 1. Rested quietly in bed, bed alarm activated. CHG bath and linen change complete. Daughter and patient signed consent for procedure tomorrow. Per DAVID Moon RN, pt is cleared to have 8AM & 12PM meds with sips of water. Patient Vitals for the past 12 hrs: 
 Temp Pulse Resp BP SpO2  
01/14/19 0403 97.9 °F (36.6 °C) 66 16 146/85 97 % 01/13/19 2317 98.2 °F (36.8 °C) 69 18 147/65 95 % 01/13/19 2018 97.3 °F (36.3 °C) 69 20 155/72 100 %

## 2019-01-14 NOTE — ANESTHESIA POSTPROCEDURE EVALUATION
Procedure(s): HIP CLOSED REDUCTION INTERNAL FIXATION, RIGHT HIP, HANA TABLE, C-ARM. Anesthesia Post Evaluation Pain management: adequate Airway patency: patent Anesthetic complications: no 
Cardiovascular status: acceptable Respiratory status: acceptable Hydration status: acceptable Post anesthesia nausea and vomiting:  none Visit Vitals /73 Pulse 65 Temp 36.8 °C (98.3 °F) Resp 16 Ht 5' 8\" (1.727 m) Wt 73.6 kg (162 lb 3.2 oz) SpO2 100% BMI 24.66 kg/m²

## 2019-01-14 NOTE — ROUTINE PROCESS
Bedside and Verbal shift change report given to ANDREW Deshpande RN (oncoming nurse) by Florence Kawasaki, RN (offgoing nurse). Report included the following information SBAR, Kardex, Intake/Output and MAR.

## 2019-01-14 NOTE — OP NOTES
[facility]  OPERATIVE REPORT     Kristel Marrero.  MR#: [id number]  : 1935  ACCOUNT #: [de-identified]   DATE OF SERVICE: 19     PREOPERATIVE DIAGNOSIS:  Rt femoral neck fracture.     POSTOPERATIVE DIAGNOSIS:  Rt femoral neck fracture.     PROCEDURE PERFORMED:  Multiple cannulated screws, Rt hip.     SURGEON:  Keegan Bonilla MD     ASSISTANT:  JEFF Goins      ANESTHESIA:  General.     ANESTHESIOLOGISTFeltmeena Jimenez MD     ESTIMATED BLOOD LOSS:  Less than 25 mL     COMPLICATIONS:  None.     SPECIMENS REMOVED:  None.     IMPLANTS:  Cannulated screws x 4.      OPERATIVE PROCEDURE IN DETAIL:  The patient was taken to the operating room. After satisfactory general anesthesia established, he was moved onto the Tynan table. A well leg goncalves was used on the Leftt. Right leg was placed in the traction boot. Image intensification was used to confirm the initial position of the fracture in the AP and lateral positions. The leg was prepped with ChloraPrep and draped in a sterile fashion.     The line of the screw path was traced on the skin using a marking pen with the image intensification in the AP and lateral positions. At the intersection at this point, a guidewire was placed through the skin down to bone and across the hip fracture. Four screws were  separately placed. They were measured for depth, they were reamed and appropriate size screws were placed. The 2 superior screws used to use a long thread, the more inferior use short threads. All 4 screws had a good bite. Image intensification confirmed appropriate position of the screws and guidewires were removed. The wounds were closed with buried 3-0 Monocryl sutures followed by Steri strips.   The patient tolerated the procedure well, was awakened from anesthesia and transferred to his recovery bed and taken to recovery room in stable condition.        Evaristo Victor Purposeful rounding completed:    Side rails up x 2:  YES  Bed low and wheels and locked: YES  Call bell in reach: YES  Comfort addressed: YES     Toileting needs addressed: YES  Plan of care reviewed/updated with patient and or family members: YES  IV site assessed: YES  Pain assessed and addressed: resting comfortably with eyes closed and in no distress.

## 2019-01-14 NOTE — PROGRESS NOTES
TRANSFER - OUT REPORT: 
 
Verbal report given to Natasha IBRAHIM RN(name) on Eloisa Miguel  being transferred to [facility] for routine progression of care Report consisted of patients Situation, Background, Assessment and  
Recommendations(SBAR). Information from the following report(s) SBAR, Kardex, Intake/Output and MAR was reviewed with the receiving nurse. Lines:  
Peripheral IV 01/13/19 Anterior; Inferior; Lower;Proximal;Right Arm (Active) Site Assessment Clean, dry, & intact 1/13/2019  8:00 PM  
Phlebitis Assessment 0 1/13/2019  8:00 PM  
Infiltration Assessment 0 1/13/2019  8:00 PM  
Dressing Status Clean, dry, & intact 1/13/2019  8:00 PM  
Dressing Type Tape;Transparent;Elastic bandage 1/13/2019  8:00 PM  
Hub Color/Line Status Pink;Flushed;Capped 1/13/2019  8:00 PM  
Action Taken Open ports on tubing capped 1/13/2019  4:30 PM  
Alcohol Cap Used Yes 1/13/2019  8:00 PM  
  
 
Opportunity for questions and clarification was provided. Patient transported with: 
 NONO

## 2019-01-14 NOTE — ROUTINE PROCESS
TRANSFER - IN REPORT: 
 
Verbal report received from [facility] on Emilyriann Jg  being received from PACU for routine post - op. Report consisted of patients Situation, Background, Assessment and  
Recommendations(SBAR). Information from the following report(s) SBAR, Kardex, OR Summary, Intake/Output and MAR was reviewed with the receiving nurse. Opportunity for questions and clarification was provided. Assessment to be completed upon patients arrival to unit and care assumed.

## 2019-01-14 NOTE — NURSE NAVIGATOR
Telephone call to MACKENZIE Haley RN . OK to give am meds with sip of water. Also make sure 8 am and 12 noon dose of parkinson meds are given.

## 2019-01-14 NOTE — BRIEF OP NOTE
BRIEF OPERATIVE NOTE Date of Procedure: 1/14/2019 Preoperative Diagnosis: fracture right hip Postoperative Diagnosis: fracture right hip Procedure(s): HIP CLOSED REDUCTION INTERNAL FIXATION, RIGHT HIP, HANA TABLE, C-ARM Surgeon(s) and Role: Zamzam Gibson MD - Primary Surgical Assistant: Juany Crabtree PA-C Surgical Staff: 
Circ-1: Jacek Biggs RN Physician Assistant: Tara Sidhu PA-C Scrub Tech-1: Hebert Wells Scrub RN-1: Ferdinand Kapadia RN Event Time In Time Out Incision Start 01/14/2019 1416 Incision Close 01/14/2019 1501 Anesthesia: General  
Estimated Blood Loss: Less than 10mL Specimens: * No specimens in log * Findings: Right nondisplaced femoral neck fracture Complications: None Implants:  
Implant Name Type Inv. Item Serial No.  Lot No. LRB No. Used Action SCR BNE RAJESH 16 THRD 7.3X95 -- SS - [id number]  SCR BNE RAJESH 16 THRD 7.3X95 --   [employer] [place] [id number] Right 2 Implanted SCR BNE RAJESH 16 THRD 7.3X95 -- SS - [id number]  SCR BNE RAJESH 16 THRD 7.3X95 -- SS  [employer] [place] [id number] Right 2 Implanted

## 2019-01-14 NOTE — PROGRESS NOTES
1710 - Patient arrives to unit at this time. Dual skin assessment performed at this time, all WDL. Fall risk armband present upon arrival.  Admission completed at this time. Patient is A/O x 1. IV to left FA and right FA intact and patent and covered in ACE wrap. Patient confused, bed alarm on. .  SCD applied to BLE. Mepilex dressing to right hip CDI. Patient denies numbness/tingling. Pedal and radial pulses palpable. Lungs clear. Bowel sounds active. Pain 1/10. Patient was oriented to the room to include use of call bell, meal ordering, and use of incentive spirometer patient able to get up to 4000 on IS. Patient was given explanation of \" up for dinner\" program and has verbalized understanding. Phone and call bell left within reach. Plan of care for the day addressed with patient. Educated on pain medication availability and possible side effects. 1815-Patient brief changed. Barrier cream applied to reddened scrotal area. [address] changed. Patient denies any pain at this time. Bed in lowest position, bed alarm on, family member at bedside, callbell within reach. 1829-Spoke with Dr. Yogi Daniel to confirm if he wanted the patient to have his daily dose of Lovenox. Per Dr. Yogi Daniel the patient is ok to restart the lovenox. 1930-Shift Summary: Patient's pain well controlled this shift. IV remains intact. Dressing to right hip has two small spots of breakthrough drainage. SCD compression device in place.

## 2019-01-14 NOTE — PROGRESS NOTES
Rec'd pt. Assessment complete. Pt. Alert to self only. Will monitor closely. Pt. Has no complaints of pain at this time.

## 2019-01-14 NOTE — PROGRESS NOTES
Progress Note Patient: Jillene Schirmer MRN: [id number]  SSN: [id number] YOB: 1935  Age: 82 y.o. Sex: male Patient Vitals for the past 12 hrs: 
 BP Temp Pulse Resp SpO2  
01/14/19 0403 146/85 97.9 °F (36.6 °C) 66 16 97 % 01/13/19 2317 147/65 98.2 °F (36.8 °C) 69 18 95 % 01/13/19 2018 155/72 97.3 °F (36.3 °C) 69 20 100 % Recent Labs  
  01/12/19 
0715 HGB 14.1 HCT 43.2 INR 1.1   
K 3.9  CO2 27 BUN 15  
CREA 0.76 * Pt resting in bed. Some confusion Assessment:  
 
X rays shows rt hip fx min displaced Plan: 1. OR today for FELI Cardona

## 2019-01-14 NOTE — H&P
[address] History and Physical Exam 
 
Patient: Hollie Aschoff MRN: [id number]  SSN: [id number] YOB: 1935  Age: 82 y.o. Sex: male Subjective: Chief Complaint: right hip pain History of Present Illness:  Patient complains of right hip pain and difficulty ambulating. Patient fell on Saturday at rehab and suffered a nondisplaced right femoral neck fracture. Past Medical History:  
Diagnosis Date  At risk for falls  Cancer [facility])   
 prostate [address]  GERD (gastroesophageal reflux disease)  Parkinson disease ([facility] [address]  Psychiatric disorder   
 depression Past Surgical History:  
Procedure Laterality Date  HX CATARACT REMOVAL Bilateral   
 HX HERNIA REPAIR    
 x2  
 HX LAP CHOLECYSTECTOMY  HX VASECTOMY Social History Occupational History  Not on file Tobacco Use  Smoking status: Never Smoker  Smokeless tobacco: Never Used Substance and Sexual Activity  Alcohol use: No  
  Frequency: Never  Drug use: No  
 Sexual activity: No  
 
Prior to Admission medications Medication Sig Start Date End Date Taking? Authorizing Provider  
aspirin delayed-release 325 mg tablet Take 1 Tab by mouth two (2) times a day. 12/19/18   Yolanda Ramirez PA-C  
oxyCODONE-acetaminophen (PERCOCET) 5-325 mg per tablet Take 1-2 Tabs by mouth every four (4) hours as needed. Max Daily Amount: 12 Tabs. 12/19/18   Yolanda Ramirez PA-C  
pantoprazole (PROTONIX) 40 mg tablet Take 40 mg by mouth daily. Provider, Historical  
rasagiline (AZILECT) 1 mg tablet Take 1 mg by mouth nightly. Provider, Historical  
metaxalone (SKELAXIN) 400 mg tablet Take 1 Tab by mouth three (3) times daily as needed. 12/10/18   JEFF Redding  
carbidopa-levodopa (SINEMET)  mg per tablet Take 0.5 Tabs by mouth four (4) times daily.     Other, MD Cha  
carbidopa-levodopa-entacapone (STALEVO) 37.5-150-200 mg tablet Take 1 Tab by mouth Before breakfast, lunch, dinner and at bedtime. Cha Paula MD  
cycloSPORINE (RESTASIS) 0.05 % dpet Administer 1 Drop to both eyes every twelve (12) hours. Cha Paula MD  
mirabegron ER (MYRBETRIQ) 50 mg ER tablet Take 50 mg by mouth daily. Cha Paula MD  
raNITIdine hcl 150 mg capsule Take 150 mg by mouth two (2) times a day. Cha Paula MD  
sertraline (ZOLOFT) 50 mg tablet Take 50 mg by mouth daily. Cha Paula MD  
 
 
Allergies: Allergies Allergen Reactions  Other Medication Other (comments) Daughter asking for no narcotics due to pt's reaction to percocet and states pt has parkinson's.  Percocet [Oxycodone-Acetaminophen] Other (comments) \"confusion\" per daughter Review of Systems: A comprehensive review of systems was negative except for that written in the History of Present Illness. Objective:   
  
Physical Exam: 
HEENT: Normocephalic, atraumatic Lungs:  Clear to auscultation Heart:   Regular rate and rhythm Abdomen: Soft Extremities:  Pain with range of motion of the right hip Neurological: Grossly neurovascularly intact Assessment:   
 
Right hip nondisplaced femoral neck fracture. Plan:    
Due to the nature of the fracture, the patient became a candidate for surgical intervention. Proceed with scheduled right hip femoral neck fracture closed reduction internal fixation with cannulated screws. The various methods of treatment have been discussed with the patient and family. After consideration of risks, benefits, and other options for treatment, the patient has consented to surgical interventions. Questions were answered and preoperative teaching was done by Dr. Navneet Perkins. Signed By: Kimberly Staton PA-C January 14, 2019

## 2019-01-14 NOTE — PROGRESS NOTES
Problem: Falls - Risk of 
Goal: *Absence of Falls Document Yomaira Paulson Fall Risk and appropriate interventions in the flowsheet. Outcome: Progressing Towards Goal 
Fall Risk Interventions: 
Mobility Interventions: Assess mobility with egress test, Patient to call before getting OOB Mentation Interventions: Door open when patient unattended, More frequent rounding, Reorient patient, Room close to nurse's station, Toileting rounds, Update white board Medication Interventions: Patient to call before getting OOB, Bed/chair exit alarm Elimination Interventions: Bed/chair exit alarm, Patient to call for help with toileting needs, Toileting schedule/hourly rounds, Urinal in reach History of Falls Interventions: Bed/chair exit alarm, Room close to nurse's station, Door open when patient unattended

## 2019-01-14 NOTE — PERIOP NOTES
TRANSFER - OUT REPORT: 
 
Verbal report given to LOPEZ Gale RN (name) on Nery Cue  being transferred to  (unit) for routine post - op Report consisted of patients Situation, Background, Assessment and  
Recommendations(SBAR). Information from the following report(s) SBAR, Kardex, OR Summary, Intake/Output and MAR was reviewed with the receiving nurse. Lines:  
Peripheral IV 01/13/19 Anterior; Inferior; Lower;Proximal;Right Arm (Active) Site Assessment Clean, dry, & intact 1/14/2019  5:41 PM  
Phlebitis Assessment 0 1/14/2019  5:41 PM  
Infiltration Assessment 0 1/14/2019  5:41 PM  
Dressing Status Clean, dry, & intact 1/14/2019  5:41 PM  
Dressing Type Tape;Transparent 1/14/2019  5:41 PM  
Hub Color/Line Status Pink 1/14/2019  5:41 PM  
Action Taken Open ports on tubing capped 1/14/2019  5:41 PM  
Alcohol Cap Used Yes 1/14/2019  5:41 PM  
   
Peripheral IV 01/14/19 Left Forearm (Active) Site Assessment Clean, dry, & intact 1/14/2019  5:41 PM  
Phlebitis Assessment 0 1/14/2019  5:41 PM  
Infiltration Assessment 0 1/14/2019  5:41 PM  
Dressing Status Clean, dry, & intact 1/14/2019  5:41 PM  
Dressing Type Tape;Transparent 1/14/2019  5:41 PM  
Hub Color/Line Status Green;Capped 1/14/2019  5:41 PM  
Action Taken Open ports on tubing capped 1/14/2019  5:41 PM  
Alcohol Cap Used Yes 1/14/2019  5:41 PM  
  
 
Opportunity for questions and clarification was provided. Patient transported with: 
 O2 @ 2 liters Registered Nurse

## 2019-01-14 NOTE — PROGRESS NOTES
Hospitalist Progress Note Patient: Rosalina Bowen MRN: [id number]  CSN: [id number] YOB: 1935  Age: 82 y.o. Sex: male DOA: 1/12/2019 LOS:  LOS: 2 days Assessment/Plan Patient Active Problem List  
Diagnosis Code  Parkinson disease ([facility] [address] G20  
 Closed right hip fracture ([facility] [address] S72.001A  GERD (gastroesophageal reflux disease) K21.9  Falls W19. Blessing Celestin  
  
 
81 yo male admitted for fall and right hip fracture 
 
pleasantly demented, no acute events overnight. Left hip fracture - orthopedics consulted by ER, plan for surgical intervention by orthopedics today. EKG and CXR reviewed, He had surgery for left hip in December.  
  
parkinsons disease - continue with home medications.  
  
GERD - continue with PPI. 
  
Dementia 
  
Fall precautions  
  
DVT prophylaxis, Disposition : 2 days Review of systems General: No fevers or chills. Cardiovascular: No chest pain or pressure. No palpitations. Pulmonary: No shortness of breath. Gastrointestinal: No nausea, vomiting. Physical Exam: 
General: Awake, cooperative, no acute distress   
HEENT: NC, Atraumatic. PERRLA, anicteric sclerae. Lungs: CTA Bilaterally. No Wheezing/Rhonchi/Rales. Heart:  S1 S2,  No murmur, No Rubs, No Gallops Abdomen: Soft, Non distended, Non tender.  +Bowel sounds, Extremities: No c/c/e Psych:   Not anxious or agitated. Neurologic:  No acute neurological deficit. Vital signs/Intake and Output: 
Visit Vitals /73 (BP 1 Location: Right arm, BP Patient Position: Supine; At rest) Pulse 77 Temp 98 °F (36.7 °C) Resp 18 Ht 5' 8\" (1.727 m) Wt 73.6 kg (162 lb 3.2 oz) SpO2 97% BMI 24.66 kg/m² Current Shift:  No intake/output data recorded. Last three shifts:  01/12 1901 - 01/14 0700 In: -  
Out: 208 [YIQPN:958] Labs: Results:  
   
Chemistry Recent Labs  
  01/12/19 
0715 *   
K 3.9  CO2 27 BUN 15  
 CREA 0.76 CA 8.1* AGAP 7  
BUCR 20 CBC w/Diff Recent Labs  
  01/12/19 
0715 WBC 6.5  
RBC 4.70 HGB 14.1 HCT 43.2  GRANS 73 LYMPH 12* EOS 5 Cardiac Enzymes No results for input(s): CPK, CKND1, RUBIA in the last 72 hours. No lab exists for component: Brunokennedi Sotoa Coagulation Recent Labs  
  01/12/19 
0715 PTP 13.7 INR 1.1 APTT 30.4 Lipid Panel No results found for: CHOL, CHOLPOCT, CHOLX, CHLST, CHOLV, 186954, HDL, LDL, LDLC, DLDLP, 505449, VLDLC, VLDL, TGLX, TRIGL, TRIGP, TGLPOCT, CHHD, CHHDX  
BNP No results for input(s): BNPP in the last 72 hours. Liver Enzymes No results for input(s): TP, ALB, TBIL, AP, SGOT, GPT in the last 72 hours. No lab exists for component: DBIL Thyroid Studies No results found for: T4, T3U, TSH, TSHEXT, TSHEXT Procedures/imaging: see electronic medical records for all procedures/Xrays and details which were not copied into this note but were reviewed prior to creation of Plan

## 2019-01-14 NOTE — ANESTHESIA PREPROCEDURE EVALUATION
Anesthetic History No history of anesthetic complications Review of Systems / Medical History Patient summary reviewed, nursing notes reviewed and pertinent labs reviewed Pulmonary Within defined limits Neuro/Psych Psychiatric history and dementia Comments: Parkinson's Cardiovascular Exercise tolerance: >4 METS 
  
GI/Hepatic/Renal 
  
GERD Endo/Other Within defined limits Other Findings Physical Exam 
 
Airway Mallampati: III 
TM Distance: 4 - 6 cm Neck ROM: decreased range of motion Mouth opening: Diminished (comment) Cardiovascular Regular rate and rhythm,  S1 and S2 normal,  no murmur, click, rub, or gallop Rhythm: regular Rate: normal 
 
 
 
 Dental 
 
Dentition: Caps/crowns Pulmonary Breath sounds clear to auscultation Abdominal 
GI exam deferred Other Findings Anesthetic Plan ASA: 3 Anesthesia type: general 
 
 
 
 
Induction: Intravenous Anesthetic plan and risks discussed with: Patient

## 2019-01-15 LAB
ANION GAP SERPL CALC-SCNC: 6 MMOL/L (ref 3–18)
BUN SERPL-MCNC: 15 MG/DL (ref 7–18)
BUN/CREAT SERPL: 18 (ref 12–20)
CALCIUM SERPL-MCNC: 8.2 MG/DL (ref 8.5–10.1)
CHLORIDE SERPL-SCNC: 109 MMOL/L (ref 100–108)
CO2 SERPL-SCNC: 29 MMOL/L (ref 21–32)
CREAT SERPL-MCNC: 0.82 MG/DL (ref 0.6–1.3)
ERYTHROCYTE [DISTWIDTH] IN BLOOD BY AUTOMATED COUNT: 12.9 % (ref 11.6–14.5)
GLUCOSE SERPL-MCNC: 105 MG/DL (ref 74–99)
HCT VFR BLD AUTO: 41 % (ref 36–48)
HGB BLD-MCNC: 13.3 G/DL (ref 13–16)
MCH RBC QN AUTO: 30.4 PG (ref 24–34)
MCHC RBC AUTO-ENTMCNC: 32.4 G/DL (ref 31–37)
MCV RBC AUTO: 93.6 FL (ref 74–97)
PLATELET # BLD AUTO: 178 K/UL (ref 135–420)
PMV BLD AUTO: 10.2 FL (ref 9.2–11.8)
POTASSIUM SERPL-SCNC: 3.8 MMOL/L (ref 3.5–5.5)
RBC # BLD AUTO: 4.38 M/UL (ref 4.7–5.5)
SODIUM SERPL-SCNC: 144 MMOL/L (ref 136–145)
WBC # BLD AUTO: 7.3 K/UL (ref 4.6–13.2)

## 2019-01-15 PROCEDURE — 97535 SELF CARE MNGMENT TRAINING: CPT

## 2019-01-15 PROCEDURE — 74011250636 HC RX REV CODE- 250/636: Performed by: HOSPITALIST

## 2019-01-15 PROCEDURE — 74011250637 HC RX REV CODE- 250/637: Performed by: HOSPITALIST

## 2019-01-15 PROCEDURE — 80048 BASIC METABOLIC PNL TOTAL CA: CPT

## 2019-01-15 PROCEDURE — 74011250636 HC RX REV CODE- 250/636: Performed by: PHYSICIAN ASSISTANT

## 2019-01-15 PROCEDURE — 65270000029 HC RM PRIVATE

## 2019-01-15 PROCEDURE — 97530 THERAPEUTIC ACTIVITIES: CPT

## 2019-01-15 PROCEDURE — 85027 COMPLETE CBC AUTOMATED: CPT

## 2019-01-15 PROCEDURE — 74011250637 HC RX REV CODE- 250/637: Performed by: PHYSICIAN ASSISTANT

## 2019-01-15 PROCEDURE — 97167 OT EVAL HIGH COMPLEX 60 MIN: CPT

## 2019-01-15 PROCEDURE — 36415 COLL VENOUS BLD VENIPUNCTURE: CPT

## 2019-01-15 PROCEDURE — 74011000250 HC RX REV CODE- 250: Performed by: HOSPITALIST

## 2019-01-15 PROCEDURE — 77010033678 HC OXYGEN DAILY

## 2019-01-15 PROCEDURE — 97163 PT EVAL HIGH COMPLEX 45 MIN: CPT

## 2019-01-15 RX ADMIN — RANITIDINE 150 MG: 150 TABLET ORAL at 21:15

## 2019-01-15 RX ADMIN — ENTACAPONE 200 MG: 200 TABLET, FILM COATED ORAL at 21:15

## 2019-01-15 RX ADMIN — KETOROLAC TROMETHAMINE 15 MG: 30 INJECTION, SOLUTION INTRAMUSCULAR at 10:20

## 2019-01-15 RX ADMIN — RASAGILINE 1 MG: 1 TABLET ORAL at 21:17

## 2019-01-15 RX ADMIN — SERTRALINE HYDROCHLORIDE 50 MG: 50 TABLET ORAL at 08:08

## 2019-01-15 RX ADMIN — CEFAZOLIN SODIUM 2 G: 2 SOLUTION INTRAVENOUS at 05:24

## 2019-01-15 RX ADMIN — PANTOPRAZOLE SODIUM 40 MG: 40 TABLET, DELAYED RELEASE ORAL at 08:07

## 2019-01-15 RX ADMIN — CARBIDOPA AND LEVODOPA 1.5 TABLET: 25; 100 TABLET ORAL at 16:07

## 2019-01-15 RX ADMIN — Medication 10 ML: at 05:24

## 2019-01-15 RX ADMIN — CARBIDOPA AND LEVODOPA 1.5 TABLET: 25; 100 TABLET ORAL at 08:08

## 2019-01-15 RX ADMIN — CARBIDOPA AND LEVODOPA 0.5 TABLET: 25; 100 TABLET ORAL at 21:15

## 2019-01-15 RX ADMIN — CARBIDOPA AND LEVODOPA 0.5 TABLET: 25; 100 TABLET ORAL at 12:04

## 2019-01-15 RX ADMIN — DIPHENHYDRAMINE HYDROCHLORIDE 25 MG: 25 CAPSULE ORAL at 10:12

## 2019-01-15 RX ADMIN — CARBIDOPA AND LEVODOPA 0.5 TABLET: 25; 100 TABLET ORAL at 08:06

## 2019-01-15 RX ADMIN — MIRABEGRON 50 MG: 25 TABLET, FILM COATED, EXTENDED RELEASE ORAL at 08:07

## 2019-01-15 RX ADMIN — ENTACAPONE 200 MG: 200 TABLET, FILM COATED ORAL at 16:07

## 2019-01-15 RX ADMIN — CARBIDOPA AND LEVODOPA 1.5 TABLET: 25; 100 TABLET ORAL at 21:16

## 2019-01-15 RX ADMIN — ACETAMINOPHEN 650 MG: 325 TABLET ORAL at 21:22

## 2019-01-15 RX ADMIN — CYCLOSPORINE 1 DROP: 0.5 EMULSION OPHTHALMIC at 09:00

## 2019-01-15 RX ADMIN — CYCLOSPORINE 1 DROP: 0.5 EMULSION OPHTHALMIC at 21:16

## 2019-01-15 RX ADMIN — ENTACAPONE 200 MG: 200 TABLET, FILM COATED ORAL at 08:05

## 2019-01-15 RX ADMIN — CARBIDOPA AND LEVODOPA 1.5 TABLET: 25; 100 TABLET ORAL at 12:04

## 2019-01-15 RX ADMIN — SODIUM CHLORIDE, SODIUM LACTATE, POTASSIUM CHLORIDE, AND CALCIUM CHLORIDE 75 ML/HR: 600; 310; 30; 20 INJECTION, SOLUTION INTRAVENOUS at 05:24

## 2019-01-15 RX ADMIN — CARBIDOPA AND LEVODOPA 0.5 TABLET: 25; 100 TABLET ORAL at 16:07

## 2019-01-15 RX ADMIN — ENOXAPARIN SODIUM 40 MG: 40 INJECTION SUBCUTANEOUS at 12:05

## 2019-01-15 RX ADMIN — ENTACAPONE 200 MG: 200 TABLET, FILM COATED ORAL at 12:05

## 2019-01-15 RX ADMIN — ACETAMINOPHEN 650 MG: 325 TABLET ORAL at 08:37

## 2019-01-15 RX ADMIN — RANITIDINE 150 MG: 150 TABLET ORAL at 08:05

## 2019-01-15 NOTE — PROGRESS NOTES
Shift Summary- Pt confused throughout the night. Took off IV wrappings and attempted to get out of bed. Dressing to left hip with old drainage. Pt refuses to wear gown and bed alarm remained on throughout the night. No cues or complaints of any pain. CHG wipe down completed. Patient Vitals for the past 12 hrs: 
 Temp Pulse Resp BP SpO2  
01/15/19 0322 99.1 °F (37.3 °C) 95 16 (!) 172/97 92 % 01/14/19 2204 98 °F (36.7 °C) 81 16 154/71 99 % 01/14/19 1958 98.5 °F (36.9 °C) 77 17 123/64 98 % 01/14/19 1852 99.2 °F (37.3 °C) 82 17 115/71 98 % 01/14/19 1802 98.6 °F (37 °C) 70 16 155/77 100 %

## 2019-01-15 NOTE — PROGRESS NOTES
1/15/2019 PT note: consult received and chart reviewed. Evaluation attempted. Nurse and CNA with pt, completing hygiene care. Pt confused, pulling at bed items. Nurse Martine Joaquin advised against PT at this time due to pt too confused. Will f/u at later time as pt schedule allows for PT evaluation. Thank you.   
Hari Alvarenga, PT

## 2019-01-15 NOTE — PROGRESS NOTES
Problem: Falls - Risk of 
Goal: *Absence of Falls Document Orvel Buffy Fall Risk and appropriate interventions in the flowsheet. Outcome: Progressing Towards Goal 
Fall Risk Interventions: 
Mobility Interventions: Bed/chair exit alarm, PT Consult for mobility concerns Mentation Interventions: Bed/chair exit alarm, Door open when patient unattended, More frequent rounding, Reorient patient, Room close to nurse's station Medication Interventions: Bed/chair exit alarm, Evaluate medications/consider consulting pharmacy Elimination Interventions: Bed/chair exit alarm, Call light in reach, Patient to call for help with toileting needs History of Falls Interventions: Bed/chair exit alarm, Door open when patient unattended, Evaluate medications/consider consulting pharmacy, Investigate reason for fall, Room close to nurse's station

## 2019-01-15 NOTE — PROGRESS NOTES
Hospitalist Progress Note Patient: Ermalene Romberg MRN: [id number]  CSN: [id number] YOB: 1935  Age: 82 y.o. Sex: male DOA: 1/12/2019 LOS:  LOS: 3 days Assessment/Plan Patient Active Problem List  
Diagnosis Code  Parkinson disease ([facility] [address] G20  
 Closed right hip fracture ([facility] [address] S72.001A  GERD (gastroesophageal reflux disease) K21.9  Falls W19. Shelia Mendenhall  
  
 
83 yo male admitted for fall and right hip fracture 
 
pleasantly demented, no acute events overnight. Left hip fracture - s/p multiple cannulated screws right hip. PT/OT per orthopedics. parkinsons disease - continue with home sinemet.  
  
GERD - continue with PPI. Dementia 
  
Fall precautions  
  
DVT prophylaxis, Disposition : 2 days Review of systems General: No fevers or chills. Cardiovascular: No chest pain or pressure. No palpitations. Pulmonary: No shortness of breath. Gastrointestinal: No nausea, vomiting. Physical Exam: 
General: Awake, cooperative, no acute distress   
HEENT: NC, Atraumatic. PERRLA, anicteric sclerae. Lungs: CTA Bilaterally. No Wheezing/Rhonchi/Rales. Heart:  S1 S2,  No murmur, No Rubs, No Gallops Abdomen: Soft, Non distended, Non tender.  +Bowel sounds, Extremities: No c/c/e Psych:   Not anxious or agitated. Neurologic:  No acute neurological deficit. Vital signs/Intake and Output: 
Visit Vitals BP (!) 144/97 (BP 1 Location: Right arm) Pulse 78 Temp 99 °F (37.2 °C) Resp 19 Ht 5' 8\" (1.727 m) Wt 76.4 kg (168 lb 6.9 oz) SpO2 98% BMI 25.61 kg/m² Current Shift:  01/15 0701 - 01/15 1900 In: 580 [P.O.:480] Out: - Last three shifts:  01/13 1901 - 01/15 0700 In: 2297.9 [I.V.:2297.9] Out: 205 [Urine:200] Labs: Results:  
   
Chemistry Recent Labs  
  01/15/19 
0315 01/14/19 1948 * 176*  143  
K 3.8 3.9 * 109* CO2 29 29 BUN 15 15 CREA 0.82 0.91  
CA 8.2* 7.8* AGAP 6 5 BUCR 18 16 CBC w/Diff Recent Labs  
  01/15/19 
0315 01/14/19 
1948 WBC 7.3  --   
RBC 4.38*  --   
HGB 13.3 13.2 HCT 41.0 40.8   --   
  
Cardiac Enzymes No results for input(s): CPK, CKND1, RUBIA in the last 72 hours. No lab exists for component: Gaynel Plater Coagulation No results for input(s): PTP, INR, APTT in the last 72 hours. No lab exists for component: INREXT, INREXT Lipid Panel No results found for: CHOL, CHOLPOCT, CHOLX, CHLST, CHOLV, 354700, HDL, LDL, LDLC, DLDLP, 455204, VLDLC, VLDL, TGLX, TRIGL, TRIGP, TGLPOCT, CHHD, CHHDX  
BNP No results for input(s): BNPP in the last 72 hours. Liver Enzymes No results for input(s): TP, ALB, TBIL, AP, SGOT, GPT in the last 72 hours. No lab exists for component: DBIL Thyroid Studies No results found for: T4, T3U, TSH, TSHEXT, TSHEXT Procedures/imaging: see electronic medical records for all procedures/Xrays and details which were not copied into this note but were reviewed prior to creation of Plan

## 2019-01-15 NOTE — PROGRESS NOTES
Problem: Self Care Deficits Care Plan (Adult) Goal: *Acute Goals and Plan of Care (Insert Text) Initial Occupational Therapy Goals (1/15/2019) Within 7 day(s): 1. Patient will perform self-feeding/grooming sitting on EOB with Setup A and S for sit balance. 2. Patient will perform UB dressing with Setup-Min A seated EOB for increased independence with ADLs. 3. Patient will perform LB dressing with Mod-Max A & A/E PRN for increased independence with ADLs while adhering to WB status of RLE. 4. Patient will perform all aspects of toileting with Mod-Max A for increased independence with ADLs. 5. Patient will perform LB ADLs utilizing body mechanics & adaptive strategies with 1 verbal cue for increased safety in ADLs. Occupational Therapy EVALUATION Patient: Bettina Urban (89 y.o. male) Date: 1/15/2019 Primary Diagnosis: Closed right hip fracture ([address] Procedure(s) (LRB): HIP CLOSED REDUCTION INTERNAL FIXATION, RIGHT HIP, HANA TABLE, C-ARM (Right) 1 Day Post-Op Precautions:   Bed Alarm, Fall, PWB(50% WB RLE) ASSESSMENT : 
Based on the objective data described below, the patient presents with decreased functional strength, decreased functional balance, decreased overall activity tolerance limiting independence with ADLs. Pt was supine with HOB elevated; nurse and daughter in room. Pt was seen with PT to maximize performance/safety with evaluation. Pt required fluctuating assistance secondary to confusion/anxiety/irritation. Pt required assistance to transfer from supine to sit EOB. Attempted to perform sit to stand transfer but unable to complete secondary to pt being too confused and not wanting assistance. Pt required fluctuating assistance with sit balance from S to Max A as pt has tendency to want to lean toward L to relieve pressure off of R hip.  Pt required Total A to transfer back to supine secondary to inability to follow command to transfer back to supine. Pt required Total A to change out briefs secondary to be incontinent. Pt was left with therapist to fill-in as sitter for safety and bed alarm set. Pt was I prior to December (from initial injury of L hip fx). Pt would benefit from continued OT services to address performance with ADLs and functional transfers/mobility to return closely to PLOF. Education: Role of OT/POC, body mechanics, safety Patient will benefit from skilled intervention to address the above impairments. Patients rehabilitation potential is considered to be Fair Factors which may influence rehabilitation potential include:  
[]             None noted [x]             Mental ability/status [x]             Medical condition []             Home/family situation and support systems [x]             Safety awareness []             Pain tolerance/management 
[]             Other: PLAN : 
Recommendations and Planned Interventions: 
[]               Self Care Training                  []        Therapeutic Activities []               Functional Mobility Training    []        Cognitive Retraining 
[]               Therapeutic Exercises           []        Endurance Activities []               Balance Training                   []        Neuromuscular Re-Education []               Visual/Perceptual Training     []   Home Safety Training 
[]               Patient Education                 []        Family Training/Education []               Other (comment): Frequency/Duration: Patient will be followed by occupational therapy 3-5x/wk for 1week to address goals. Discharge Recommendations: Rehab Further Equipment Recommendations for Discharge: TBD at next level of care SUBJECTIVE:  
Patient stated Don't touch me.  OBJECTIVE DATA SUMMARY:  
 
Past Medical History:  
Diagnosis Date  At risk for falls  Cancer [facility])   
 prostate [address]  GERD (gastroesophageal reflux disease)  Parkinson disease [address] [address]  Psychiatric disorder   
 depression Past Surgical History:  
Procedure Laterality Date  HX CATARACT REMOVAL Bilateral   
 HX HERNIA REPAIR    
 x2  
 HX LAP CHOLECYSTECTOMY  HX VASECTOMY Barriers to Learning/Limitations: yes;  cognitive, physical and altered mental status (i.e.Sedation, Confusion) Compensate with: visual, verbal, tactile, kinesthetic cues/model Prior Level of Function/Home Situation: Pt was I prior to December 18th 2018 with ADLs/IADLs. Completing Functional mobility without AD. Home Situation Home Environment: Rehabilitation facility One/Two Story Residence: One story Living Alone: No 
Support Systems: Family member(s) Patient Expects to be Discharged to[de-identified] Rehabilitation facility Current DME Used/Available at Home: None Cognitive/Behavioral Status: 
Neurologic State: Confused; Alert Orientation Level: Disoriented to place; Disoriented to situation;Disoriented to time Cognition: Impaired decision making;Poor safety awareness Safety/Judgement: Lack of insight into deficits; Decreased awareness of environment;Decreased awareness of need for assistance;Decreased awareness of need for safety Skin: R hip incision w/ Mepilex Coordination: 
Coordination: Generally decreased, functional 
   Strength: BUE Strength: Within functional limits Range of Motion: BUE 
AROM: Generally decreased, functional 
 Functional Mobility and Transfers for ADLs: 
Bed Mobility: 
 Supine to Sit: Contact guard assistance; Moderate assistance(fluctuating assistance needed) ADL Assessment:  
 Upper Body Dressing: Other (comment); Moderate assistance(unable to fully assess) Lower Body Dressing: Other (comment); Maximum assistance(uanble to fully assess) ADL Intervention: Toileting Toileting Assistance: Maximum assistance; Total assistance(dependent)(incontinence) Clothing Management: Total assistance (dependent) Cognitive Retraining Safety/Judgement: Lack of insight into deficits; Decreased awareness of environment;Decreased awareness of need for assistance;Decreased awareness of need for safety Pain: 
Pre-treatment: Pt did not state but did not appear to be in pain Post-treatment: Pt did not state but did  Not appear to be in pain Activity Tolerance:  
Pt tolerated treatment well no signs of fatigue or increase pain Please refer to the flowsheet for vital signs taken during this treatment. After treatment:  
[] Patient left in no apparent distress sitting up in chair 
[x] Patient left in no apparent distress in bed 
[x] Call bell left within reach [x] Nursing notified 
[x] Caregiver present  (SLP present in room as sitter) [x] Bed alarm activated COMMUNICATION/EDUCATION:  
[] Home safety education was provided and the patient/caregiver indicated understanding. [] Patient/family have participated as able in goal setting and plan of care. [] Patient/family agree to work toward stated goals and plan of care. [] Patient understands intent and goals of therapy, but is neutral about his/her participation. [x] Patient is unable to participate in goal setting and plan of care. Thank you for this referral. 
Amelia Maldonado OTR/L Time Calculation: 43 mins Eval Complexity: History: HIGH Complexity : Extensive review of history including physical, cognitive and psychosocial history ; Examination: HIGH Complexity : 5 or more performance deficits relating to physical, cognitive , or psychosocial skils that result in activity limitations and / or participation restrictions; Decision Making:HIGH Complexity : Patient presents with comorbidities that affect occupational performance. Signifigant modification of tasks or assistance (eg, physical or verbal) with assessment (s) is necessary to enable patient to complete evaluation

## 2019-01-15 NOTE — ROUTINE PROCESS
Bedside shift change report given to Gurjit (oncoming nurse) by Shanell Matos RN (offgoing nurse). Report included the following information SBAR, Kardex, Intake/Output, MAR, Recent Results, Med Rec Status and Alarm Parameters .

## 2019-01-15 NOTE — PROGRESS NOTES
D/C Plan: SNF 
 
CM met with pt and his daughter at bedside to discuss transition of care options. Pt's daughter has given permission to have clinical infromation sent to [address] and [address] [place] [address]. CMS has been notified to assist.  Pt has been accepted to both facilities. CM contacted pt's daughter and provided an update. Pt's daughter would like to consult with her brother (finicial POA) before making a decision. Noted pt has an order for a sitter. Pt will have to be sitter free for 24-48 hours before he would be able to transition to SNF. Anticipate pt will transition to SNF Thursday vs Friday pending sitter being discontinued. CM continuing to follow and assist with SNF. Care Management Interventions PCP Verified by CM: Yes Transition of Care Consult (CM Consult): Discharge Planning, SNF Current Support Network: Family Lives [facility](was at [place] [id number] for rehab) Confirm Follow Up Transport: Family Plan discussed with Pt/Family/Caregiver: Yes Freedom of Choice Offered: Yes Discharge Location Discharge Placement: Skilled nursing facility

## 2019-01-15 NOTE — PROGRESS NOTES
Pt noted to be severely agitated pulling on lines grabbing at sheets, talking to himself. Pt verbally abusive to family member and staff, not redirectable. MD on call paged and notified of behavior and stated that this is normal for pt with Parkinson disease to respond this way after anesthesia. Sitter was ordered for safety as pt continously tries to get out of bed and tries to put the side rails down. Will continue to monitor. Bedside shift change report given to DAVID Sanderson (oncoming nurse) by ANDREW Malhotra (offgoing nurse). Report included the following information SBAR, Kardex, Intake/Output and MAR.

## 2019-01-15 NOTE — PROGRESS NOTES
Progress Note Patient: Yuri Riley MRN: [id number]  SSN: [id number] YOB: 1935  Age: 82 y.o. Sex: male POD:    1 Day Post-Op S/P:    Procedure(s): HIP CLOSED REDUCTION INTERNAL FIXATION, RIGHT HIP, HANA TABLE, C-ARM Subjective:  
Pt is sleeping in bed. Daughter, Vega Hernandez, at bedside who gave me the report. Vega Hernandez states that patient is doing well with little discomfort. She thinks the tylenol is working well for him. She states that he has not yet had physical therapy. Discharge planning will be to a rehab facility vs. Inpatient rehab. Objective:  
 
Patient Vitals for the past 12 hrs: 
 BP Temp Pulse Resp SpO2 Weight 01/15/19 0322 (!) 172/97 99.1 °F (37.3 °C) 95 16 92 %   
01/14/19 2204 154/71 98 °F (36.7 °C) 81 16 99 % 76.4 kg (168 lb 6.9 oz) Recent Labs  
  01/15/19 
0315 HGB 13.3 HCT 41.0   
K 3.8 * CO2 29 BUN 15  
CREA 0.82 * Pt. resting in bed. Lower extremity operative dressing clean/dry/intact; two areas of blood shadowing on bandage. Neurovascularly intact. Assessment: S/p R hip closed reduction internal fixation Plan: 1. Continue pain management/ ice to operative area. Tylenol seems to be working fairly well so far. 2. Continue to progress with PT/OT. Patient is to be 50% weight bearing on the right leg with a walker. 3. Discharge planning per primary team. Patient will be discharged to rehab facility of the family's preference.

## 2019-01-15 NOTE — PROGRESS NOTES
Progress Note Patient: Ana M Padilla MRN: [id number]  SSN: [id number] YOB: 1935  Age: 82 y.o. Sex: male POD:    1 Day Post-Op S/P:    Procedure(s): HIP CLOSED REDUCTION INTERNAL FIXATION, RIGHT HIP, HANA TABLE, C-ARM Subjective:  
Pt is without complaints of pain. Objective:  
 
Patient Vitals for the past 12 hrs: 
 BP Temp Pulse Resp SpO2 Weight 01/15/19 0322 (!) 172/97 99.1 °F (37.3 °C) 95 16 92 %   
01/14/19 2204 154/71 98 °F (36.7 °C) 81 16 99 % 76.4 kg (168 lb 6.9 oz) 01/14/19 1958 123/64 98.5 °F (36.9 °C) 77 17 98 %  Recent Labs  
  01/15/19 
0315  01/12/19 
0715 HGB 13.3   < > 14.1 HCT 41.0   < > 43.2 INR  --   --  1.1    < > 140  
K 3.8   < > 3.9 *   < > 106 CO2 29   < > 27 BUN 15   < > 15 CREA 0.82   < > 0.76 *   < > 101*  
 < > = values in this interval not displayed. Pt resting in bed. Lower extremity operative dressing clean/dry/intact. Neurovascularly intact. Assessment:  
 
Awake and alert. In good spirits. X rays satisfactory. Has not done PT yet Plan: 1. Continue pain management/ ice to operative area. 2. Continue to progress with PT/OT. 3. Discharge planning to skilled nursing facility vs. inpatient rehab. 
4. Will need to work with discharge planning to consider change in rehab facility

## 2019-01-15 NOTE — ROUTINE PROCESS
1935 - Bedside and Verbal shift change report given to Andrew Miles RN by Simeon Wyatt RN. Report included the following information SBAR, Kardex, OR Summary, Intake/Output and MAR.

## 2019-01-15 NOTE — PROGRESS NOTES
Problem: Falls - Risk of 
Goal: *Absence of Falls Document Aleks Orellana Fall Risk and appropriate interventions in the flowsheet. Outcome: Progressing Towards Goal 
Fall Risk Interventions: 
Mobility Interventions: Bed/chair exit alarm, PT Consult for mobility concerns Mentation Interventions: Bed/chair exit alarm, Door open when patient unattended, More frequent rounding, Reorient patient, Room close to nurse's station Medication Interventions: Bed/chair exit alarm, Evaluate medications/consider consulting pharmacy Elimination Interventions: Bed/chair exit alarm, Call light in reach History of Falls Interventions: Bed/chair exit alarm, Door open when patient unattended, Evaluate medications/consider consulting pharmacy Problem: Pressure Injury - Risk of 
Goal: *Prevention of pressure injury Document Yan Scale and appropriate interventions in the flowsheet. Outcome: Progressing Towards Goal 
Pressure Injury Interventions: 
Sensory Interventions: Avoid rigorous massage over bony prominences, Turn and reposition approx. every two hours (pillows and wedges if needed) Moisture Interventions: Check for incontinence Q2 hours and as needed, Contain wound drainage Activity Interventions: Pressure redistribution bed/mattress(bed type), PT/OT evaluation Mobility Interventions: Pressure redistribution bed/mattress (bed type), PT/OT evaluation Nutrition Interventions: Document food/fluid/supplement intake, Discuss nutritional consult with provider Friction and Shear Interventions: Lift sheet Problem: Pain Goal: *Control of Pain Outcome: Progressing Towards Goal 
Pt pain was maintained at a tolerable level this shift

## 2019-01-15 NOTE — PROGRESS NOTES
Problem: Mobility Impaired (Adult and Pediatric) Goal: *Acute Goals and Plan of Care (Insert Text) Physical Therapy Goals Initiated 1/15/2019  to be met within 5-7 days 1. Bed mobility:  Supine to/from sit with S in prep for ADL activity. 2. Transfers:  Sit to/from stand with min/CGA 50% wt bearing R LE in prep for gait. 3. Tolerate sitting up in chair >30min for functional activity performance. 4. Ambulation:  Ambulate 30 ft. with min/CGA/RW 50% wt bearing R LE for increased functional mobility at discharge. physical Therapy EVALUATION Patient: Jose Arias (60 y.o. male) Date: 1/15/2019 Primary Diagnosis: Closed right hip fracture ([address] Procedure(s) (LRB): HIP CLOSED REDUCTION INTERNAL FIXATION, RIGHT HIP, HANA TABLE, C-ARM (Right) 1 Day Post-Op Precautions:Bed Alarm, Fall, PWB(50% WB RLE) ASSESSMENT : 
Based on the objective data described below, the patient is an 83 yo M seen on medical unit with OT/L for 2 sets of skilled hands presenct. Pt alert, confused, poor safety awareness, trying to exit bed and intermittently combative. Dressing lateral R hip intact. Pt presents with limitations in safety, and all functional mobility tasks. Does not appear in pain. Pt required mod A to CGA to transition to sit EOB, fluctuating assistance needed. Pt demonstrated inconsistent sitting balance EOB, frequently leaning posteriorly, then supporting self with only CGA/S.  pt requesting to stand, however, unable to follow commands for safe performance, therefor standing not attempted. Pt refused to transition back to supine, thus requiring total assist of 4 to return to supine, and to have brief changed by nurse [address] [place] [address]. Pt left supine in bed, nurse Мария miller (rehab manager) present for pt safety; pt daughter present during part of session, however, unable to locate daughter at this time.    Recommend pt resume PT in skilled nursing setting once able to follow commands for appropriate participation in session. Confusion most likely related to post op anesthesia in presence of Parkinsion's disease. Pt Education: pt educated in safety/technique during functional mobility tasks Patient will benefit from skilled intervention to address the above impairments. Patients rehabilitation potential is considered to be Fair Factors which may influence rehabilitation potential include:  
[]         None noted 
[x]         Mental ability/status [x]         Medical condition 
[]         Home/family situation and support systems 
[x]         Safety awareness 
[]         Pain tolerance/management 
[]         Other: PLAN : 
Recommendations and Planned Interventions: 
[x]           Bed Mobility Training             []    Neuromuscular Re-Education 
[x]           Transfer Training                   []    Orthotic/Prosthetic Training 
[x]           Gait Training                          []    Modalities [x]           Therapeutic Exercises          []    Edema Management/Control 
[x]           Therapeutic Activities            [x]    Patient and Family Training/Education 
[]           Other (comment): Frequency/Duration: Patient will be followed by physical therapy daily to address goals. Discharge Recommendations: Gino Guajardo Further Equipment Recommendations for Discharge: N/A  
 
SUBJECTIVE:  
Patient stated I need to go.  OBJECTIVE DATA SUMMARY:  
 
Past Medical History:  
Diagnosis Date  At risk for falls  Cancer [facility])   
 prostate [address]  GERD (gastroesophageal reflux disease)  Parkinson disease [address] [address]  Psychiatric disorder   
 depression Past Surgical History:  
Procedure Laterality Date  HX CATARACT REMOVAL Bilateral   
 HX HERNIA REPAIR    
 x2  
 HX LAP CHOLECYSTECTOMY  HX VASECTOMY Barriers to Learning/Limitations: yes;  physical and altered mental status (i.e.Sedation, Confusion) Compensate with: Visual Cues, Verbal Cues and Tactile Cues Prior Level of Function/Home Situation: ambulating with RW/max A of 1 for walker management at [facility] of discharge from [facility] in December 2019 following L hip fracture Home Situation Home Environment: Rehabilitation facility One/Two Story Residence: One story Living Alone: No 
Support Systems: Family member(s) Patient Expects to be Discharged to[de-identified] Rehabilitation facility Current DME Used/Available at Home: NoneCritical Behavior: 
Neurologic State: Confused; Alert Orientation Level: Disoriented to place; Disoriented to situation;Disoriented to time Cognition: Impaired decision making;Poor safety awareness Safety/Judgement: Lack of insight into deficits; Decreased awareness of environment;Decreased awareness of need for assistance;Decreased awareness of need for safety Psychosocial 
Patient Behaviors: Confused; Anxious; Altered mental status; Agitated Family  Behaviors: Calm; Cooperative;Supportive Purposeful Interaction: Yes Pt Identified Daily Priority: Clinical issues (comment) Caritas Process: Attend basic human needs Caring Interventions: Reassure; Therapeutic modalities Reassure: Therapeutic listeningSkin Condition/Temp: Dry;Warm Family  Behaviors: Calm; Cooperative;Supportive Skin Integrity: Incision (comment)(rt hip ) Skin Integumentary Skin Color: Appropriate for ethnicity Skin Condition/Temp: Dry;Warm 
Skin Integrity: Incision (comment)(rt hip ) Turgor: Epidermis thin w/ loss of subcut tissue Hair Growth: Absent Varicosities: Absent Strength:   
Strength: Within functional limits Range Of Motion: 
AROM: Generally decreased, functional 
Functional Mobility: 
Bed Mobility: 
Supine to Sit: Contact guard assistance; Moderate assistance(fluctuating assistance needed) Balance:  
Sitting: Impaired; With support; Without support(variable throughout session)Ambulation/Gait Training: 
Gait Description (WDL): (unsafe to assess) Pain: Pain Scale 1: Adult Nonverbal Pain Scale Pain Intensity 1: 0 Activity Tolerance:  
Poor Please refer to the flowsheet for vital signs taken during this treatment. After treatment:  
[]         Patient left in no apparent distress sitting up in chair 
[x]         Patient left in no apparent distress in bed 
[]         Call bell left within reach [x]         Nursing notified 
[x]         [facility] (Rehab manager) present [x]         Bed alarm activated COMMUNICATION/EDUCATION:  
[]         Fall prevention education was provided and the patient/caregiver indicated understanding. []         Patient/family have participated as able in goal setting and plan of care. []         Patient/family agree to work toward stated goals and plan of care. []         Patient understands intent and goals of therapy, but is neutral about his/her participation. [x]         Patient is unable to participate in goal setting and plan of care. Eval Complexity: History: HIGH Complexity :3+ comorbidities / personal factors will impact the outcome/ POC Exam:LOW Complexity : 1-2 Standardized tests and measures addressing body structure, function, activity limitation and / or participation in recreation  Presentation: HIGH Complexity : Unstable and unpredictable characteristics  Clinical Decision Making:High Complexity as noted above Overall Complexity:HIGH Thank you for this referral. 
Reagan Simons, PT Time Calculation: 43 mins

## 2019-01-16 LAB
ANION GAP SERPL CALC-SCNC: 5 MMOL/L (ref 3–18)
BUN SERPL-MCNC: 15 MG/DL (ref 7–18)
BUN/CREAT SERPL: 20 (ref 12–20)
CALCIUM SERPL-MCNC: 7.8 MG/DL (ref 8.5–10.1)
CHLORIDE SERPL-SCNC: 109 MMOL/L (ref 100–108)
CO2 SERPL-SCNC: 28 MMOL/L (ref 21–32)
CREAT SERPL-MCNC: 0.76 MG/DL (ref 0.6–1.3)
GLUCOSE SERPL-MCNC: 108 MG/DL (ref 74–99)
HCT VFR BLD AUTO: 38.4 % (ref 36–48)
HGB BLD-MCNC: 12.4 G/DL (ref 13–16)
POTASSIUM SERPL-SCNC: 4.7 MMOL/L (ref 3.5–5.5)
SODIUM SERPL-SCNC: 142 MMOL/L (ref 136–145)

## 2019-01-16 PROCEDURE — 85018 HEMOGLOBIN: CPT

## 2019-01-16 PROCEDURE — 74011250637 HC RX REV CODE- 250/637: Performed by: PHYSICIAN ASSISTANT

## 2019-01-16 PROCEDURE — 80048 BASIC METABOLIC PNL TOTAL CA: CPT

## 2019-01-16 PROCEDURE — 97530 THERAPEUTIC ACTIVITIES: CPT

## 2019-01-16 PROCEDURE — 74011250636 HC RX REV CODE- 250/636: Performed by: HOSPITALIST

## 2019-01-16 PROCEDURE — 97535 SELF CARE MNGMENT TRAINING: CPT

## 2019-01-16 PROCEDURE — 65270000029 HC RM PRIVATE

## 2019-01-16 PROCEDURE — 36415 COLL VENOUS BLD VENIPUNCTURE: CPT

## 2019-01-16 PROCEDURE — 74011000250 HC RX REV CODE- 250: Performed by: HOSPITALIST

## 2019-01-16 PROCEDURE — 74011250637 HC RX REV CODE- 250/637: Performed by: HOSPITALIST

## 2019-01-16 RX ADMIN — ENOXAPARIN SODIUM 40 MG: 40 INJECTION SUBCUTANEOUS at 12:06

## 2019-01-16 RX ADMIN — ACETAMINOPHEN 650 MG: 325 TABLET ORAL at 22:14

## 2019-01-16 RX ADMIN — MIRABEGRON 50 MG: 25 TABLET, FILM COATED, EXTENDED RELEASE ORAL at 08:38

## 2019-01-16 RX ADMIN — CARBIDOPA AND LEVODOPA 0.5 TABLET: 25; 100 TABLET ORAL at 12:07

## 2019-01-16 RX ADMIN — ACETAMINOPHEN 650 MG: 325 TABLET ORAL at 15:57

## 2019-01-16 RX ADMIN — PANTOPRAZOLE SODIUM 40 MG: 40 TABLET, DELAYED RELEASE ORAL at 08:38

## 2019-01-16 RX ADMIN — CYCLOSPORINE 1 DROP: 0.5 EMULSION OPHTHALMIC at 21:36

## 2019-01-16 RX ADMIN — ENTACAPONE 200 MG: 200 TABLET, FILM COATED ORAL at 12:07

## 2019-01-16 RX ADMIN — ACETAMINOPHEN 650 MG: 325 TABLET ORAL at 03:40

## 2019-01-16 RX ADMIN — CARBIDOPA AND LEVODOPA 1.5 TABLET: 25; 100 TABLET ORAL at 08:37

## 2019-01-16 RX ADMIN — DIPHENHYDRAMINE HYDROCHLORIDE 25 MG: 25 CAPSULE ORAL at 03:40

## 2019-01-16 RX ADMIN — SERTRALINE HYDROCHLORIDE 50 MG: 50 TABLET ORAL at 08:38

## 2019-01-16 RX ADMIN — CARBIDOPA AND LEVODOPA 0.5 TABLET: 25; 100 TABLET ORAL at 08:37

## 2019-01-16 RX ADMIN — CARBIDOPA AND LEVODOPA 0.5 TABLET: 25; 100 TABLET ORAL at 15:57

## 2019-01-16 RX ADMIN — Medication 5 ML: at 14:00

## 2019-01-16 RX ADMIN — CARBIDOPA AND LEVODOPA 1.5 TABLET: 25; 100 TABLET ORAL at 15:58

## 2019-01-16 RX ADMIN — Medication 10 ML: at 21:39

## 2019-01-16 RX ADMIN — ENTACAPONE 200 MG: 200 TABLET, FILM COATED ORAL at 21:29

## 2019-01-16 RX ADMIN — ACETAMINOPHEN 650 MG: 325 TABLET ORAL at 12:07

## 2019-01-16 RX ADMIN — CARBIDOPA AND LEVODOPA 0.5 TABLET: 25; 100 TABLET ORAL at 21:27

## 2019-01-16 RX ADMIN — CARBIDOPA AND LEVODOPA 1.5 TABLET: 25; 100 TABLET ORAL at 21:28

## 2019-01-16 RX ADMIN — RANITIDINE 150 MG: 150 TABLET ORAL at 21:28

## 2019-01-16 RX ADMIN — RASAGILINE 1 MG: 1 TABLET ORAL at 21:36

## 2019-01-16 RX ADMIN — RANITIDINE 150 MG: 150 TABLET ORAL at 08:37

## 2019-01-16 RX ADMIN — ENTACAPONE 200 MG: 200 TABLET, FILM COATED ORAL at 15:57

## 2019-01-16 RX ADMIN — ENTACAPONE 200 MG: 200 TABLET, FILM COATED ORAL at 08:38

## 2019-01-16 RX ADMIN — CARBIDOPA AND LEVODOPA 1.5 TABLET: 25; 100 TABLET ORAL at 12:08

## 2019-01-16 NOTE — ROUTINE PROCESS
Bedside and Verbal shift change report given to Андрей Andrew RN (oncoming nurse) by Sarai Schroeder RN (offgoing nurse). Report included the following information SBAR, Kardex, ED Summary, OR Summary, Procedure Summary, Intake/Output, MAR, Recent Results and Med Rec Status.

## 2019-01-16 NOTE — PROGRESS NOTES
Progress Note Patient: Gilberto Bear MRN: [id number]  SSN: [id number] YOB: 1935  Age: 82 y.o. Sex: male POD:    2 Days Post-Op S/P:    Procedure(s): HIP CLOSED REDUCTION INTERNAL FIXATION, RIGHT HIP, HANA TABLE, C-ARM Subjective:  
Pt is without complaints of pain. Objective:  
 
Patient Vitals for the past 12 hrs: 
 BP Temp Pulse Resp SpO2  
01/16/19 1528 176/86 98.3 °F (36.8 °C) 71 16 96 % 01/16/19 1150 (!) 151/104 98.5 °F (36.9 °C) 72 16   
01/16/19 0807 (!) 168/92 98.8 °F (37.1 °C) 72 16 98 % Recent Labs  
  01/16/19 
0345 HGB 12.4*  
HCT 38.4   
K 4.7 * CO2 28 BUN 15  
CREA 0.76 * Pt resting in bed. Lower extremity operative dressing clean/dry/intact. Neurovascularly intact. Assessment:  
 
Awake and alert. In good spirits. Continued confusion today. Probable transfer tomorrow. Plan: 1. Continue pain management/ ice to operative area. 2. Continue to progress with PT/OT. 3. Discharge planning to skilled nursing facility vs. inpatient rehab.

## 2019-01-16 NOTE — PROGRESS NOTES
Hospitalist Progress Note Patient: Ermalene Romberg MRN: [id number]  CSN: [id number] YOB: 1935  Age: 82 y.o. Sex: male DOA: 1/12/2019 LOS:  LOS: 4 days Assessment/Plan Patient Active Problem List  
Diagnosis Code  Parkinson disease ([facility] [address] G20  
 Closed right hip fracture ([facility] [address] S72.001A  GERD (gastroesophageal reflux disease) K21.9  Falls W19. Shelia Mendenhall  
  
 
81 yo male admitted for fall and right hip fracture 
 
pleasantly demented, no acute events overnight, trying to get out of bed. Left hip fracture - s/p multiple cannulated screws right hip. PT/OT per orthopedics. parkinsons disease - continue with home sinemet.  
  
GERD - continue with PPI. Dementia 
  
Fall precautions  
  
DVT prophylaxis, Disposition : 2 days Review of systems General: No fevers or chills. Cardiovascular: No chest pain or pressure. No palpitations. Pulmonary: No shortness of breath. Gastrointestinal: No nausea, vomiting. Physical Exam: 
General: Awake, cooperative, no acute distress   
HEENT: NC, Atraumatic. PERRLA, anicteric sclerae. Lungs: CTA Bilaterally. No Wheezing/Rhonchi/Rales. Heart:  S1 S2,  No murmur, No Rubs, No Gallops Abdomen: Soft, Non distended, Non tender.  +Bowel sounds, Extremities: No c/c/e Psych:   Not anxious or agitated. Neurologic:  No acute neurological deficit. Vital signs/Intake and Output: 
Visit Vitals BP (!) 151/104 (BP 1 Location: Right arm, BP Patient Position: At rest;Supine) Pulse 72 Temp 98.5 °F (36.9 °C) Resp 16 Ht 5' 8\" (1.727 m) Wt 76.7 kg (169 lb) SpO2 98% BMI 25.70 kg/m² Current Shift:  01/16 0701 - 01/16 1900 In: 120 [P.O.:120] Out: - Last three shifts:  01/14 1901 - 01/16 0700 In: 2427.9 [P.O.:830; I.V.:1497.9] Out: -  
 
 
 
 
 
Labs: Results:  
   
Chemistry Recent Labs  
  01/16/19 
0345 01/15/19 
0315 01/14/19 1948 * 105* 176*  144 143  
K 4.7 3.8 3.9 * 109* 109* CO2 28 29 29 BUN 15 15 15 CREA 0.76 0.82 0.91  
CA 7.8* 8.2* 7.8* AGAP 5 6 5 BUCR 20 18 16 CBC w/Diff Recent Labs  
  01/16/19 
0345 01/15/19 
0315 01/14/19 
1948 WBC  --  7.3  --   
RBC  --  4.38*  --   
HGB 12.4* 13.3 13.2 HCT 38.4 41.0 40.8 PLT  --  178  --   
  
Cardiac Enzymes No results for input(s): CPK, CKND1, RUBIA in the last 72 hours. No lab exists for component: Kyburz Bird Coagulation No results for input(s): PTP, INR, APTT in the last 72 hours. No lab exists for component: INREXT, INREXT Lipid Panel No results found for: CHOL, CHOLPOCT, CHOLX, CHLST, CHOLV, 179040, HDL, LDL, LDLC, DLDLP, 255849, VLDLC, VLDL, TGLX, TRIGL, TRIGP, TGLPOCT, CHHD, CHHDX  
BNP No results for input(s): BNPP in the last 72 hours. Liver Enzymes No results for input(s): TP, ALB, TBIL, AP, SGOT, GPT in the last 72 hours. No lab exists for component: DBIL Thyroid Studies No results found for: T4, T3U, TSH, TSHEXT, TSHEXT Procedures/imaging: see electronic medical records for all procedures/Xrays and details which were not copied into this note but were reviewed prior to creation of Plan

## 2019-01-16 NOTE — PROGRESS NOTES
Problem: Mobility Impaired (Adult and Pediatric) Goal: *Acute Goals and Plan of Care (Insert Text) Physical Therapy Goals Initiated 1/15/2019  to be met within 5-7 days 1. Bed mobility:  Supine to/from sit with S in prep for ADL activity. 2. Transfers:  Sit to/from stand with min/CGA 50% wt bearing R LE in prep for gait. 3. Tolerate sitting up in chair >30min for functional activity performance. 4. Ambulation:  Ambulate 30 ft. with min/CGA/RW 50% wt bearing R LE for increased functional mobility at discharge. Outcome: Not Progressing Towards Goal 
physical Therapy TREATMENT Patient: Jan Bowers (84 y.o. male) Date: 1/16/2019 Diagnosis: Closed right hip fracture (HCC) <principal problem not specified> Procedure(s) (LRB): HIP CLOSED REDUCTION INTERNAL FIXATION, RIGHT HIP, HANA TABLE, C-ARM (Right) 2 Days Post-Op Precautions: Bed Alarm, Fall, PWB(50% WB RLE) Chart, physical therapy assessment, plan of care and goals were reviewed. ASSESSMENT: 
Pt is very confused and and interacting with unseen stimuli. Pt is easily convinced to transition to EOB, Pt shifts weight heavily to Left, but able to establish midline sitting. Pt crossed legs and drank imaginary coffee, for 5 min. 2 standing attempts completed, with emphasis on not shifting weight beyond midline for Right LE. Pt returned to supine, with max total assist. Improvement in mentation or increase of WB status will be needed to make progress in near future. Progression toward goals: 
[]      Improving appropriately and progressing toward goals [x]      Improving slowly and progressing toward goals 
[]      Not making progress toward goals and plan of care will be adjusted PLAN: 
Patient continues to benefit from skilled intervention to address the above impairments. Continue treatment per established plan of care. Discharge Recommendations:  Gino Guajardo Further Equipment Recommendations for Discharge:  N/A  
 
SUBJECTIVE:  
Patient stated My hands are full. Can you help.  (Pt with hand out, interacting with unseen stimuli) OBJECTIVE DATA SUMMARY:  
Critical Behavior: 
Neurologic State: Confused Orientation Level: Disoriented X4 Cognition: Impaired decision making, Memory loss, Poor safety awareness Safety/Judgement: Lack of insight into deficits, Decreased awareness of environment, Decreased awareness of need for assistance, Decreased awareness of need for safety Functional Mobility Training: 
Bed Mobility: 
Rolling: Moderate assistance Supine to Sit: Moderate assistance Transfers: 
Sit to Stand: Maximum assistance Stand to Sit: Maximum assistance Balance: 
Sitting: Impaired; With support Sitting - Static: Fair (occasional) Sitting - Dynamic: Poor (constant support) Standing: Impaired; With support Standing - Static: PoorTherapeutic Exercises: PROM of R hip Pain: 
Pain Scale 1: Numeric (0 - 10) Pain Intensity 1: 0 Pain out: 0 Activity Tolerance:  
poor Please refer to the flowsheet for vital signs taken during this treatment. After treatment:  
[] Patient left in no apparent distress sitting up in chair 
[x] Patient left in no apparent distress in bed 
[x] Call bell left within reach [x] Nursing notified 
[] Caregiver present [x] Bed alarm activated Yinka Holly PTA Time Calculation: 26 mins

## 2019-01-16 NOTE — PROGRESS NOTES
D/C Plan: SNF Noted pt now with a sitter. Pt will have to be sitter free for 24-48 hours before he would be able to transition to SNF. Anticipate pt will transition to SNF Thursday vs Friday pending sitter being discontinued. CM continuing to follow and assist with SNF. 
 
1336: 
Noted sitter has been discontinued. Both facilities have been provided an update. Care Management Interventions PCP Verified by CM: Yes Transition of Care Consult (CM Consult): Discharge Planning, SNF Current Support Network: [facility](was at [place] [id number] for rehab) Confirm Follow Up Transport: Family Plan discussed with Pt/Family/Caregiver: Yes Freedom of Choice Offered: Yes Discharge Location Discharge Placement: Skilled nursing facility

## 2019-01-16 NOTE — PROGRESS NOTES
Progress Note Patient: Melinda Boyce MRN: [id number]  SSN: [id number] YOB: 1935  Age: 82 y.o. Sex: male POD:    2 Days Post-Op S/P:    Procedure(s): HIP CLOSED REDUCTION INTERNAL FIXATION, RIGHT HIP, HANA TABLE, C-ARM Subjective:  
Patient is an unreliable historian due to dementia. Patient states that he has not yet been up with PT. He denies right hip pain. Objective:  
 
Patient Vitals for the past 12 hrs: 
 BP Temp Pulse Resp SpO2  
01/16/19 0300 174/71 98.7 °F (37.1 °C) 79 19 98 % 01/15/19 2240 166/82 98.2 °F (36.8 °C) 74 16 95 % Recent Labs  
  01/16/19 
0345 HGB 12.4*  
HCT 38.4   
K 4.7 * CO2 28 BUN 15  
CREA 0.76 * Pt. resting in bed. Lower extremity operative dressing clean/dry/intact. Neurovascularly intact. Calves soft, nontender. Assessment:  
 
Awake and in good spirits. S/P closed reduction internal fixation R hip Plan: 1. Continue pain management/ ice to operative area. 2. Continue to progress with PT/OT. Patient encouraged to walk today. 50% weight bearing on R hip with walker 3. DVT prophylaxis per primary team 
4.  Discharge planning to skilled nursing facility vs. inpatient rehab per primary team

## 2019-01-16 NOTE — PROGRESS NOTES
Problem: Falls - Risk of 
Goal: *Absence of Falls Document Dona Masters Fall Risk and appropriate interventions in the flowsheet. Outcome: Progressing Towards Goal 
Fall Risk Interventions: 
Mobility Interventions: Assess mobility with egress test, Bed/chair exit alarm, PT Consult for mobility concerns, PT Consult for assist device competence, Utilize walker, cane, or other assistive device Mentation Interventions: Adequate sleep, hydration, pain control, Bed/chair exit alarm, Evaluate medications/consider consulting pharmacy, Family/sitter at bedside, Increase mobility, Reorient patient, Room close to nurse's station, Update white board, More frequent rounding Medication Interventions: Bed/chair exit alarm, Patient to call before getting OOB, Evaluate medications/consider consulting pharmacy, Teach patient to arise slowly Elimination Interventions: Call light in reach, Bed/chair exit alarm, Toileting schedule/hourly rounds History of Falls Interventions: Bed/chair exit alarm, Consult care management for discharge planning, Evaluate medications/consider consulting pharmacy, Room close to nurse's station Problem: Pressure Injury - Risk of 
Goal: *Prevention of pressure injury Document Ayn Scale and appropriate interventions in the flowsheet. Outcome: Progressing Towards Goal 
Pressure Injury Interventions: 
Sensory Interventions: Assess changes in LOC, Keep linens dry and wrinkle-free, Avoid rigorous massage over bony prominences, Turn and reposition approx. every two hours (pillows and wedges if needed) Moisture Interventions: Absorbent underpads, Moisture barrier, Maintain skin hydration (lotion/cream) Activity Interventions: Increase time out of bed, PT/OT evaluation, Pressure redistribution bed/mattress(bed type) Mobility Interventions: HOB 30 degrees or less, PT/OT evaluation, Pressure redistribution bed/mattress (bed type), Turn and reposition approx.  every two hours(pillow and wedges) Nutrition Interventions: Document food/fluid/supplement intake, Discuss nutritional consult with provider Friction and Shear Interventions: Apply protective barrier, creams and emollients, HOB 30 degrees or less Problem: Pain Goal: *Control of Pain Outcome: Progressing Towards Goal 
Pt denies pain

## 2019-01-16 NOTE — PROGRESS NOTES
Problem: Mobility Impaired (Adult and Pediatric) Goal: *Acute Goals and Plan of Care (Insert Text) Physical Therapy Goals Initiated 1/15/2019  to be met within 5-7 days 1. Bed mobility:  Supine to/from sit with S in prep for ADL activity. 2. Transfers:  Sit to/from stand with min/CGA 50% wt bearing R LE in prep for gait. 3. Tolerate sitting up in chair >30min for functional activity performance. 4. Ambulation:  Ambulate 30 ft. with min/CGA/RW 50% wt bearing R LE for increased functional mobility at discharge. Outcome: Not Progressing Towards Goal 
Pt refused PT due to: 
[x]  Confused and aggressively refusing any movement.  
[]  Eating 
[]  Pain 
[]  Pt lethargic 
[]  Off Unit Will f/u later, as pt's schedule allows. Thank you.  
Lila Del Rosario, PTA

## 2019-01-16 NOTE — PROGRESS NOTES
After Visit Summary   2018    Jaja Patton    MRN: [id number]           Patient Information     Date Of Birth          1979        Visit Information        Provider Department      2018 7:00 AM [facility] EEG TECH 4 [facility]        Today's Diagnoses     Epilepsy (H)    -  1       Follow-ups after your visit        Who to contact     Please call your clinic at [phone] to:    Ask questions about your health    Make or cancel appointments    Discuss your medicines    Learn about your test results    Speak to your doctor            Additional Information About Your Visit        MyChart Information     [employer] is an electronic gateway that provides easy, online access to your medical records. With [employer], you can request a clinic appointment, read your test results, renew a prescription or communicate with your care team.     To sign up for [employer] visit the website at [email or web address]   You will be asked to enter the access code listed below, as well as some personal information. Please follow the directions to create your username and password.     Your access code is: [email or web address]  Expires: 2018  2:48 PM     Your access code will  in 90 days. If you need help or a new code, please contact your [facility] or call [phone] for assistance.        Care EveryWhere ID     This is your Care EveryWhere ID. This could be used by other organizations to access your [facility] medical records  [id number]         Blood Pressure from Last 3 Encounters:   18 116/65   18 123/82    Weight from Last 3 Encounters:   18 68.7 kg (151 lb 7.3 oz)   18 66.5 kg (146 lb 9.6 oz)              Today, you had the following     No orders found for display         Today's Medication Changes      Notice     This visit is during an admission. Changes to the med list made in this visit will be reflected in the After Visit Summary of the  0745 - bedside report given by Breann John RN. Sitter at bedside 8396 - denies pain, sitter at bedside, pt disoriented x4. States that he can't tell me his name or birthday. Took pills whole, with sips of ginger ale, tolerated well 1035 - brief changed and latoya care provided. Patient tolerated well 1210 - pt eating lunch, sitter at bedside, tolerated medications whole with sips of water. Tylenol given, pt restless 1334 - pt resting in bed quietly but fidgeting r/t history of parkinson's. sitter no longer at bedside, bed alarm activated. 1414 - latoya care done, brief/pad changed, new gown and socks put on patient 1600- patient agitated, daughter at bedside admission.             Primary Care Provider Fax #    Physician No Ref-Primary [phone]       No address on file        Equal Access to Services     JUAN CONNOR : Hadjovita aad ku hadjimmy Staley, yany gillesralf, ashley piña, jaquan stafford laalejandraangel nascimento. So [place] -[phone].    ATENCIÓN: Si habla español, tiene a waters disposición servicios gratuitos de asistencia lingüística. Llame al [phone].    We comply with applicable federal civil rights laws and [place] laws. We do not discriminate on the basis of race, color, national origin, age, disability, sex, sexual orientation, or gender identity.            Thank you!     Thank you for choosing [facility]  for your care. Our goal is always to provide you with excellent care. Hearing back from our patients is one way we can continue to improve our services. Please take a few minutes to complete the written survey that you may receive in the mail after your visit with us. Thank you!             Your Updated Medication List - Protect others around you: Learn how to safely use, store and throw away your medicines at www.disposemymeds.org.      Notice     This visit is during an admission. Changes to the med list made in this visit will be reflected in the After Visit Summary of the admission.

## 2019-01-16 NOTE — PROGRESS NOTES
1930 - Bedside report obtained. Shift assessment completed at this time. Vital signs obtained:Blood pressure 132/76, pulse 83, temperature 99.1 °F (37.3 °C), resp. rate 17, height 5' 8\" (1.727 m), weight 76.7 kg (169 lb), SpO2 97 %. 5 - Staff present at bedside to ensure safety. 2055 - Called Pharmacy about insufficient quantity of medication stocked in \[facility]". Medication will be prepared and sent up. 2057 - Attempted to update family on status of medication, daughter not at bedside. 2100 - Daughter back at bedside, updated with medication status. 2115 - Patient medicated per MAR. Daughter and sitter at bedside. Patient tolerated taking pills whole, 1 at a time with ginger ale. 2240 - Vital signs obtainedL Blood pressure 166/82, pulse 74, temperature 98.2 °F (36.8 °C), resp. rate 16, height 5' 8\" (1.727 m), weight 76.7 kg (169 lb), SpO2 95 %. 2330 - Incontinence care provided with sitter at bedside. [id number] - Sat with patient while CNA took lunch break.  
 
0300 - Vital signs obtained: Blood pressure 174/71, pulse 79, temperature 98.7 °F (37.1 °C), resp. rate 19, height 5' 8\" (1.727 m), weight 76.7 kg (169 lb), SpO2 98 %. 0340 - Tylenol and Benadryl administered. 0430 - Incontinence care provided. 0600 - Patient in bed, attempting to be reoriented/redirected by CNA.

## 2019-01-16 NOTE — PROGRESS NOTES
Bedside, Verbal and Written shift change report given to Pam Lopez RN (oncoming nurse) by Kenny Rockwell RN (offgoing nurse). Report included the following information SBAR, Kardex, OR Summary, Procedure Summary, Intake/Output, MAR, Accordion, Recent Results and Med Rec Status. All questions answered. Sitter at bedside.

## 2019-01-16 NOTE — PROGRESS NOTES
Problem: Self Care Deficits Care Plan (Adult) Goal: *Acute Goals and Plan of Care (Insert Text) Initial Occupational Therapy Goals (1/15/2019) Within 7 day(s): 1. Patient will perform self-feeding/grooming sitting on EOB with Setup A and S for sit balance. 2. Patient will perform UB dressing with Setup-Min A seated EOB for increased independence with ADLs. 3. Patient will perform LB dressing with Mod-Max A & A/E PRN for increased independence with ADLs while adhering to WB status of RLE. 4. Patient will perform all aspects of toileting with Mod-Max A for increased independence with ADLs. 5. Patient will perform LB ADLs utilizing body mechanics & adaptive strategies with 1 verbal cue for increased safety in ADLs. Occupational Therapy TREATMENT Patient: Gilberto Bear (69 y.o. male) Date: 1/16/2019 Diagnosis: Closed right hip fracture (HCC) <principal problem not specified> Procedure(s) (LRB): HIP CLOSED REDUCTION INTERNAL FIXATION, RIGHT HIP, HANA TABLE, C-ARM (Right) 2 Days Post-Op Precautions: Bed Alarm, Fall, PWB(50% WB RLE) Chart, occupational therapy assessment, plan of care, and goals were reviewed. ASSESSMENT: 
Pt was supine with HOB elevated with bed alarm set. Pt was alert and confused. Upon arrival, pt had his gown off but brief was donned. With cueing and encouragement, pt required mod-max A to don gown. It was noted, pt was unable to accurately aim for gown sleeve with his RUE. Placed a wet wash cloth in pt's R hand with cueing to wash his face but unable to complete on his own. Pt limited with command following at this time. Pt will continue to benefit from skilled OT services to address performance with ADLs in order to return closely to PLOF. Pt was left in supine with bed alarm set and all needs met. EDUCATION  Role of OT, Orientation to self Progression toward goals: 
[]          Improving appropriately and progressing toward goals [x]          Improving slowly and progressing toward goals 
[]          Not making progress toward goals and plan of care will be adjusted PLAN: 
Patient continues to benefit from skilled intervention to address the above impairments. Continue treatment per established plan of care. Discharge Recommendations:  SNF Further Equipment Recommendations for Discharge:  TBD at next level of care SUBJECTIVE:  
Patient stated Wait.  OBJECTIVE DATA SUMMARY: 
  
Cognitive/Behavioral Status: 
Neurologic State: Alert, Confused Orientation Level: Disoriented X4 Cognition: Poor safety awareness, Decreased attention/concentration, Decreased command following Safety/Judgement: Decreased awareness of environment, Decreased awareness of need for assistance, Decreased awareness of need for safety, Lack of insight into deficits Functional Mobility and Transfers for ADLs: 
 Bed Mobility: 
Rolling: Moderate assistance Supine to Sit: Moderate assistance Transfers: 
Sit to Stand: Maximum assistance Balance: 
Sitting: Impaired; With support Sitting - Static: Fair (occasional) Sitting - Dynamic: Poor (constant support) Standing: Impaired; With support Standing - Static: Poor ADL Intervention: 
 Grooming Washing Face: Total assistance (dependent) Cognitive Retraining Safety/Judgement: Decreased awareness of environment;Decreased awareness of need for assistance;Decreased awareness of need for safety; Lack of insight into deficits Pain: 
Pre-treatment: when asked if pt was in pain-pt said no. Post-treatment: Did not appear to be in any pain Activity Tolerance:   
Pt overall tolerated session well with no signs of fatigue or distress. Please refer to the flowsheet for vital signs taken during this treatment. After treatment:  
[]  Patient left in no apparent distress sitting up in chair 
[x]  Patient left in no apparent distress in bed 
[x]  Call bell left within reach [x]  Nursing notified []  Caregiver present [x]  Bed alarm activated Bradley Mtz MS, OTR/L Time Calculation: 8 mins

## 2019-01-17 VITALS
SYSTOLIC BLOOD PRESSURE: 176 MMHG | RESPIRATION RATE: 15 BRPM | BODY MASS INDEX: 26.13 KG/M2 | WEIGHT: 172.4 LBS | HEIGHT: 68 IN | TEMPERATURE: 97.7 F | OXYGEN SATURATION: 97 % | HEART RATE: 81 BPM | DIASTOLIC BLOOD PRESSURE: 88 MMHG

## 2019-01-17 PROCEDURE — 97116 GAIT TRAINING THERAPY: CPT

## 2019-01-17 PROCEDURE — 97530 THERAPEUTIC ACTIVITIES: CPT

## 2019-01-17 PROCEDURE — 74011000250 HC RX REV CODE- 250: Performed by: HOSPITALIST

## 2019-01-17 PROCEDURE — 74011250636 HC RX REV CODE- 250/636: Performed by: HOSPITALIST

## 2019-01-17 PROCEDURE — 74011250637 HC RX REV CODE- 250/637: Performed by: PHYSICIAN ASSISTANT

## 2019-01-17 PROCEDURE — 77030037875 HC DRSG MEPILEX <16IN BORD MOLN -A

## 2019-01-17 PROCEDURE — 74011250637 HC RX REV CODE- 250/637: Performed by: HOSPITALIST

## 2019-01-17 RX ORDER — ACETAMINOPHEN 325 MG/1
650 TABLET ORAL
Qty: 30 TAB | Refills: 0 | Status: SHIPPED
Start: 2019-01-17

## 2019-01-17 RX ORDER — ENOXAPARIN SODIUM 100 MG/ML
40 INJECTION SUBCUTANEOUS EVERY 24 HOURS
Qty: 30 SYRINGE | Refills: 0 | Status: SHIPPED
Start: 2019-01-18 | End: 2019-02-05

## 2019-01-17 RX ADMIN — RANITIDINE 150 MG: 150 TABLET ORAL at 08:38

## 2019-01-17 RX ADMIN — ENOXAPARIN SODIUM 40 MG: 40 INJECTION SUBCUTANEOUS at 12:34

## 2019-01-17 RX ADMIN — ENTACAPONE 200 MG: 200 TABLET, FILM COATED ORAL at 08:38

## 2019-01-17 RX ADMIN — CARBIDOPA AND LEVODOPA 1.5 TABLET: 25; 100 TABLET ORAL at 12:34

## 2019-01-17 RX ADMIN — ACETAMINOPHEN 650 MG: 325 TABLET ORAL at 08:37

## 2019-01-17 RX ADMIN — SERTRALINE HYDROCHLORIDE 50 MG: 50 TABLET ORAL at 08:38

## 2019-01-17 RX ADMIN — Medication 10 ML: at 08:39

## 2019-01-17 RX ADMIN — PANTOPRAZOLE SODIUM 40 MG: 40 TABLET, DELAYED RELEASE ORAL at 08:38

## 2019-01-17 RX ADMIN — CARBIDOPA AND LEVODOPA 0.5 TABLET: 25; 100 TABLET ORAL at 08:38

## 2019-01-17 RX ADMIN — ENTACAPONE 200 MG: 200 TABLET, FILM COATED ORAL at 12:34

## 2019-01-17 RX ADMIN — CARBIDOPA AND LEVODOPA 0.5 TABLET: 25; 100 TABLET ORAL at 12:34

## 2019-01-17 RX ADMIN — MIRABEGRON 50 MG: 25 TABLET, FILM COATED, EXTENDED RELEASE ORAL at 08:38

## 2019-01-17 RX ADMIN — CARBIDOPA AND LEVODOPA 1.5 TABLET: 25; 100 TABLET ORAL at 08:39

## 2019-01-17 RX ADMIN — CYCLOSPORINE 1 DROP: 0.5 EMULSION OPHTHALMIC at 13:25

## 2019-01-17 NOTE — PROGRESS NOTES
D/C Plan: [facility] 1/17/19 CM met with pt/family at bedside to discuss transition of care. Pt's daughter would like to have pt transition to [facility] for continued SNF rehab. CMS has been notified to assist.  Please arrange medical transport for 3:00pm to [facility] at [address]. [phone] for report. Please include all hard scripts for controlled substances, med rec and dc summary in packet. Please medicate for pain prior to dc if possible and needed to help offset delay when patient first arrives to facility. Pt's daughter, Keyana Stacy ([phone]), is aware and in agreement. No other transition of care needs have been identified. 1430: Notified by staff that pt's daughter has concerns with pt transitioning to SNF and not being able to follow up with his neurologist [facility]) until he is discharged from rehab. Pt's daughter has been provided a GERONIMO earlier today and has been notified of the pt's rights to include appeal process. CM attempted to call pt's daughter to further discuss and left a message. 5: 
Pt's daughter contacted CM to discuss concerns. Pt's daughter has concerns with pt not being able to follow up with his neurologist until after pt compleats rehab. CM suggested she ask pt's neurologist contact the provider at [facility] or ask the provider at [facility] to contact the pt's neurologist to collaborate regarding pt's parkinson's disease. Pt's daughter requested that her concerns be noted and is agreeable with pt being transitioned to [facility] today. Medical transport has been rescheduled and pt will transition to [facility] today. No other transition of care needs have been identified. Care Management Interventions PCP Verified by CM: Yes Transition of Care Consult (CM Consult): Discharge Planning, SNF Current Support Network: [facility](was at [place] [address] for rehab) Confirm Follow Up Transport: Family Plan discussed with Pt/Family/Caregiver: Yes Freedom of Choice Offered: Yes Discharge Location Discharge Placement: Skilled nursing facility

## 2019-01-17 NOTE — PROGRESS NOTES
Progress Note Patient: Michele Plasencia MRN: [id number]  SSN: [id number] YOB: 1935  Age: 82 y.o. Sex: male POD:    3 Days Post-Op S/P:    Procedure(s): HIP CLOSED REDUCTION INTERNAL FIXATION, RIGHT HIP, HANA TABLE, C-ARM Subjective:  
Pt is without complaints of pain. Patient is incoherent this morning when talking but states that \"he wants to go home. \" 
 
 Objective:  
 
Patient Vitals for the past 12 hrs: 
 BP Temp Pulse Resp SpO2 Weight 01/16/19 2303 113/64 98.2 °F (36.8 °C) 71 16 96 % 78.2 kg (172 lb 6.4 oz) Recent Labs  
  01/16/19 
0345 HGB 12.4*  
HCT 38.4   
K 4.7 * CO2 28 BUN 15  
CREA 0.76 * Pt. resting in bed. Lower extremity operative dressing clean/dry/intact. Neurovascularly intact. Calves soft, nontender. Assessment:  
 
Awake S/P R hip closed reduction internal fixation Plan: 1. Continue pain management/ ice to operative area. 2. Continue to progress with PT/OT. Ideally, we would like patient to be 50% weight bearing on his right leg. However, being that the patient has some cognitive impairment this may not be possible. We would like the patient to protect the weight bearing on his right leg as much as possible--if he is unable to adhere to 50% weight bearing, he may put slightly more pressure on that leg if it means he is able to walk. Patient is also to continue use of walker.  
3. Discharge planning to skilled nursing facility vs. inpatient rehab when cleared by primary team. 
4. DVT prophylaxis by primary team.

## 2019-01-17 NOTE — ROUTINE PROCESS
Bedside and Verbal shift change report given to MACKENZIE Holman RN  (oncoming nurse) by  LETY Cruz RN  (offgoing nurse). Report included the following information SBAR, Kardex, Recent Results and Med Rec Status.

## 2019-01-17 NOTE — PROGRESS NOTES
Hospitalist Progress Note Patient: Bettina Urban MRN: [id number]  CSN: [id number] YOB: 1935  Age: 82 y.o. Sex: male DOA: 1/12/2019 LOS:  LOS: 5 days Assessment/Plan Patient Active Problem List  
Diagnosis Code  Parkinson disease ([facility] [address] G20  
 Closed right hip fracture ([facility] [address] S72.001A  GERD (gastroesophageal reflux disease) K21.9  Falls W19. [facility]  
  
 
83 yo male admitted for fall and right hip fracture 
 
pleasantly demented, no acute events overnight. Left hip fracture - s/p multiple cannulated screws right hip. PT/OT per orthopedics. parkinsons disease - continue with home sinemet.  
  
GERD - continue with PPI. Dementia 
  
Fall precautions  
  
DVT prophylaxis, Disposition : 1-2 to rehab/SNF Review of systems General: No fevers or chills. Cardiovascular: No chest pain or pressure. No palpitations. Pulmonary: No shortness of breath. Gastrointestinal: No nausea, vomiting. Physical Exam: 
General: Awake, cooperative, no acute distress   
HEENT: NC, Atraumatic. PERRLA, anicteric sclerae. Lungs: CTA Bilaterally. No Wheezing/Rhonchi/Rales. Heart:  S1 S2,  No murmur, No Rubs, No Gallops Abdomen: Soft, Non distended, Non tender.  +Bowel sounds, Extremities: No c/c/e Psych:   Not anxious or agitated. Neurologic:  No acute neurological deficit. Vital signs/Intake and Output: 
Visit Vitals /88 Pulse 81 Temp 97.7 °F (36.5 °C) Resp 15 Ht 5' 8\" (1.727 m) Wt 78.2 kg (172 lb 6.4 oz) SpO2 97% BMI 26.21 kg/m² Current Shift:  01/17 0701 - 01/17 1900 In: 240 [P.O.:240] Out: - Last three shifts:  01/15 1901 - 01/17 0700 In: 5 [P.O.:420] Out: -  
 
 
 
 
 
Labs: Results:  
   
Chemistry Recent Labs  
  01/16/19 
0345 01/15/19 
0315 01/14/19 
1948 * 105* 176*  144 143  
K 4.7 3.8 3.9 * 109* 109* CO2 28 29 29 BUN 15 15 15 CREA 0.76 0.82 0.91  
CA 7.8* 8.2* 7.8*  
 AGAP 5 6 5 BUCR 20 18 16 CBC w/Diff Recent Labs  
  01/16/19 
0345 01/15/19 
0315 01/14/19 
1948 WBC  --  7.3  --   
RBC  --  4.38*  --   
HGB 12.4* 13.3 13.2 HCT 38.4 41.0 40.8 PLT  --  178  --   
  
Cardiac Enzymes No results for input(s): CPK, CKND1, RUBIA in the last 72 hours. No lab exists for component: Eran Reddy Coagulation No results for input(s): PTP, INR, APTT in the last 72 hours. No lab exists for component: INREXT, INREXT Lipid Panel No results found for: CHOL, CHOLPOCT, CHOLX, CHLST, CHOLV, 326489, HDL, LDL, LDLC, DLDLP, 373710, VLDLC, VLDL, TGLX, TRIGL, TRIGP, TGLPOCT, CHHD, CHHDX  
BNP No results for input(s): BNPP in the last 72 hours. Liver Enzymes No results for input(s): TP, ALB, TBIL, AP, SGOT, GPT in the last 72 hours. No lab exists for component: DBIL Thyroid Studies No results found for: T4, T3U, TSH, TSHEXT, TSHEXT Procedures/imaging: see electronic medical records for all procedures/Xrays and details which were not copied into this note but were reviewed prior to creation of Plan

## 2019-01-17 NOTE — DISCHARGE SUMMARY
Discharge Summary    Patient: Jan Bowers MRN: [id number]  CSN: [id number]    YOB: 1935  Age: 82 y.o. Sex: male    DOA: 1/12/2019 LOS:  LOS: 5 days   Discharge Date:      Primary Care Provider:  Paul Coleman DO    Admission Diagnoses: Closed right hip fracture [facility])    Discharge Diagnoses:    Problem List as of 1/17/2019 Date Reviewed: 1/15/2019          Codes Class Noted - Resolved    Closed right hip fracture ([facility]) ICD-10-CM: S72.001A  ICD-9-CM: 820.8  1/12/2019 - Present        GERD (gastroesophageal reflux disease) ICD-10-CM: K21.9  ICD-9-CM: 530.81  Unknown - Present        Falls ICD-10-CM: W19. Muna Gonzalez  ICD-9-CM: E888.9  Unknown - Present        Parkinson disease ([facility]) ICD-10-CM: G20  ICD-9-CM: 332.0  12/19/2018 - Present              Discharge Medications:     Current Discharge Medication List      START taking these medications    Details   acetaminophen (TYLENOL) 325 mg tablet Take 2 Tabs by mouth every six (6) hours as needed. Qty: 30 Tab, Refills: 0      enoxaparin (LOVENOX) 40 mg/0.4 mL 0.4 mL by SubCUTAneous route every twenty-four (24) hours for 30 days. Qty: 30 Syringe, Refills: 0         CONTINUE these medications which have NOT CHANGED    Details   aspirin delayed-release 325 mg tablet Take 1 Tab by mouth two (2) times a day. Qty: 60 Tab, Refills: 0      pantoprazole (PROTONIX) 40 mg tablet Take 40 mg by mouth daily. rasagiline (AZILECT) 1 mg tablet Take 1 mg by mouth nightly. metaxalone (SKELAXIN) 400 mg tablet Take 1 Tab by mouth three (3) times daily as needed. Qty: 12 Tab, Refills: 0      carbidopa-levodopa (SINEMET)  mg per tablet Take 0.5 Tabs by mouth four (4) times daily. carbidopa-levodopa-entacapone (STALEVO) 37.5-150-200 mg tablet Take 1 Tab by mouth Before breakfast, lunch, dinner and at bedtime. cycloSPORINE (RESTASIS) 0.05 % dpet Administer 1 Drop to both eyes every twelve (12) hours.       mirabegron ER (MYRBETRIQ) 50 mg ER tablet Take 50 mg by mouth daily. sertraline (ZOLOFT) 50 mg tablet Take 50 mg by mouth daily. STOP taking these medications       oxyCODONE-acetaminophen (PERCOCET) 5-325 mg per tablet Comments:   Reason for Stopping:         raNITIdine hcl 150 mg capsule Comments:   Reason for Stopping:               Discharge Condition: Good    Procedures : Multiple cannulated screws right hip. Consults: Orthopedics      PHYSICAL EXAM   Visit Vitals  /88   Pulse 81   Temp 97.7 °F (36.5 °C)   Resp 15   Ht 5' 8\" (1.727 m)   Wt 78.2 kg (172 lb 6.4 oz)   SpO2 97%   BMI 26.21 kg/m²     General: Awake, cooperative, no acute distress    HEENT: NC, Atraumatic. PERRLA, EOMI. Anicteric sclerae. Lungs:  CTA Bilaterally. No Wheezing/Rhonchi/Rales. Heart:  Regular  rhythm,  No murmur, No Rubs, No Gallops  Abdomen: Soft, Non distended, Non tender. +Bowel sounds,   Extremities: No c/c/e  Psych:   Not anxious or agitated. Neurologic:  No acute neurological deficits. Resting tremors                                    Admission HPI : Randal Clark is a 82 y.o. male who has past history of parkinsons, prostate ca, right hip fracture s/p IM nailing 12/2018, GERD presents to ER s/p fall. Patient has dementia and not able to provide history, no faimly at bedside. He is brought from [facility] s/p multiple falls. He has been complaining of left hip pain. In ER x-ray showed left hip fracture        Hospital Course :   Mr. Yan Cast admitted to medical floor, he was seen and followed by orthopedics. His latoya and post op period uncomplicated. He is pleasantly demented. He had no acute events during hospitalization except for some confusion post op that resolved. Left hip fracture - s/p multiple cannulated screws right hip. PT/OT per orthopedics. He received PT/OT per orthopedics who recommended 50% weight bearing on right leg.  However given patient's dementia this is not possible with patient and they recommended PT try with patient as much as they can.      parkinsons disease - continued with home sinemet.      GERD - continued with PPI.     He will be discharge to rehab/SNF      Activity: Activity as tolerated    Diet: Regular Diet    Follow-up: PCP, orthpedics and he needs to see his neurologist as soon as possible. Disposition: rehab/SNF    Minutes spent on discharge: 45       Labs: Results:       Chemistry Recent Labs     01/16/19  0345 01/15/19  0315 01/14/19  1948   * 105* 176*    144 143   K 4.7 3.8 3.9   * 109* 109*   CO2 28 29 29   BUN 15 15 15   CREA 0.76 0.82 0.91   CA 7.8* 8.2* 7.8*   AGAP 5 6 5   BUCR 20 18 16      CBC w/Diff Recent Labs     01/16/19  0345 01/15/19  0315 01/14/19  1948   WBC  --  7.3  --    RBC  --  4.38*  --    HGB 12.4* 13.3 13.2   HCT 38.4 41.0 40.8   PLT  --  178  --       Cardiac Enzymes No results for input(s): CPK, CKND1, RUBIA in the last 72 hours. No lab exists for component: CKRMB, TROIP   Coagulation No results for input(s): PTP, INR, APTT in the last 72 hours. No lab exists for component: INREXT    Lipid Panel No results found for: CHOL, CHOLPOCT, CHOLX, CHLST, CHOLV, 389298, HDL, LDL, LDLC, DLDLP, 031989, VLDLC, VLDL, TGLX, TRIGL, TRIGP, TGLPOCT, CHHD, CHHDX   BNP No results for input(s): BNPP in the last 72 hours. Liver Enzymes No results for input(s): TP, ALB, TBIL, AP, SGOT, GPT in the last 72 hours. No lab exists for component: DBIL   Thyroid Studies No results found for: T4, T3U, TSH, TSHEXT         Significant Diagnostic Studies: Xr Hip Rt W Or Wo Pelv 2-3 Vws    Result Date: 1/12/2019  EXAM:  XR HIP RT W OR WO PELV 2-3 VWS CLINICAL HISTORY/INDICATION:  Ortho request -Additional:  Right hip fracture COMPARISON:  12/17/18, 12/08/18 TECHNIQUE:  2 views of the pelvis and right hip were performed.  _______________ FINDINGS:  The femoral heads are normally located bilaterally.  There is contour irregularity involving the right femoral neck correlating with the fracture seen on recent hip CT. There are 4 fixation screws involving the left proximal femur. There is a fracture of the left femoral neck present which is not significantly displaced. Radiation seed implants are present involving the prostate gland. Lumbar spondylosis is present. _______________     IMPRESSION: Mild irregularity of the right femoral neck is present compatible with the known fracture demonstrated on recent hip CT. Ct Head Wo Cont    Result Date: 12/19/2018  EXAM: CT HEAD, WITHOUT IV CONTRAST COMPARISON: Head CT 12/8/2018 INDICATION: Altered level of consciousness, postop TECHNIQUE: Multiple axial CT images of the head were obtained extending from the skull base through the vertex. Additional coronal and sagittal reformations were also performed. One or more dose reduction techniques were used on this CT: automated exposure control, adjustment of the mAs and/or kVp according to patient size, and iterative reconstruction techniques. The specific techniques used on this CT exam have been documented in the patient's electronic medical record. _______________ FINDINGS: BRAIN AND POSTERIOR FOSSA: There is generalized prominence of the ventricular system, associated with proportional widening of the cortical cerebral sulci, compatible with generalized volume loss. Hazy hypoattenuation identified along the periventricular white matter, a nonspecific finding which is most commonly encountered in the setting of chronic microvascular disease. There is no intracranial hemorrhage, mass effect, or midline shift. There are no significant additional areas of abnormal parenchymal attenuation. EXTRA-AXIAL SPACES AND MENINGES: There are no abnormal extra-axial fluid collections. CALVARIUM: No acute osseous abnormality. OTHER: The visualized portions of the paranasal sinuses and mastoid air cells are clear. _______________     IMPRESSION: 1.   No CT evidence of an acute intracranial abnormality - in particular, no acute cortical infarct, hemorrhage, or mass effect. CT is known to be relatively insensitive to acute ischemia in the first 24-48 hours and MRI may be useful if clinically indicated. 2.  Unchanged, mild cerebral atrophy/volume loss and periventricular white matter changes, most commonly seen with chronic microvascular disease. Ct Hips Bi Wo Cont    Result Date: 1/12/2019  EXAM:  CT HIPS BI WO CONT CLINICAL HISTORY/INDICATION:  Hip pain, acute, fx suspect, xray negative or indeterminate -Additional:  Status post fall COMPARISON:  Plain films from 12/17/18 TECHNIQUE:  A CT scan of the right hip was performed. Images were obtained from the iliac crest through the pubic symphysis. Sagittal and coronal reconstructions were performed to better evaluate the osseous anatomy. One or more dose reduction techniques were used on this CT: automated exposure control, adjustment of the mAs and/or kVp according to patient size, and iterative reconstruction techniques. The specific techniques used on this CT exam have been documented in the patient's electronic medical record. Digital Imaging and Communications in Medicine (DICOM) format image data are available to nonaffiliated external healthcare facilities or entities on a secure, media free, reciprocally searchable basis with patient authorization for at least a 12-month period after this study. _______________ FINDINGS: Osseous: The femoral heads are normally located within the acetabula. Bilateral hip degenerative changes are present. Screws are seen within the proximal left femur. These were placed at the site of a prior femoral neck fracture. This femoral neck fracture is again identified. No significant displacement or angulation is present. There is an acute fracture present involving the right femoral neck. Lumbar spondylosis is present. Lumbar spine stenosis is present involving the visualized lower lumbar spine.  Pelvic Organs:  Seed implants are present within the prostate gland. GI Tract:  No obstruction or ileus Lymph Nodes:  No enlarged nodes Vasculature: Atherosclerosis Other:  Mild soft tissue swelling is evident adjacent to the iliopsoas tendon _______________     IMPRESSION: There are 4 screws present within the left femoral neck at the site of a femoral neck fracture. This femoral neck fracture is identified. No significant angulation is present. There is an acute fracture involving the neck of the right femur Lumbar spondylosis with lumbar spinal stenosis CT is approximately 86% sensitive for evaluating acute nondisplaced hip fractures. When CT detects no fracture and there is persistent clinical suspicion of an occult fracture, MRI is useful for more sensitive evaluation. Xr Chest Port    Result Date: 1/12/2019  EXAM: Portable Chest CLINICAL INDICATION:  pre op -Additional:  Right hip fracture 1141 ______________ FINDINGS: HEART AND MEDIASTINUM:  The heart size is normal. LUNGS AND AIRWAYS:  The lungs are clear. PLEURA:  No pleural effusion or pneumothorax. BONES:  No acute or aggressive osseous lesions. OTHER:  None. ______________     IMPRESSION: No active cardiopulmonary disease. [facility] [employer] Service    Result Date: 1/16/2019  See impression. Impression:  Fluoroscopy was provided for this procedure under the supervision and/or direction of the attending provider. ? For further information regarding this procedure, see patient medical record. Xr Hip Rt W Or Wo Pelv  1 Vw    Result Date: 1/14/2019  EXAM: AP right hip, one view INDICATION: Right hip fracture. Postop. COMPARISON: 1/12/2019 _______________ FINDINGS: 4 cannulated screws pass from the lateral subtrochanteric region across the femoral neck into the femoral head. No displaced fracture appreciated. No angulation.  Arterial vascular calcifications are present in the medial upper leg. _______________     IMPRESSION: Right femoral neck fracture, no angulation or separation status post internal fixation. No results found for this or any previous visit.         CC: Pili Grajeda DO

## 2019-01-17 NOTE — PROGRESS NOTES
3407-received report from A Anthony RN included SBAR MAR and Kardex. 1332-called report to ARIN at [facility] included SBAR MAR and Kardex. 1513-discharged to [facility] via medical transport.

## 2019-01-17 NOTE — PROGRESS NOTES
Shift Summary :  Slept little during the night. Trying to get out bed at times with feet over edge of bed otherwise scooting around in bed. Responds to commands. Dressing to right hip area dry intact with shadowing. Incontinent of urine. Red rash noted over entire back. Though would not eat dinner did eat applesauce and pudding when daughter arrived.

## 2019-01-17 NOTE — PROGRESS NOTES
1615-received report from MICHELE Miller RN included SBAR MAR and Kardex. Bedside and Verbal shift change report given to DEDE Hamm RN (oncoming nurse) by Navneet Chou RN (offgoing nurse). Report included the following information SBAR, Kardex and MAR.

## 2019-01-18 NOTE — PROGRESS NOTES
Problem: Mobility Impaired (Adult and Pediatric) Goal: *Acute Goals and Plan of Care (Insert Text) Physical Therapy Goals Initiated 1/15/2019  to be met within 5-7 days 1. Bed mobility:  Supine to/from sit with S in prep for ADL activity. 2. Transfers:  Sit to/from stand with min/CGA 50% wt bearing R LE in prep for gait. 3. Tolerate sitting up in chair >30min for functional activity performance. 4. Ambulation:  Ambulate 30 ft. with min/CGA/RW 50% wt bearing R LE for increased functional mobility at discharge. Outcome: Not Progressing Towards Goal 
physical Therapy TREATMENT Patient: Jose Arias (79 y.o. male) Date: 1/17/2019 Diagnosis: Closed right hip fracture (HCC) <principal problem not specified> Procedure(s) (LRB): HIP CLOSED REDUCTION INTERNAL FIXATION, RIGHT HIP, HANA TABLE, C-ARM (Right) 4 Days Post-Op Precautions: Bed Alarm, Fall, PWB(50% WB RLE) Chart, physical therapy assessment, plan of care and goals were reviewed. ASSESSMENT: 
Pt is slightly more appropriate today and was able to complete 4 standing trials. Pt remains unable to safely comprehend RW use, or improve UE WB. Pt was pleasant throughout session, but there will not likely be progress in ambulation, until WB restriction is decreased (Pt's mental situation will not allow for controled limiting of R WB. Progression toward goals: 
[]      Improving appropriately and progressing toward goals 
[]      Improving slowly and progressing toward goals [x]      Not making progress toward goals and plan of care will be adjusted PLAN: 
Patient continues to benefit from skilled intervention to address the above impairments. Continue treatment per established plan of care. Discharge Recommendations:  Gino Guajardo Further Equipment Recommendations for Discharge:  rolling walker SUBJECTIVE:  
Patient stated Brittani Medina. Who are you with? OBJECTIVE DATA SUMMARY:  
Critical Behavior: Neurologic State: Confused Orientation Level: Oriented to person Cognition: Poor safety awareness, Memory loss, No command following Safety/Judgement: Decreased awareness of environment, Decreased awareness of need for assistance, Decreased awareness of need for safety, Lack of insight into deficits Functional Mobility Training: 
Bed Mobility: 
Rolling: Moderate assistance Supine to Sit: Moderate assistance Sit to Supine: Maximum assistance Scooting: Moderate assistance Transfers: 
Sit to Stand: Moderate assistance, Maximum assistance Stand to Sit: Moderate assistance, Maximum assistance Balance: 
Sitting: Intact Sitting - Static: Good (unsupported) Sitting - Dynamic: Fair (occasional) Standing: Impaired, With support Standing - Static: Poor Standing - Dynamic : PoorAmbulation/Gait Training: 
Gait Description (WDL): (unsafe to assess) Therapeutic Exercises: PROM of R LE 
Pain: 
Pain Scale 1: Adult Nonverbal Pain Scale Pain Intensity 1: 0 Pain out: 0 Pain Location 1: Hip Pain Orientation 1: Right Pain Intervention(s) 1: Ice, Position Activity Tolerance:  
Fair- 
Please refer to the flowsheet for vital signs taken during this treatment. After treatment:  
[] Patient left in no apparent distress sitting up in chair 
[x] Patient left in no apparent distress in bed 
[x] Call bell left within reach [x] Nursing notified 
[x] Caregiver present 
[] Bed alarm activated Paul Laguna PTA Time Calculation: 30 mins

## 2019-01-23 ENCOUNTER — PATIENT OUTREACH (OUTPATIENT)
Dept: CASE MANAGEMENT | Age: 84
End: 2019-01-23

## 2019-01-30 ENCOUNTER — PATIENT OUTREACH (OUTPATIENT)
Dept: CASE MANAGEMENT | Age: 84
End: 2019-01-30

## 2019-02-03 ENCOUNTER — APPOINTMENT (OUTPATIENT)
Dept: CT IMAGING | Age: 84
End: 2019-02-03
Attending: PHYSICIAN ASSISTANT
Payer: MEDICARE

## 2019-02-03 ENCOUNTER — HOSPITAL ENCOUNTER (OUTPATIENT)
Age: 84
Setting detail: OBSERVATION
Discharge: NURSING, REHAB OR LONG-TERM CARE | End: 2019-02-05
Attending: EMERGENCY MEDICINE | Admitting: HOSPITALIST
Payer: MEDICARE

## 2019-02-03 ENCOUNTER — APPOINTMENT (OUTPATIENT)
Dept: GENERAL RADIOLOGY | Age: 84
End: 2019-02-03
Attending: PHYSICIAN ASSISTANT
Payer: MEDICARE
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
## 2019-02-03 DIAGNOSIS — S22.42XA CLOSED FRACTURE OF MULTIPLE RIBS OF LEFT SIDE, INITIAL ENCOUNTER: Primary | ICD-10-CM

## 2019-02-03 DIAGNOSIS — R52 PAIN: ICD-10-CM

## 2019-02-03 PROBLEM — S22.49XA MULTIPLE RIB FRACTURES: Status: ACTIVE | Noted: 2019-02-03

## 2019-02-03 PROBLEM — G20 PARKINSON'S DISEASE DEMENTIA (HCC): Status: ACTIVE | Noted: 2019-02-03

## 2019-02-03 PROBLEM — F02.80 PARKINSON'S DISEASE DEMENTIA (HCC): Status: ACTIVE | Noted: 2019-02-03

## 2019-02-03 LAB
ALBUMIN SERPL-MCNC: 3.6 G/DL (ref 3.4–5)
ALBUMIN/GLOB SERPL: 1.1 {RATIO} (ref 0.8–1.7)
ALP SERPL-CCNC: 156 U/L (ref 45–117)
ALT SERPL-CCNC: 9 U/L (ref 16–61)
ANION GAP SERPL CALC-SCNC: 5 MMOL/L (ref 3–18)
APPEARANCE UR: ABNORMAL
AST SERPL-CCNC: 15 U/L (ref 15–37)
BACTERIA URNS QL MICRO: ABNORMAL /HPF
BASOPHILS # BLD: 0 K/UL (ref 0–0.1)
BASOPHILS NFR BLD: 0 % (ref 0–2)
BILIRUB SERPL-MCNC: 0.4 MG/DL (ref 0.2–1)
BILIRUB UR QL: NEGATIVE
BUN SERPL-MCNC: 17 MG/DL (ref 7–18)
BUN/CREAT SERPL: 21 (ref 12–20)
CALCIUM SERPL-MCNC: 8.5 MG/DL (ref 8.5–10.1)
CHLORIDE SERPL-SCNC: 107 MMOL/L (ref 100–108)
CK SERPL-CCNC: 109 U/L (ref 39–308)
CO2 SERPL-SCNC: 31 MMOL/L (ref 21–32)
COLOR UR: ABNORMAL
CREAT SERPL-MCNC: 0.82 MG/DL (ref 0.6–1.3)
DIFFERENTIAL METHOD BLD: ABNORMAL
EOSINOPHIL # BLD: 0.2 K/UL (ref 0–0.4)
EOSINOPHIL NFR BLD: 2 % (ref 0–5)
EPITH CASTS URNS QL MICRO: NEGATIVE /LPF (ref 0–5)
ERYTHROCYTE [DISTWIDTH] IN BLOOD BY AUTOMATED COUNT: 12.9 % (ref 11.6–14.5)
GLOBULIN SER CALC-MCNC: 3.2 G/DL (ref 2–4)
GLUCOSE SERPL-MCNC: 102 MG/DL (ref 74–99)
GLUCOSE UR STRIP.AUTO-MCNC: NEGATIVE MG/DL
HCT VFR BLD AUTO: 42.2 % (ref 36–48)
HGB BLD-MCNC: 13.5 G/DL (ref 13–16)
HGB UR QL STRIP: ABNORMAL
KETONES UR QL STRIP.AUTO: ABNORMAL MG/DL
LEUKOCYTE ESTERASE UR QL STRIP.AUTO: NEGATIVE
LYMPHOCYTES # BLD: 0.9 K/UL (ref 0.9–3.6)
LYMPHOCYTES NFR BLD: 10 % (ref 21–52)
MCH RBC QN AUTO: 30.2 PG (ref 24–34)
MCHC RBC AUTO-ENTMCNC: 32 G/DL (ref 31–37)
MCV RBC AUTO: 94.4 FL (ref 74–97)
MONOCYTES # BLD: 0.8 K/UL (ref 0.05–1.2)
MONOCYTES NFR BLD: 8 % (ref 3–10)
NEUTS SEG # BLD: 7.5 K/UL (ref 1.8–8)
NEUTS SEG NFR BLD: 80 % (ref 40–73)
NITRITE UR QL STRIP.AUTO: NEGATIVE
PH UR STRIP: 7.5 [PH] (ref 5–8)
PLATELET # BLD AUTO: 246 K/UL (ref 135–420)
PMV BLD AUTO: 10.4 FL (ref 9.2–11.8)
POTASSIUM SERPL-SCNC: 3.9 MMOL/L (ref 3.5–5.5)
PROT SERPL-MCNC: 6.8 G/DL (ref 6.4–8.2)
PROT UR STRIP-MCNC: NEGATIVE MG/DL
RBC # BLD AUTO: 4.47 M/UL (ref 4.7–5.5)
RBC #/AREA URNS HPF: ABNORMAL /HPF (ref 0–5)
SODIUM SERPL-SCNC: 143 MMOL/L (ref 136–145)
SP GR UR REFRACTOMETRY: 1.02 (ref 1–1.03)
UROBILINOGEN UR QL STRIP.AUTO: 1 EU/DL (ref 0.2–1)
WBC # BLD AUTO: 9.4 K/UL (ref 4.6–13.2)
WBC URNS QL MICRO: NEGATIVE /HPF (ref 0–5)

## 2019-02-03 PROCEDURE — 80053 COMPREHEN METABOLIC PANEL: CPT

## 2019-02-03 PROCEDURE — 81001 URINALYSIS AUTO W/SCOPE: CPT

## 2019-02-03 PROCEDURE — 65270000029 HC RM PRIVATE

## 2019-02-03 PROCEDURE — 74011250637 HC RX REV CODE- 250/637: Performed by: PHYSICIAN ASSISTANT

## 2019-02-03 PROCEDURE — 85025 COMPLETE CBC W/AUTO DIFF WBC: CPT

## 2019-02-03 PROCEDURE — 82550 ASSAY OF CK (CPK): CPT

## 2019-02-03 PROCEDURE — 99284 EMERGENCY DEPT VISIT MOD MDM: CPT

## 2019-02-03 PROCEDURE — 71250 CT THORAX DX C-: CPT

## 2019-02-03 PROCEDURE — 71045 X-RAY EXAM CHEST 1 VIEW: CPT

## 2019-02-03 PROCEDURE — 70450 CT HEAD/BRAIN W/O DYE: CPT

## 2019-02-03 PROCEDURE — 72125 CT NECK SPINE W/O DYE: CPT

## 2019-02-03 PROCEDURE — 73502 X-RAY EXAM HIP UNI 2-3 VIEWS: CPT

## 2019-02-03 PROCEDURE — 65390000012 HC CONDITION CODE 44 OBSERVATION

## 2019-02-03 PROCEDURE — 87086 URINE CULTURE/COLONY COUNT: CPT

## 2019-02-03 PROCEDURE — 73080 X-RAY EXAM OF ELBOW: CPT

## 2019-02-03 RX ORDER — PANTOPRAZOLE SODIUM 40 MG/1
40 TABLET, DELAYED RELEASE ORAL DAILY
Status: DISCONTINUED | OUTPATIENT
Start: 2019-02-04 | End: 2019-02-05 | Stop reason: HOSPADM

## 2019-02-03 RX ORDER — ENOXAPARIN SODIUM 100 MG/ML
40 INJECTION SUBCUTANEOUS EVERY 24 HOURS
Status: DISCONTINUED | OUTPATIENT
Start: 2019-02-03 | End: 2019-02-05 | Stop reason: HOSPADM

## 2019-02-03 RX ORDER — ACETAMINOPHEN 325 MG/1
650 TABLET ORAL
Status: COMPLETED | OUTPATIENT
Start: 2019-02-03 | End: 2019-02-03

## 2019-02-03 RX ORDER — ACETAMINOPHEN 325 MG/1
650 TABLET ORAL
Status: DISCONTINUED | OUTPATIENT
Start: 2019-02-03 | End: 2019-02-05 | Stop reason: HOSPADM

## 2019-02-03 RX ORDER — KETOROLAC TROMETHAMINE 15 MG/ML
15 INJECTION, SOLUTION INTRAMUSCULAR; INTRAVENOUS
Status: DISCONTINUED | OUTPATIENT
Start: 2019-02-03 | End: 2019-02-05 | Stop reason: HOSPADM

## 2019-02-03 RX ORDER — ASPIRIN 325 MG
325 TABLET, DELAYED RELEASE (ENTERIC COATED) ORAL 2 TIMES DAILY
Status: DISCONTINUED | OUTPATIENT
Start: 2019-02-03 | End: 2019-02-05 | Stop reason: HOSPADM

## 2019-02-03 RX ORDER — RASAGILINE 1 MG/1
1 TABLET ORAL
Status: DISCONTINUED | OUTPATIENT
Start: 2019-02-03 | End: 2019-02-05 | Stop reason: HOSPADM

## 2019-02-03 RX ORDER — CARBIDOPA, LEVODOPA AND ENTACAPONE 37.5; 200; 15 MG/1; MG/1; MG/1
1 TABLET, FILM COATED ORAL
Status: DISCONTINUED | OUTPATIENT
Start: 2019-02-04 | End: 2019-02-05 | Stop reason: HOSPADM

## 2019-02-03 RX ORDER — CYCLOSPORINE 0.5 MG/ML
1 EMULSION OPHTHALMIC EVERY 12 HOURS
Status: DISCONTINUED | OUTPATIENT
Start: 2019-02-03 | End: 2019-02-05 | Stop reason: HOSPADM

## 2019-02-03 RX ORDER — CARBIDOPA AND LEVODOPA 25; 100 MG/1; MG/1
0.5 TABLET ORAL 4 TIMES DAILY
Status: DISCONTINUED | OUTPATIENT
Start: 2019-02-03 | End: 2019-02-05 | Stop reason: HOSPADM

## 2019-02-03 RX ORDER — LIDOCAINE 4 G/100G
1 PATCH TOPICAL EVERY 24 HOURS
Status: DISCONTINUED | OUTPATIENT
Start: 2019-02-03 | End: 2019-02-05 | Stop reason: HOSPADM

## 2019-02-03 RX ORDER — SODIUM CHLORIDE 0.9 % (FLUSH) 0.9 %
5-40 SYRINGE (ML) INJECTION EVERY 8 HOURS
Status: DISCONTINUED | OUTPATIENT
Start: 2019-02-03 | End: 2019-02-05 | Stop reason: HOSPADM

## 2019-02-03 RX ORDER — ONDANSETRON 2 MG/ML
4 INJECTION INTRAMUSCULAR; INTRAVENOUS
Status: DISCONTINUED | OUTPATIENT
Start: 2019-02-03 | End: 2019-02-05 | Stop reason: HOSPADM

## 2019-02-03 RX ORDER — SODIUM CHLORIDE 0.9 % (FLUSH) 0.9 %
5-40 SYRINGE (ML) INJECTION AS NEEDED
Status: DISCONTINUED | OUTPATIENT
Start: 2019-02-03 | End: 2019-02-05 | Stop reason: HOSPADM

## 2019-02-03 RX ORDER — SERTRALINE HYDROCHLORIDE 50 MG/1
50 TABLET, FILM COATED ORAL DAILY
Status: DISCONTINUED | OUTPATIENT
Start: 2019-02-04 | End: 2019-02-05 | Stop reason: HOSPADM

## 2019-02-03 RX ADMIN — ACETAMINOPHEN 650 MG: 325 TABLET ORAL at 20:07

## 2019-02-03 NOTE — ED TRIAGE NOTES
Patient arrived via EMS from [facility] for fall around lunchtime today, patient was found on fall by staff, a/o x3, patient reports left hip pain, patient able to move all left toes,2+ pulse on left foot

## 2019-02-03 NOTE — ED PROVIDER NOTES
EMERGENCY DEPARTMENT HISTORY AND PHYSICAL EXAM 
 
Date: 2/3/2019 Patient Name: Payton Roach History of Presenting Illness Chief Complaint Patient presents with  
 Hip Pain  Fall History Provided By: Patient Chief Complaint: Left-sided rib pain s/p fall Duration: Earlier today (\"lunchtime\") Timing:  Acute Location: Left-sided ribs Severity: 9 out of 10 Associated Symptoms: left-sided lower back pain, left hip pain, and left elbow pain Additional History (Context):  
5:51 PM 
Payton Roach is a 82 y.o. male with PMHX of Parkinson's disease, prostate CA, GERD, depression, and risk for falls who presents to the emergency department via EMS C/O 9/10 left-sided rib pain s/p fall onset earlier today at \"lunchtime. \" Associated sxs include left-sided lower back pain, left hip pain, and left elbow pain. Per EMS, patient fell earlier today (unwitnessed), was found by the staff (discerned to be A&O x3), and reported left hip pain without loss of movement or sensation in his left LE. Pt reports that he slipped on a carpet and hit his elbow on the edge of a door without any head injury; however, he is not a reliable historian (oriented to person and place but inconsistent with time). The patient gestured between his left elbow and left-sided ribs to describe his pain, although he struggled to verbalize and confirm the locations of his pain. Pt takes ASA daily. Pt denies shoulder pain, bilateral LE pain, numbness, and any other sxs or complaints. HPI limited due to patient's dementia PCP: John Leonard DO 
 
Current Facility-Administered Medications Medication Dose Route Frequency Provider Last Rate Last Dose  hydrALAZINE (APRESOLINE) 20 mg/mL injection 20 mg  20 mg IntraVENous Q6H PRN Claris Olszewski, PA-C      
 sodium chloride (NS) flush 5-40 mL  5-40 mL IntraVENous Q8H Claris Olszewski, PA-C   10 mL at 02/04/19 0036  sodium chloride (NS) flush 5-40 mL  5-40 mL IntraVENous PRN Jeri Frank PA-C      
 acetaminophen (TYLENOL) tablet 650 mg  650 mg Oral Q4H PRN Jeri Frank PA-C      
 ondansetron [facility]) injection 4 mg  4 mg IntraVENous Q4H PRN Rito ROBBINS PA-C      
 enoxaparin (LOVENOX) injection 40 mg  40 mg SubCUTAneous Q24H Bennett Villeda PA-C   40 mg at 02/04/19 0037  
 ketorolac (TORADOL) injection 15 mg  15 mg IntraVENous Q6H PRN Jeri Frank PA-C   15 mg at 02/04/19 7255  lidocaine 4 % patch 1 Patch  1 Patch TransDERmal Q24H Jeri Frank PA-C   1 Patch at 02/04/19 1690  aspirin delayed-release tablet 325 mg  325 mg Oral BID Jeri Frank PA-C   325 mg at 02/04/19 3693  carbidopa-levodopa (SINEMET)  mg per tablet 0.5 Tab  0.5 Tab Oral QID Jeri Frank PA-C   0.5 Tab at 02/04/19 3573  carbidopa-levodopa-entacapone (STALEVO) 37.5-150-200 mg tablet 1 Tab- Patient supplied  (Patient Supplied)  1 Tab Oral AC&HS Jeri Frank PA-C      
 cycloSPORINE (RESTASIS) 0.05 % ophthalmic emulsion 1 Drop  1 Drop Both Eyes Q12H Jeri Frank PA-C      
 pantoprazole (PROTONIX) tablet 40 mg  40 mg Oral DAILY Bennett Villeda PA-C      
 rasagiline (AZILECT) tablet 1 mg  1 mg Oral QHS VilledaBennett olson PA-C   1 mg at 02/04/19 1986  sertraline (ZOLOFT) tablet 50 mg  50 mg Oral DAILY Jeri Frank PA-C Past History Past Medical History: 
Past Medical History:  
Diagnosis Date  At risk for falls  Cancer [facility])   
 prostate [address]  GERD (gastroesophageal reflux disease)  Parkinson disease ([facility] [address]  Psychiatric disorder   
 depression Past Surgical History: 
Past Surgical History:  
Procedure Laterality Date  HX CATARACT REMOVAL Bilateral   
 HX HERNIA REPAIR    
 x2  
 HX LAP CHOLECYSTECTOMY  HX VASECTOMY Family History: 
History reviewed. No pertinent family history. Social History: 
Social History Tobacco Use  Smoking status: Never Smoker  Smokeless tobacco: Never Used Substance Use Topics  Alcohol use: No  
  Frequency: Never  Drug use: No  
 
 
Allergies: Allergies Allergen Reactions  Other Medication Other (comments) Daughter asking for no narcotics due to pt's reaction to percocet and states pt has parkinson's.  Percocet [Oxycodone-Acetaminophen] Other (comments) \"confusion\" per daughter Review of Systems Review of Systems Unable to perform ROS: Dementia Gastrointestinal: Negative for abdominal pain. Musculoskeletal: Positive for arthralgias (left elbow, left hip), back pain (left-sided lower back) and myalgias (left-sided rib pain). (-) left shoulder pain, bilateral LE pain Neurological: Negative for numbness and headaches. Physical Exam  
 
Vitals:  
 02/03/19 2115 02/03/19 2358 02/04/19 8367 02/04/19 4733 BP: 159/79 162/82 174/61 Pulse: 75 85 71 Resp: 16 12 18 Temp:  98.4 °F (36.9 °C) 97.7 °F (36.5 °C) SpO2: 97% 98% 100% Weight:    75.5 kg (166 lb 8 oz) Height:    5' 8\" (1.727 m) Physical Exam  
Constitutional: He is oriented to person, place, and time. He appears well-developed and well-nourished. No distress. Lying on stretcher, alert, oriented to person and place but inconsistent with time, this is pt base line HENT:  
Head: Normocephalic and atraumatic. Right Ear: Tympanic membrane, external ear and ear canal normal. Tympanic membrane is not perforated, not erythematous, not retracted and not bulging. Left Ear: Tympanic membrane, external ear and ear canal normal. Tympanic membrane is not perforated, not erythematous, not retracted and not bulging. Nose: Nose normal. No mucosal edema or rhinorrhea. Right sinus exhibits no maxillary sinus tenderness and no frontal sinus tenderness. Left sinus exhibits no maxillary sinus tenderness and no frontal sinus tenderness. Mouth/Throat: Uvula is midline, oropharynx is clear and moist and mucous membranes are normal. Mucous membranes are not dry. No uvula swelling. No oropharyngeal exudate, posterior oropharyngeal edema, posterior oropharyngeal erythema or tonsillar abscesses. No scalp tenderness, swelling or hematomas Eyes: EOM are normal. Pupils are equal, round, and reactive to light. Neck: Normal range of motion. Neck supple. No C spine tenderness, no crepitus or step off   
Cardiovascular: Normal rate, regular rhythm, normal heart sounds and intact distal pulses. No murmur heard. Pulmonary/Chest: Effort normal and breath sounds normal. No respiratory distress. He has no wheezes. He has no rales. Lungs CTA-B Abdominal: Soft. Bowel sounds are normal. He exhibits no distension. There is no tenderness. There is no rebound and no guarding. Musculoskeletal:  
     Left elbow: He exhibits normal range of motion, no swelling, no effusion and no deformity. Tenderness found. Left hip: He exhibits tenderness. He exhibits normal range of motion, no swelling, no crepitus and no deformity. Back: 
 
No midline back pain, FROM  
 
FROM of all extremities Left elbow and hip TTP w/o deformity Otherwise extremities non tender Lymphadenopathy:  
  He has no cervical adenopathy. Neurological: He is alert and oriented to person, place, and time. Skin: Skin is warm and dry. No rash noted. Psychiatric: He has a normal mood and affect. Judgment normal.  
Nursing note and vitals reviewed. Diagnostic Study Results Labs - Recent Results (from the past 12 hour(s)) CBC WITH AUTOMATED DIFF Collection Time: 02/03/19  5:39 PM  
Result Value Ref Range WBC 9.4 4.6 - 13.2 K/uL  
 RBC 4.47 (L) 4.70 - 5.50 M/uL  
 HGB 13.5 13.0 - 16.0 g/dL HCT 42.2 36.0 - 48.0 % MCV 94.4 74.0 - 97.0 FL  
 MCH 30.2 24.0 - 34.0 PG  
 MCHC 32.0 31.0 - 37.0 g/dL  
 RDW 12.9 11.6 - 14.5 % PLATELET 990 529 - 256 K/uL MPV 10.4 9.2 - 11.8 FL  
 NEUTROPHILS 80 (H) 40 - 73 % LYMPHOCYTES 10 (L) 21 - 52 % MONOCYTES 8 3 - 10 % EOSINOPHILS 2 0 - 5 % BASOPHILS 0 0 - 2 %  
 ABS. NEUTROPHILS 7.5 1.8 - 8.0 K/UL  
 ABS. LYMPHOCYTES 0.9 0.9 - 3.6 K/UL  
 ABS. MONOCYTES 0.8 0.05 - 1.2 K/UL  
 ABS. EOSINOPHILS 0.2 0.0 - 0.4 K/UL  
 ABS. BASOPHILS 0.0 0.0 - 0.1 K/UL  
 DF AUTOMATED METABOLIC PANEL, COMPREHENSIVE Collection Time: 02/03/19  5:39 PM  
Result Value Ref Range Sodium 143 136 - 145 mmol/L Potassium 3.9 3.5 - 5.5 mmol/L Chloride 107 100 - 108 mmol/L  
 CO2 31 21 - 32 mmol/L Anion gap 5 3.0 - 18 mmol/L Glucose 102 (H) 74 - 99 mg/dL BUN 17 7.0 - 18 MG/DL Creatinine 0.82 0.6 - 1.3 MG/DL  
 BUN/Creatinine ratio 21 (H) 12 - 20 GFR est AA >60 >60 ml/min/1.73m2 GFR est non-AA >60 >60 ml/min/1.73m2 Calcium 8.5 8.5 - 10.1 MG/DL Bilirubin, total 0.4 0.2 - 1.0 MG/DL  
 ALT (SGPT) 9 (L) 16 - 61 U/L  
 AST (SGOT) 15 15 - 37 U/L Alk. phosphatase 156 (H) 45 - 117 U/L Protein, total 6.8 6.4 - 8.2 g/dL Albumin 3.6 3.4 - 5.0 g/dL Globulin 3.2 2.0 - 4.0 g/dL A-G Ratio 1.1 0.8 - 1.7 CK Collection Time: 02/03/19  5:39 PM  
Result Value Ref Range  39 - 308 U/L  
URINALYSIS W/ RFLX MICROSCOPIC Collection Time: 02/03/19  8:44 PM  
Result Value Ref Range Color DARK YELLOW Appearance TURBID Specific gravity 1.020 1.005 - 1.030    
 pH (UA) 7.5 5.0 - 8.0 Protein NEGATIVE  NEG mg/dL Glucose NEGATIVE  NEG mg/dL Ketone TRACE (A) NEG mg/dL Bilirubin NEGATIVE  NEG Blood LARGE (A) NEG Urobilinogen 1.0 0.2 - 1.0 EU/dL Nitrites NEGATIVE  NEG Leukocyte Esterase NEGATIVE  NEG    
URINE MICROSCOPIC ONLY Collection Time: 02/03/19  8:44 PM  
Result Value Ref Range WBC NEGATIVE  0 - 5 /hpf  
 RBC TOO NUMEROUS TO COUNT 0 - 5 /hpf Epithelial cells NEGATIVE  0 - 5 /lpf  Bacteria 3+ (A) NEG /hpf  
 
 
 Radiologic Studies -  
CT CHEST ABD PELV WO CONT Final Result IMPRESSION:  
  
Several left-sided rib fractures, some acute and others appearing likely  
subacute. No pneumothorax or other associated complication. CT SPINE CERV WO CONT Final Result IMPRESSION:  
  
No evidence of acute C-spine fracture. CT HEAD WO CONT Final Result IMPRESSION:  
  
No acute intracranial findings. XR ELBOW LT MIN 3 V    (Results Pending) XR HIP LT W OR WO PELV 2-3 VWS    (Results Pending) XR CHEST PORT    (Results Pending) CT Results  (Last 48 hours) 02/03/19 2029  CT CHEST ABD PELV WO CONT Final result Impression:  IMPRESSION:  
   
Several left-sided rib fractures, some acute and others appearing likely  
subacute. No pneumothorax or other associated complication. Narrative:  EXAM: CT CHEST, ABDOMEN AND PELVIS   
   
CLINICAL INDICATION/HISTORY: Fall with chest injury and pain.  
-Additional: None COMPARISON: None TECHNIQUE: Axial CT imaging of the chest, abdomen, and pelvis was performed  
without intravenous contrast. Multiplanar reformats were generated. One or more  
dose reduction techniques were used on this CT: automated exposure control,  
adjustment of the mAs and/or kVp according to patient size, and iterative  
reconstruction techniques. The specific techniques used on this CT exam have  
been documented in the patient's electronic medical record. Digital Imaging and  
Communications in Medicine (DICOM) format image data are available to  
nonaffiliated external healthcare facilities or entities on a secure, media  
free, reciprocally searchable basis with patient authorization for at least a  
12-month period after this study. ________________ FINDINGS:  
   
CHEST:  
   
LUNGS: No suspicious nodule or mass. Mild subsegmental atelectasis at the left  
base. AIRWAY: Normal.  
   
PLEURA: Normal, with no effusion or pneumothorax. MEDIASTINUM: Normal heart size. No pericardial effusion. Mild coronary and  
aortic atherosclerotic calcifications. LYMPH NODES: No enlarged lymph nodes. OTHER: None.  
   
_______________ ABDOMEN/PELVIS:  
   
LIVER, BILIARY: Liver is normal. No biliary dilation. Cholecystectomy. SPLEEN: Normal.  
   
PANCREAS: Normal.  
   
ADRENALS: Normal.  
   
KIDNEYS/URETERS/BLADDER: Small nonobstructing stone at the lower pole of the  
left kidney. Otherwise unremarkable. LYMPH NODES: No enlarged lymph nodes. GASTROINTESTINAL TRACT: No bowel dilation or wall thickening. VASCULATURE: Mild atherosclerotic calcifications. PELVIC ORGANS: Fiducial markers in the prostate. BONES:   
  > Acute appearing nondisplaced fractures of left posterolateral eighth and  
ninth ribs.   
  > Fractures of left lateral ninth and 10th ribs appear to be healing, likely  
subacute.   
  > Mildly angulated acute appearing left posterior 11th rib fracture also  
noted. OTHER: None.  
   
_______________  
   
  
 02/03/19 2029  CT SPINE CERV WO CONT Final result Impression:  IMPRESSION:  
   
No evidence of acute C-spine fracture. Narrative:  EXAM: CT CERVICAL SPINE CLINICAL INDICATION/HISTORY: Fall with neck injury and pain. .  
-Additional: None. COMPARISON: None TECHNIQUE: Serial axial images of the cervical spine were obtained from the  
skull base to the thoracic inlet without intravenous contrast. Sagittal and  
coronal reformations were obtained from the source data. Automated exposure  
control, mAs and/or kVp reductions based on patient size, and iterative  
reconstruction. The specific techniques utilized on this CT exam have been  
documented in the patient's electronic medical record.  Digital Imaging and  
Communications in Medicine (DICOM) format image data are available to  
nonaffiliated external healthcare facilities or entities on a secure, media  
free, reciprocally searchable basis with patient authorization for at least a  
12-month period after this study. _______________ FINDINGS:  
   
VERTEBRAE AND DISCS: Normal vertebral body alignment. No significant listhesis. Vertebral body height is preserved. No fracture identified. Moderate  
degenerative disc changes, most pronounced C4-5, C5-6, C6-7. Facet arthropathy  
at C7-T1. SPINAL CANAL AND FORAMINA: No significant spinal canal stenosis. Multilevel mild  
foraminal stenosis. EXTRASPINAL SOFT TISSUES: Carotid calcifications. LUNG APICES: Clear. OTHER: None.  
   
_______________  
   
  
 02/03/19 2028  CT HEAD WO CONT Final result Impression:  IMPRESSION:  
   
No acute intracranial findings. Narrative:  EXAM: CT HEAD WITHOUT CONTRAST CLINICAL INDICATION/HISTORY: Fall with head injury and pain.  
-Additional: None COMPARISON: 12/19/2018 TECHNIQUE: Serial axial images of the head were performed without intravenous  
contrast. Dose reduction techniques:  Automated exposure control, mAs and/or kVp  
reductions based on patient size, and iterative reconstruction. The specific  
techniques utilized on this CT exam have been documented in the patient's  
electronic medical record. Digital Imaging and Communications in Medicine (DICOM) format image data are available to nonaffiliated external healthcare  
facilities or entities on a secure, media free, reciprocally searchable basis  
with patient authorization for at least a 12-month period after this study. _______________ FINDINGS:  
   
BRAIN: No evidence of acute intraparenchymal hemorrhage. No mass effect or  
edema. No evidence of acute cortical ischemia. Normal-appearing white matter. Brain volume normal for age. EXTRA-AXIAL SPACES: No extra-axial hemorrhage. No hydrocephalus. CALVARIA: Intact. OTHER: The visualized portions of the paranasal sinuses and mastoid air cells  
are clear.   
   
_______________ CXR Results  (Last 48 hours) None RADIOLOGY FINDINGS Left elbow X-ray shows NAP Pending review by Radiologist 
Recorded by Jade Roman PA-C 
 
RADIOLOGY FINDINGS Chest XR X-ray shows NAP Pending review by Radiologist 
Recorded by Jade Roman PA-C 
  
RADIOLOGY FINDINGS Left hip X-ray shows post surgical changes but NAP Pending review by Radiologist 
Recorded by Jade Roman PA-C Medications given in the ED- Medications  
sodium chloride (NS) flush 5-40 mL (10 mL IntraVENous Given 2/4/19 0036)  
sodium chloride (NS) flush 5-40 mL (not administered)  
acetaminophen (TYLENOL) tablet 650 mg (not administered)  
ondansetron (ZOFRAN) injection 4 mg (not administered)  
enoxaparin (LOVENOX) injection 40 mg (40 mg SubCUTAneous Given 2/4/19 0037)  
ketorolac (TORADOL) injection 15 mg (15 mg IntraVENous Given 2/4/19 0036) lidocaine 4 % patch 1 Patch (1 Patch TransDERmal Apply Patch 2/4/19 0038) aspirin delayed-release tablet 325 mg (325 mg Oral Given 2/4/19 0036) carbidopa-levodopa (SINEMET)  mg per tablet 0.5 Tab (0.5 Tabs Oral Given 2/4/19 0037) carbidopa-levodopa-entacapone (STALEVO) 37.5-150-200 mg tablet 1 Tab- Patient supplied  (Patient Supplied) (not administered) cycloSPORINE (RESTASIS) 0.05 % ophthalmic emulsion 1 Drop (1 Drop Both Eyes Refused 2/4/19 0101) pantoprazole (PROTONIX) tablet 40 mg (not administered)  
rasagiline (AZILECT) tablet 1 mg (1 mg Oral Given 2/4/19 0102)  
sertraline (ZOLOFT) tablet 50 mg (not administered)  
hydrALAZINE (APRESOLINE) 20 mg/mL injection 20 mg (not administered)  
acetaminophen (TYLENOL) tablet 650 mg (650 mg Oral Given 2/3/19 2007) Medical Decision Making I am the first provider for this patient.  
 
I reviewed the vital signs, available nursing notes, past medical history, past surgical history, family history and social history. Vital Signs-Reviewed the patient's vital signs. Pulse Oximetry Analysis - 98% on RA Records Reviewed: Nursing Notes and Old Medical Records Provider Notes (Medical Decision Making): 82year old with a history of Parkinson's and recent hip fractures with repair presents with left rib pain after a reported fall at assisted living facility. Pt is a poor historian, majority of the history obtained by daughter, who states that he has had several falls recently and that she is only now being informed of them. He has multiple rib fractures on the left with no other acute findings. Will admit for pulmonary hygiene. Procedures: 
Procedures ED Course:  
5:51 PM Initial assessment performed. The patients presenting problems have been discussed, and they are in agreement with the care plan formulated and outlined with them. I have encouraged them to ask questions as they arise throughout their visit. 6:27 PM Discussed patient's history, exam, and available diagnostics results with Rommel Lala DO, Emergency Medicine, who recommends testing the patient's CK. 
 
6:56 PM Per pt's daughter, pt had an unwitnessed fall on Thursday. Per nursing supervisor, pt's aide went to get a snack and came back to find the pt on the floor. The facility states that they have a 72 hour protocol, so they would not do x-rays. Pt stated that he had new pain after this fall and was very uncomfortable. Pt has recently been given melatonin because \"he would not sleep soundly\" per pt's nursing facility. Per pt's daughter, pt had been given his medications by a new nurse in [facility]. She states that the pt must take medications on a strict schedule or it makes him \"loopy\". She was told pt did not sleep well last night and that he screamed and pointed to his left flank when he lifted his arm up. She states that a stat XR(which took 4 hours) was ordered, and that she was called and informed that he had a fractured left rib. Pt was still c/o pain and also had a lumbar xray ordered. Per daughter, pt's mental status has been inconsistent but, but she notes that he has had a \"timing change\" on his medications. Pt potentially had a new fall today. After his last surgery pt had no longer gotten up to go to the bathroom or feed himself (resolved). Per pt's neurologist, this is a \"combination of all the surgeries he has had\". Patient's daughter also adds that the pt also had diarrhea (loose stool) the same day the fall occurred, and he is not on any new antibiotics. Written by [facility], acting as Scribes for Irving Thompson PA-C. 
 
9:18 PM Discussed patient's history, exam, and available diagnostics results with Toña Lazo MD, Emergency, who recommends admitting the patient. 9:45 PM Discussed patient's history, exam, and available diagnostics results with JEFF Cortes for Roro Renee MD, Hospitalist, who states that he will come evaluate the patient. 10:30 PM Discussed patient's history, exam, and available diagnostics results with Roro Renee MD, Hospitalist, who accepts that patient to Medical. 
 
Diagnosis and Disposition Critical Care Time: none Core Measures: 
For Hospitalized Patients: 
 
1. Hospitalization Decision Time: The decision to hospitalize the patient was made by Irving Thompson PA-C at 9:18 PM on 2/3/2019 2. Aspirin: No ASA given as the patient is currently on ASA at home 11:39 PM  Patient is being admitted to the hospital by Roro Renee MD. The results of their tests and reasons for their admission have been discussed with them and/or available family. They convey agreement and understanding for the need to be admitted and for their admission diagnosis. CONDITIONS ON ADMISSION: 
Sepsis is not present at the time of admission. Deep Vein Thrombosis is not present at the time of admission. Thrombosis is not present at the time of admission. Urinary Tract Infection is not present at the time of admission. Pneumonia is not present at the time of admission. MRSA is not present at the time of admission. Wound infection is not present at the time of admission. Pressure Ulcer is not present at the time of admission. CLINICAL IMPRESSION: 
 
1. Closed fracture of multiple ribs of left side, initial encounter 2. Pain 3. Fall, initial encounter PLAN: 
1. Admit to Medical 
_______________________________ Attestations: This note is prepared by Tiffanie García and [facility], acting as Scribes for Lesli De La Fuente PA-C. Lesli De La Fuente PA-C:  The scribe's documentation has been prepared under my direction and personally reviewed by me in its entirety. I confirm that the note above accurately reflects all work, treatment, procedures, and medical decision making performed by me. 
_______________________________

## 2019-02-04 LAB
ANION GAP SERPL CALC-SCNC: 5 MMOL/L (ref 3–18)
BASOPHILS # BLD: 0 K/UL (ref 0–0.1)
BASOPHILS NFR BLD: 0 % (ref 0–2)
BUN SERPL-MCNC: 16 MG/DL (ref 7–18)
BUN/CREAT SERPL: 22 (ref 12–20)
CALCIUM SERPL-MCNC: 8.2 MG/DL (ref 8.5–10.1)
CHLORIDE SERPL-SCNC: 111 MMOL/L (ref 100–108)
CO2 SERPL-SCNC: 28 MMOL/L (ref 21–32)
CREAT SERPL-MCNC: 0.72 MG/DL (ref 0.6–1.3)
DIFFERENTIAL METHOD BLD: ABNORMAL
EOSINOPHIL # BLD: 0.2 K/UL (ref 0–0.4)
EOSINOPHIL NFR BLD: 3 % (ref 0–5)
ERYTHROCYTE [DISTWIDTH] IN BLOOD BY AUTOMATED COUNT: 13 % (ref 11.6–14.5)
GLUCOSE SERPL-MCNC: 98 MG/DL (ref 74–99)
HCT VFR BLD AUTO: 39.8 % (ref 36–48)
HGB BLD-MCNC: 12.9 G/DL (ref 13–16)
LYMPHOCYTES # BLD: 1.1 K/UL (ref 0.9–3.6)
LYMPHOCYTES NFR BLD: 15 % (ref 21–52)
MCH RBC QN AUTO: 30.2 PG (ref 24–34)
MCHC RBC AUTO-ENTMCNC: 32.4 G/DL (ref 31–37)
MCV RBC AUTO: 93.2 FL (ref 74–97)
MONOCYTES # BLD: 0.7 K/UL (ref 0.05–1.2)
MONOCYTES NFR BLD: 10 % (ref 3–10)
NEUTS SEG # BLD: 5 K/UL (ref 1.8–8)
NEUTS SEG NFR BLD: 72 % (ref 40–73)
PLATELET # BLD AUTO: 217 K/UL (ref 135–420)
PMV BLD AUTO: 10 FL (ref 9.2–11.8)
POTASSIUM SERPL-SCNC: 3.9 MMOL/L (ref 3.5–5.5)
RBC # BLD AUTO: 4.27 M/UL (ref 4.7–5.5)
SODIUM SERPL-SCNC: 144 MMOL/L (ref 136–145)
WBC # BLD AUTO: 7 K/UL (ref 4.6–13.2)

## 2019-02-04 PROCEDURE — 96374 THER/PROPH/DIAG INJ IV PUSH: CPT

## 2019-02-04 PROCEDURE — 80048 BASIC METABOLIC PNL TOTAL CA: CPT

## 2019-02-04 PROCEDURE — 77030010545

## 2019-02-04 PROCEDURE — 96376 TX/PRO/DX INJ SAME DRUG ADON: CPT

## 2019-02-04 PROCEDURE — 76450000000

## 2019-02-04 PROCEDURE — 74011000250 HC RX REV CODE- 250: Performed by: PHYSICIAN ASSISTANT

## 2019-02-04 PROCEDURE — 36415 COLL VENOUS BLD VENIPUNCTURE: CPT

## 2019-02-04 PROCEDURE — 85025 COMPLETE CBC W/AUTO DIFF WBC: CPT

## 2019-02-04 PROCEDURE — 96375 TX/PRO/DX INJ NEW DRUG ADDON: CPT

## 2019-02-04 PROCEDURE — 99218 HC RM OBSERVATION: CPT

## 2019-02-04 PROCEDURE — 96372 THER/PROPH/DIAG INJ SC/IM: CPT

## 2019-02-04 PROCEDURE — 74011250637 HC RX REV CODE- 250/637: Performed by: PHYSICIAN ASSISTANT

## 2019-02-04 PROCEDURE — 74011250636 HC RX REV CODE- 250/636: Performed by: PHYSICIAN ASSISTANT

## 2019-02-04 PROCEDURE — 65390000012 HC CONDITION CODE 44 OBSERVATION

## 2019-02-04 RX ORDER — HYDRALAZINE HYDROCHLORIDE 20 MG/ML
20 INJECTION INTRAMUSCULAR; INTRAVENOUS
Status: DISCONTINUED | OUTPATIENT
Start: 2019-02-04 | End: 2019-02-05 | Stop reason: HOSPADM

## 2019-02-04 RX ORDER — RANITIDINE 150 MG/1
150 TABLET, FILM COATED ORAL 2 TIMES DAILY
COMMUNITY
End: 2019-02-05

## 2019-02-04 RX ADMIN — SERTRALINE HYDROCHLORIDE 50 MG: 50 TABLET ORAL at 08:49

## 2019-02-04 RX ADMIN — CARBIDOPA, LEVODOPA AND ENTACAPONE 1 TABLET: 37.5; 200; 15 TABLET, FILM COATED ORAL at 08:24

## 2019-02-04 RX ADMIN — ASPIRIN 325 MG: 325 TABLET, DELAYED RELEASE ORAL at 08:49

## 2019-02-04 RX ADMIN — PANTOPRAZOLE SODIUM 40 MG: 40 TABLET, DELAYED RELEASE ORAL at 08:49

## 2019-02-04 RX ADMIN — CARBIDOPA AND LEVODOPA 0.5 TABLET: 25; 100 TABLET ORAL at 21:42

## 2019-02-04 RX ADMIN — ENOXAPARIN SODIUM 40 MG: 40 INJECTION SUBCUTANEOUS at 00:37

## 2019-02-04 RX ADMIN — KETOROLAC TROMETHAMINE 15 MG: 15 INJECTION, SOLUTION INTRAMUSCULAR; INTRAVENOUS at 00:36

## 2019-02-04 RX ADMIN — ASPIRIN 325 MG: 325 TABLET, DELAYED RELEASE ORAL at 21:41

## 2019-02-04 RX ADMIN — CARBIDOPA, LEVODOPA AND ENTACAPONE 1 TABLET: 37.5; 200; 15 TABLET, FILM COATED ORAL at 12:34

## 2019-02-04 RX ADMIN — ACETAMINOPHEN 650 MG: 325 TABLET ORAL at 14:13

## 2019-02-04 RX ADMIN — CARBIDOPA AND LEVODOPA 0.5 TABLET: 25; 100 TABLET ORAL at 12:34

## 2019-02-04 RX ADMIN — ENOXAPARIN SODIUM 40 MG: 40 INJECTION SUBCUTANEOUS at 21:54

## 2019-02-04 RX ADMIN — KETOROLAC TROMETHAMINE 15 MG: 15 INJECTION, SOLUTION INTRAMUSCULAR; INTRAVENOUS at 16:52

## 2019-02-04 RX ADMIN — KETOROLAC TROMETHAMINE 15 MG: 15 INJECTION, SOLUTION INTRAMUSCULAR; INTRAVENOUS at 08:21

## 2019-02-04 RX ADMIN — HYDRALAZINE HYDROCHLORIDE 20 MG: 20 INJECTION INTRAMUSCULAR; INTRAVENOUS at 05:57

## 2019-02-04 RX ADMIN — ASPIRIN 325 MG: 325 TABLET, DELAYED RELEASE ORAL at 00:36

## 2019-02-04 RX ADMIN — ACETAMINOPHEN 650 MG: 325 TABLET ORAL at 09:59

## 2019-02-04 RX ADMIN — Medication 10 ML: at 23:13

## 2019-02-04 RX ADMIN — RASAGILINE 1 MG: 1 TABLET ORAL at 23:12

## 2019-02-04 RX ADMIN — CYCLOSPORINE 1 DROP: 0.5 EMULSION OPHTHALMIC at 08:48

## 2019-02-04 RX ADMIN — CARBIDOPA AND LEVODOPA 0.5 TABLET: 25; 100 TABLET ORAL at 00:37

## 2019-02-04 RX ADMIN — Medication 10 ML: at 00:36

## 2019-02-04 RX ADMIN — ACETAMINOPHEN 650 MG: 325 TABLET ORAL at 21:41

## 2019-02-04 RX ADMIN — CARBIDOPA, LEVODOPA AND ENTACAPONE 1 TABLET: 37.5; 200; 15 TABLET, FILM COATED ORAL at 21:53

## 2019-02-04 RX ADMIN — CARBIDOPA, LEVODOPA AND ENTACAPONE 1 TABLET: 37.5; 200; 15 TABLET, FILM COATED ORAL at 16:22

## 2019-02-04 RX ADMIN — Medication 10 ML: at 08:27

## 2019-02-04 RX ADMIN — CARBIDOPA AND LEVODOPA 0.5 TABLET: 25; 100 TABLET ORAL at 16:22

## 2019-02-04 RX ADMIN — CARBIDOPA AND LEVODOPA 0.5 TABLET: 25; 100 TABLET ORAL at 08:49

## 2019-02-04 RX ADMIN — Medication 10 ML: at 14:19

## 2019-02-04 RX ADMIN — RASAGILINE 1 MG: 1 TABLET ORAL at 01:02

## 2019-02-04 RX ADMIN — CYCLOSPORINE 1 DROP: 0.5 EMULSION OPHTHALMIC at 21:41

## 2019-02-04 NOTE — PROGRESS NOTES
Problem: Falls - Risk of 
Goal: *Absence of Falls Document Luda Bella Fall Risk and appropriate interventions in the flowsheet. Outcome: Progressing Towards Goal 
Fall Risk Interventions: 
  
 
Mentation Interventions: Adequate sleep, hydration, pain control, Bed/chair exit alarm, Door open when patient unattended, Evaluate medications/consider consulting pharmacy, More frequent rounding, Reorient patient, Room close to nurse's station, Toileting rounds, Update white board Medication Interventions: Evaluate medications/consider consulting pharmacy, Bed/chair exit alarm, Patient to call before getting OOB, Teach patient to arise slowly Elimination Interventions: Bed/chair exit alarm, Call light in reach, Patient to call for help with toileting needs, Toileting schedule/hourly rounds History of Falls Interventions: Bed/chair exit alarm, Consult care management for discharge planning, Door open when patient unattended, Evaluate medications/consider consulting pharmacy, Investigate reason for fall, Room close to nurse's station(confusion, weakness) Problem: Patient Education: Go to Patient Education Activity Goal: Patient/Family Education Outcome: Progressing Towards Goal 
Pt education limited by mental status; daughter Roxy Clayton very engaged in care Problem: Pain Goal: *Control of Pain Outcome: Progressing Towards Goal 
Pt seems comfortable on toradol/tylenol rotation; adult nonverbal pain scale. Daughter at bedside this morning, advises against narcotics -- severely exacerbate pt confusion. Goal: *PALLIATIVE CARE:  Alleviation of Pain Outcome: Progressing Towards Goal 
RN recommends palliative consult. Problem: Patient Education: Go to Patient Education Activity Goal: Patient/Family Education Outcome: Progressing Towards Goal 
Pt understanding limited by orientation level; daughter Roxy Clayton very engaged in care

## 2019-02-04 NOTE — PROGRESS NOTES
Problem: Falls - Risk of 
Goal: *Absence of Falls Document Kellee Mistry Fall Risk and appropriate interventions in the flowsheet. Outcome: Progressing Towards Goal 
Fall Risk Interventions: 
  
 
Mentation Interventions: Bed/chair exit alarm, Door open when patient unattended, More frequent rounding, Reorient patient, Room close to nurse's station, Toileting rounds Medication Interventions: Bed/chair exit alarm Elimination Interventions: Call light in reach, Urinal in reach History of Falls Interventions: Bed/chair exit alarm, Door open when patient unattended, Room close to nurse's station, Investigate reason for fall

## 2019-02-04 NOTE — ROUTINE PROCESS
Bedside shift change report given to Janes Hall RN (oncoming nurse) by Suzan Henry RN (offgoing nurse). Report included the following information SBAR, Kardex, ED Summary, Intake/Output, MAR and Recent Results. Daughter, Opal Almanza, at bedside. Assisted with completion of admission documentation. Pt resting in bed, appears to be mumbling to self.

## 2019-02-04 NOTE — PHYSICIAN ADVISORY
Letter of admission status determination - Agree with Observation Alix Adhkiari Age: 82 y.o. MRN: [id number] Date of admission: 2/3/2019 I have reviewed this case as it involves a Medicare patient that was admitted as Inpatient and was subsequently changed to Observation status. Patient's condition and the documented plan of care at the time of presentation do not fully support the need for medically necessary hospitalization for over two midnights. Therefore, I agree with Outpatient OBSERVATION. This may change due to the medical condition of the patient and new clinical evidence as the patients care progreses. The final decision regarding the patient's hospitalization status depends on the attending physician's judgment. Conrad Taylor MD, IRIS, [facility], [facility] Physician Advisor [id number] [facility]. 
[phone]

## 2019-02-04 NOTE — H&P
History & Physical 
Patient: Melinda Boyce MRN: [id number]  CSN: [id number] YOB: 1935  Age: 82 y.o. Sex: male DOA: 2/3/2019 Primary Care Provider:  Robinson Smalls DO Assessment/Plan Patient Active Problem List  
Diagnosis Code  Parkinson disease ([facility] G20  
 Closed right hip fracture ([facility] S72.001A  GERD (gastroesophageal reflux disease) K21.9  Falls W19. Marzella Mins  Multiple rib fractures S22.49XA  Parkinson's disease dementia ([facility] G20, F02.80 1. Rib Fractures 2. Falls 3. Parkinson Disease with Dementia 1. Admit to general medical [facility] for observation. Per patient's daughter, does not tolerate narcotic pain medication well. Will allow for patient to have Toradol every six hours as needed for pain, as well as Tylenol as needed for pain. Apply lidocaine patch to left ribs. 2. Per patient's daughter, patient has been falling at rehab. Will consult physical therapy for evaluation for tomorrow AM. 3. Continue chronic Sinemet, Stalevo, and Azilect. No acute changes. Estimated length of stay: 24-48 hours CC: Rib Pain HPI:  
 
Melinda Boyce is a 82 y.o. male who has a history of prostate cancer, GERD, Parkinson disease who presents to the ER today with a history of multiple falls over the recent days. Per his daughter, he has been to two separate rehab facilities where he has suffered falls within the last six months. She states that she was notified of the rib fractures today and thus has brought him to the ER for further evaluation. Patient reports left sided pain, varying in intensity, no alleviating factors, aggravating factors of breathing, movement, and palpation. Past Medical History:  
Diagnosis Date  At risk for falls  Cancer [facility])   
 prostate [address]  GERD (gastroesophageal reflux disease)  Parkinson disease ([facility]  Psychiatric disorder   
 depression Past Surgical History:  
Procedure Laterality Date  HX CATARACT REMOVAL Bilateral   
 HX HERNIA REPAIR    
 x2  
 HX LAP CHOLECYSTECTOMY  HX VASECTOMY History reviewed. No pertinent family history. Social History Socioeconomic History  Marital status:  Spouse name: Not on file  Number of children: Not on file  Years of education: Not on file  Highest education level: Not on file Tobacco Use  Smoking status: Never Smoker  Smokeless tobacco: Never Used Substance and Sexual Activity  Alcohol use: No  
  Frequency: Never  Drug use: No  
 Sexual activity: No  
 
 
Prior to Admission medications Medication Sig Start Date End Date Taking? Authorizing Provider  
acetaminophen (TYLENOL) 325 mg tablet Take 2 Tabs by mouth every six (6) hours as needed. 1/17/19   Davida Andersen MD  
enoxaparin (LOVENOX) 40 mg/0.4 mL 0.4 mL by SubCUTAneous route every twenty-four (24) hours for 30 days. 1/18/19 2/17/19  Davida Andersen MD  
aspirin delayed-release 325 mg tablet Take 1 Tab by mouth two (2) times a day. 12/19/18   Altagracia Kulkarni PA-C  
pantoprazole (PROTONIX) 40 mg tablet Take 40 mg by mouth daily. Provider, Historical  
rasagiline (AZILECT) 1 mg tablet Take 1 mg by mouth nightly. Provider, Historical  
metaxalone (SKELAXIN) 400 mg tablet Take 1 Tab by mouth three (3) times daily as needed. 12/10/18   JEFF Rubin  
carbidopa-levodopa (SINEMET)  mg per tablet Take 0.5 Tabs by mouth four (4) times daily. Cha Paula MD  
carbidopa-levodopa-entacapone (STALEVO) 37.5-150-200 mg tablet Take 1 Tab by mouth Before breakfast, lunch, dinner and at bedtime. Cha Paula MD  
cycloSPORINE (RESTASIS) 0.05 % dpet Administer 1 Drop to both eyes every twelve (12) hours. Cha Paula MD  
mirabegron ER (MYRBETRIQ) 50 mg ER tablet Take 50 mg by mouth daily. Cha Paula MD  
sertraline (ZOLOFT) 50 mg tablet Take 50 mg by mouth daily. Cha Paula MD  
 
 
Allergies Allergen Reactions  Other Medication Other (comments) Daughter asking for no narcotics due to pt's reaction to percocet and states pt has parkinson's.  Percocet [Oxycodone-Acetaminophen] Other (comments) \"confusion\" per daughter Review of Systems Gen: No fever, chills, malaise, weight loss/gain. Heent: No headache, rhinorrhea, epistaxis, ear pain, hearing loss, sinus pain, neck pain/stiffness, sore throat. Heart: No chest pain, palpitations, CARRANZA, pnd, or orthopnea. Resp: No cough, hemoptysis, wheezing and shortness of breath. GI: No nausea, vomiting, diarrhea, constipation, melena or hematochezia. : No urinary obstruction, dysuria or hematuria. Derm: No rash, new skin lesion or pruritis. Musc/skeletal: +Left rib pain. Vasc: No edema, cyanosis or claudication. Endo: No heat/cold intolerance, no polyuria,polydipsia or polyphagia. Neuro: No unilateral weakness, numbness, tingling. No seizures. Heme: No easy bruising or bleeding. Physical Exam:  
 
Physical Exam: 
Visit Vitals /79 Pulse 75 Temp 98.2 °F (36.8 °C) Resp 16 Ht 5' 8\" (1.727 m) Wt 79.1 kg (174 lb 6.4 oz) SpO2 97% BMI 26.52 kg/m² O2 Device: Room air Temp (24hrs), Av.2 °F (36.8 °C), Min:98.2 °F (36.8 °C), Max:98.2 °F (36.8 °C) No intake/output data recorded. No intake/output data recorded. General:  Pleasantly demented elderly white male. Awake, cooperative, no distress. Head:  Normocephalic, without obvious abnormality, atraumatic. Eyes:  Conjunctivae/corneas clear, sclera anicteric, PERRL, EOMs intact. Nose: Nares normal. No drainage or sinus tenderness. Throat: Lips, mucosa, and tongue normal.   
Neck: Supple, symmetrical, trachea midline, no adenopathy. Lungs:   Clear to auscultation bilaterally. Tender to palpation left ribs. Heart:  Regular rate and rhythm, S1, S2 normal, no murmur, click, rub or gallop. Abdomen: Soft, non-tender. Bowel sounds normal. No masses,  No organomegaly. Extremities: Extremities normal, atraumatic, no cyanosis or edema. Capillary refill normal.  
Pulses: 2+ and symmetric all extremities. Skin: Skin color and turgor normal. No rashes or lesions Neurologic: CNII-XII intact. No focal motor or sensory deficit. Labs Reviewed: All lab results for the last 24 hours reviewed. Recent Results (from the past 24 hour(s)) CBC WITH AUTOMATED DIFF Collection Time: 02/03/19  5:39 PM  
Result Value Ref Range WBC 9.4 4.6 - 13.2 K/uL  
 RBC 4.47 (L) 4.70 - 5.50 M/uL  
 HGB 13.5 13.0 - 16.0 g/dL HCT 42.2 36.0 - 48.0 % MCV 94.4 74.0 - 97.0 FL  
 MCH 30.2 24.0 - 34.0 PG  
 MCHC 32.0 31.0 - 37.0 g/dL  
 RDW 12.9 11.6 - 14.5 % PLATELET 284 155 - 218 K/uL MPV 10.4 9.2 - 11.8 FL  
 NEUTROPHILS 80 (H) 40 - 73 % LYMPHOCYTES 10 (L) 21 - 52 % MONOCYTES 8 3 - 10 % EOSINOPHILS 2 0 - 5 % BASOPHILS 0 0 - 2 %  
 ABS. NEUTROPHILS 7.5 1.8 - 8.0 K/UL  
 ABS. LYMPHOCYTES 0.9 0.9 - 3.6 K/UL  
 ABS. MONOCYTES 0.8 0.05 - 1.2 K/UL  
 ABS. EOSINOPHILS 0.2 0.0 - 0.4 K/UL  
 ABS. BASOPHILS 0.0 0.0 - 0.1 K/UL  
 DF AUTOMATED METABOLIC PANEL, COMPREHENSIVE Collection Time: 02/03/19  5:39 PM  
Result Value Ref Range Sodium 143 136 - 145 mmol/L Potassium 3.9 3.5 - 5.5 mmol/L Chloride 107 100 - 108 mmol/L  
 CO2 31 21 - 32 mmol/L Anion gap 5 3.0 - 18 mmol/L Glucose 102 (H) 74 - 99 mg/dL BUN 17 7.0 - 18 MG/DL Creatinine 0.82 0.6 - 1.3 MG/DL  
 BUN/Creatinine ratio 21 (H) 12 - 20 GFR est AA >60 >60 ml/min/1.73m2 GFR est non-AA >60 >60 ml/min/1.73m2 Calcium 8.5 8.5 - 10.1 MG/DL Bilirubin, total 0.4 0.2 - 1.0 MG/DL  
 ALT (SGPT) 9 (L) 16 - 61 U/L  
 AST (SGOT) 15 15 - 37 U/L Alk. phosphatase 156 (H) 45 - 117 U/L Protein, total 6.8 6.4 - 8.2 g/dL Albumin 3.6 3.4 - 5.0 g/dL Globulin 3.2 2.0 - 4.0 g/dL A-G Ratio 1.1 0.8 - 1.7 CK  
 Collection Time: 02/03/19  5:39 PM  
Result Value Ref Range  39 - 308 U/L  
URINALYSIS W/ RFLX MICROSCOPIC Collection Time: 02/03/19  8:44 PM  
Result Value Ref Range Color DARK YELLOW Appearance TURBID Specific gravity 1.020 1.005 - 1.030    
 pH (UA) 7.5 5.0 - 8.0 Protein NEGATIVE  NEG mg/dL Glucose NEGATIVE  NEG mg/dL Ketone TRACE (A) NEG mg/dL Bilirubin NEGATIVE  NEG Blood LARGE (A) NEG Urobilinogen 1.0 0.2 - 1.0 EU/dL Nitrites NEGATIVE  NEG Leukocyte Esterase NEGATIVE  NEG    
URINE MICROSCOPIC ONLY Collection Time: 02/03/19  8:44 PM  
Result Value Ref Range WBC NEGATIVE  0 - 5 /hpf  
 RBC TOO NUMEROUS TO COUNT 0 - 5 /hpf Epithelial cells NEGATIVE  0 - 5 /lpf Bacteria 3+ (A) NEG /hpf  
 
 
Procedures/imaging: see electronic medical records for all procedures/Xrays and details which were not copied into this note but were reviewed prior to creation of Plan CC: Kika Roberts DO

## 2019-02-04 NOTE — PROGRESS NOTES
Rounds with Dr. Megan Lynn -- 
* Palliaitve and PT consults added -- pt is WBAT. * RN asked about ortho consult -- per MD, not indicated at this time. * MD aware of UA results, passing of clots, and intolerance of condom cath -- no related orders received at this time. Urine cx pending. * Pt unable to participate in/maintain intentional repositioning schedule d/t orientation level. Summary -- Pt appeared increasingly comfortable throughout shift. Pain appears responsive to toradol/tylenol prn regimen; using adult nonverbal pain scale. Napped frequently. Good appetite, particularly later in day. Afternoon void showed flecks, no clots, and pt did not appear to be in pain when voiding; significant improvement from this morning. Pt is unable to comply with recommended turning schedule d/t mental status -- will only permit particular positions and guards when encouraged or assisted to turn, position away from [address] [place] [address], etc. 
 
Patient Vitals for the past 12 hrs: 
 Temp Pulse Resp BP SpO2  
02/04/19 1517 98.5 °F (36.9 °C) 74 20 135/64 96 % 02/04/19 1414    120/74   
02/04/19 1200 98.6 °F (37 °C) 80 20 173/85 96 % 02/04/19 0839 98 °F (36.7 °C) 84 18 140/70 98 % Bedside shift change report given to Donna Mart RN (oncoming nurse) by Angelita Chau RN (offgoing nurse). Report included the following information SBAR, Kardex, ED Summary, Intake/Output, MAR, Recent Results and Med Rec Status.

## 2019-02-04 NOTE — ED NOTES
Assumed care and received report of patient from Richard Lopez Pt off the floor at X-ray at this time.

## 2019-02-04 NOTE — ROUTINE PROCESS
TRANSFER - OUT REPORT: 
 
Verbal report given to Nic Saul RN and May RN(name) on Michele Plasencia  being transferred to [facility] ([facility]) for routine progression of care Report consisted of patients Situation, Background, Assessment and  
Recommendations(SBAR). Information from the following report(s) SBAR, ED Summary, Procedure Summary, MAR, Recent Results and Cardiac Rhythm normal sinus  was reviewed with the receiving nurse. Lines:  
Peripheral IV 02/03/19 Left Forearm (Active) Dressing Status Clean, dry, & intact 2/3/2019  5:39 PM  
Dressing Type Transparent 2/3/2019  5:39 PM  
Hub Color/Line Status Pink;Flushed 2/3/2019  5:39 PM  
Action Taken Blood drawn 2/3/2019  5:39 PM  
  
 
Opportunity for questions and clarification was provided. Patient transported with: 
 [facility]

## 2019-02-04 NOTE — ROUTINE PROCESS
Bedside and Verbal shift change report given to Lashaun Vinson RN (oncoming nurse) by Ankit Elizondo RN 
 (offgoing nurse). Report included the following information SBAR, Kardex, ED Summary, Intake/Output, MAR, Recent Results and Med Rec Status.

## 2019-02-04 NOTE — PROGRESS NOTES
Problem: Falls - Risk of 
Goal: *Absence of Falls Document Ann Sarmiento Fall Risk and appropriate interventions in the flowsheet. Outcome: Progressing Towards Goal 
Fall Risk Interventions: 
  
 
  
 
  
 
Elimination Interventions: Call light in reach, Urinal in reach History of Falls Interventions: Door open when patient unattended, Room close to nurse's station

## 2019-02-04 NOTE — PROGRESS NOTES
Stalevo tablets provided pt's daughter, Freddy Mart. She indicates that pt usually takes 1 tablet together with sinemet at 8 am, 12pm, 4pm, and 8pm. One tablet given now and 11 tabs sent to pharmacy.

## 2019-02-04 NOTE — PROGRESS NOTES
Reason for Readmission:     Rib fractures/fall RRAT Score and Risk Level:    Mod; 17  
  
Level of Readmission:   2 Care Conference scheduled:   TBD Resources/supports as identified by patient/family:    
    
Top Challenges facing patient (as identified by patient/family and CM): Finances/Medication cost?      
Transportation Support system or lack thereof? Living arrangements? Self-care/ADLs/Cognition? Yes Current Advanced Directive/Advance Care Plan:  on file Plan for utilizing home health:   TBD Likelihood of additional readmission:    
          
Transition of Care Plan:    Based on readmission, the patient's previous Plan of Care 
 has been evaluated and/or modified. The current Transition of Care Plan is:    SNF Chart reviewed. Pt was transferred from [facility]. Per H&P Serjio Blount is a 82 y.o. male who has a history of prostate cancer, GERD, Parkinson disease who presents to the ER today with a history of multiple falls over the recent days. Per his daughter, he has been to two separate rehab facilities where he has suffered falls within the last six months. She states that she was notified of the rib fractures today and thus has brought him to the ER for further evaluation. Patient reports left sided pain, varying in intensity, no alleviating factors, aggravating factors of breathing, movement, and palpation. \" Provider, please consider a Palliative care consult to assist with goals of care. CM continuing to follow. 1107: Notified [facility] is not able return to [facility]. CM attempted to contact pt's daughter, Nereida Bautista ([phone]), to discuss transition of care options. Unable to leave  due to vm being full. 
 
1200: 
Pt's daughter came to the nurses station at length. CM discussed transition of care options.   Pt's daughter is aware and in agreement with having clinical information sent to area facilities to see if there is a facility that is willing to accept that can meet pt's needs. CM discussed home with [facility] and pvt care givers as well. CM provided pt with a list of personal care agencies to refer to to assist.  Continuing to follow. 1400: 
CM noted the only accepting facility at this time is [facility]. CM contacted pt's daughter and provided an update. Pt's daughter is aware of plan transition to [facility] tomorrow by 12:00/12:30. Pt's daughter has indicated she is also looking into personal care and if she can get this arranged she will transition pt home. Anticipate pt will transition to [facility] tomorrow. Please arrange medical transport to [address], [phone] for report. Please include all hard scripts for controlled substances, med rec and dc summary in packet. Please medicate for pain prior to dc if possible and needed to help offset delay when patient first arrives to facility. Pt's daughter is aware of plan. CM continuing to follow and assist. 
 
Care Management Interventions PCP Verified by CM: Yes Mode of Transport at Discharge: BLS Transition of Care Consult (CM Consult): SNF Health Maintenance Reviewed: Yes Physical Therapy Consult: Yes Occupational Therapy Consult: Yes Current Support Network: Family Lives [place] [facility] Confirm Follow Up Transport: Family Plan discussed with Pt/Family/Caregiver: Yes Discharge Location Discharge Placement: Skilled nursing facility

## 2019-02-04 NOTE — ROUTINE PROCESS
Bedside and Verbal shift change report given to Jareth Jose (oncoming nurse) by Lucio Guerrero RN (offgoing nurse). Report included the following information SBAR, Kardex, Intake/Output, MAR, Recent Results and Med Rec Status.

## 2019-02-04 NOTE — ROUTINE PROCESS
TRANSFER - IN REPORT: 
 
Verbal report received from Els, Via Shy Arita RN(name) on [employer]  being received from ED(unit) for routine progression of care Report consisted of patients Situation, Background, Assessment and  
Recommendations(SBAR). Information from the following report(s) SBAR, ED Summary, Intake/Output, MAR and Recent Results was reviewed with the receiving nurse. Opportunity for questions and clarification was provided. Assessment completed upon patients arrival to unit and care assumed.

## 2019-02-04 NOTE — PROGRESS NOTES
Received patient via stretcher, alert and oriented to self. Right hip fracture with dry and intact foam dressing. BUE and BLE tremors due to parkinsons. 0127 called Karthik AGUILAR regarding joe blood in his urine. Mr. Mary Zambrano mentioned that he is aware about the blood, he is been having blood in his urine since the admission. No order received at this time regarding the blood in the urine. Recommended condom catheter for immobility and unstable fracture, order received. Reported BP of 174/61, he stated he will put in the order for Blood pressure. Gave BP medicine. Pt. Had been restless all night, pulled out his condom catheter. Bathe and placed an incontinent brief. 0740 provided incontinence care, during care patients continue to have a bloody urine and passing large blood clots. Informed the oncoming nurse.

## 2019-02-04 NOTE — PROGRESS NOTES
Oral and Written notification given to patient and/or caregiver informing them that they are currently an Outpatient receiving care in our facility. Outpatient services include Observation Services under [address] and [facility]GUYEN requirements.

## 2019-02-05 VITALS
TEMPERATURE: 97.7 F | DIASTOLIC BLOOD PRESSURE: 77 MMHG | OXYGEN SATURATION: 96 % | RESPIRATION RATE: 18 BRPM | SYSTOLIC BLOOD PRESSURE: 147 MMHG | WEIGHT: 168.6 LBS | HEIGHT: 68 IN | BODY MASS INDEX: 25.55 KG/M2 | HEART RATE: 70 BPM

## 2019-02-05 PROCEDURE — 99218 HC RM OBSERVATION: CPT

## 2019-02-05 PROCEDURE — 96376 TX/PRO/DX INJ SAME DRUG ADON: CPT

## 2019-02-05 PROCEDURE — 74011250636 HC RX REV CODE- 250/636: Performed by: PHYSICIAN ASSISTANT

## 2019-02-05 PROCEDURE — 74011250637 HC RX REV CODE- 250/637: Performed by: PHYSICIAN ASSISTANT

## 2019-02-05 RX ADMIN — KETOROLAC TROMETHAMINE 15 MG: 15 INJECTION, SOLUTION INTRAMUSCULAR; INTRAVENOUS at 16:06

## 2019-02-05 RX ADMIN — CARBIDOPA, LEVODOPA AND ENTACAPONE 1 TABLET: 37.5; 200; 15 TABLET, FILM COATED ORAL at 16:07

## 2019-02-05 RX ADMIN — CYCLOSPORINE 1 DROP: 0.5 EMULSION OPHTHALMIC at 08:35

## 2019-02-05 RX ADMIN — Medication 10 ML: at 16:09

## 2019-02-05 RX ADMIN — CARBIDOPA AND LEVODOPA 0.5 TABLET: 25; 100 TABLET ORAL at 12:04

## 2019-02-05 RX ADMIN — CARBIDOPA AND LEVODOPA 0.5 TABLET: 25; 100 TABLET ORAL at 08:35

## 2019-02-05 RX ADMIN — CARBIDOPA, LEVODOPA AND ENTACAPONE 1 TABLET: 37.5; 200; 15 TABLET, FILM COATED ORAL at 08:34

## 2019-02-05 RX ADMIN — SERTRALINE HYDROCHLORIDE 50 MG: 50 TABLET ORAL at 08:35

## 2019-02-05 RX ADMIN — CARBIDOPA AND LEVODOPA 0.5 TABLET: 25; 100 TABLET ORAL at 16:06

## 2019-02-05 RX ADMIN — Medication 10 ML: at 06:58

## 2019-02-05 RX ADMIN — KETOROLAC TROMETHAMINE 15 MG: 15 INJECTION, SOLUTION INTRAMUSCULAR; INTRAVENOUS at 08:42

## 2019-02-05 RX ADMIN — ASPIRIN 325 MG: 325 TABLET, DELAYED RELEASE ORAL at 08:35

## 2019-02-05 RX ADMIN — ACETAMINOPHEN 650 MG: 325 TABLET ORAL at 16:07

## 2019-02-05 RX ADMIN — CARBIDOPA, LEVODOPA AND ENTACAPONE 1 TABLET: 37.5; 200; 15 TABLET, FILM COATED ORAL at 12:04

## 2019-02-05 RX ADMIN — PANTOPRAZOLE SODIUM 40 MG: 40 TABLET, DELAYED RELEASE ORAL at 08:35

## 2019-02-05 NOTE — ROUTINE PROCESS
Bedside and Verbal shift change report given to *** (oncoming nurse) by Arya Horton RN 
(offgoing nurse). Report included the following information SBAR, Kardex, Intake/Output, MAR and Recent Results.

## 2019-02-05 NOTE — PROGRESS NOTES
D/C Plan: [address] [place] [address] and Rehab 2/5/19 Pt is medically stable to be discharged. CM met with pt, daughter and provider at bedside to discuss transition of care. Pt's daughter is aware of acceptance and plan transition today. Pt's daughter has requested additional time to arrange 24 hours supervision at home. CM notified pt's daughter that this would take time to arrange and pt can be discharged safely to [facility] while she makes arrangements for personal care at home. Pt's family has been provided with a list of area personal care agencies on two occasions to assist family with coordinating personal care in the home with [facility] services. Pt's daughter inquired about pt remaining inpt in order to use his Medicare benefit. CM notified pt /family that pt meets criteria based on his previous admission earlier this month. Pt is currently admitted as an observation pt and pt's daughter has been provided with an OBS letter. CM requested transport time be moved to 2:00pm.  Pt's daughter is aware and in agreement. Please arrange medial transport to [address] [place] [address] and [address] [place] [address], [address] [place] [address], [phone] for report. Please include all hard scripts for controlled substances, med rec and dc summary in packet. Please medicate for pain prior to dc if possible and needed to help offset delay when patient first arrives to facility. No other transition of care needs have been identified at this time. Care Management Interventions PCP Verified by CM: Yes Mode of Transport at Discharge: BLS Transition of Care Consult (CM Consult): SNF Health Maintenance Reviewed: Yes Physical Therapy Consult: Yes Occupational Therapy Consult: Yes Current Support Network: Family Lives [place] 3 Confirm Follow Up Transport: Family Plan discussed with Pt/Family/Caregiver: Yes Discharge Location Discharge Placement: Skilled nursing facility

## 2019-02-05 NOTE — PROGRESS NOTES
Shift summary: Alert and oriented x 1. Medicated once for pain using nonverbal pain scale. Incontinent care provided frequently; nahun urine with red flecks observed. Pt set up for dinner, ate more than half of meal.  Daughter at bedside for a few hours; all questions addressed. Bed alarm maintained for safety. Patient Vitals for the past 12 hrs: 
 Temp Pulse Resp BP SpO2  
02/05/19 0314 98.2 °F (36.8 °C) 74 16 186/82 100 % 02/04/19 2324 97.7 °F (36.5 °C) 74 18 149/87 96 % 02/04/19 2002 97.7 °F (36.5 °C) 69 18 148/71 98 %

## 2019-02-05 NOTE — DISCHARGE SUMMARY
Discharge Summary    Patient: Neyda Luz MRN: [id number]  CSN: [id number]    YOB: 1935  Age: 82 y.o. Sex: male    DOA: 2/3/2019 LOS:  LOS: 1 day   Discharge Date:      Primary Care Provider:  Kristofer Grant DO    Admission Diagnoses: Multiple rib fractures [S22.49XA]    Discharge Diagnoses:    Problem List as of 2/5/2019 Date Reviewed: 1/15/2019          Codes Class Noted - Resolved    * (Principal) Multiple rib fractures ICD-10-CM: S22.49XA  ICD-9-CM: 807.09  2/3/2019 - Present        Parkinson's disease dementia ([facility] ICD-10-CM: Natan Wakefield, F02.80  ICD-9-CM: 332.0, 294.10  2/3/2019 - Present        Closed right hip fracture ([facility] ICD-10-CM: S72.001A  ICD-9-CM: 820.8  1/12/2019 - Present        GERD (gastroesophageal reflux disease) ICD-10-CM: K21.9  ICD-9-CM: 530.81  Unknown - Present        Falls ICD-10-CM: W19. [place]  ICD-9-CM: E888.9  Unknown - Present        Parkinson disease ([facility] ICD-10-CM: Natan Cloud  ICD-9-CM: 332.0  12/19/2018 - Present              Discharge Medications:     Current Discharge Medication List      CONTINUE these medications which have NOT CHANGED    Details   acetaminophen (TYLENOL) 325 mg tablet Take 2 Tabs by mouth every six (6) hours as needed. Qty: 30 Tab, Refills: 0      pantoprazole (PROTONIX) 40 mg tablet Take 40 mg by mouth daily. rasagiline (AZILECT) 1 mg tablet Take 1 mg by mouth nightly. mirabegron ER (MYRBETRIQ) 50 mg ER tablet Take 50 mg by mouth daily. sertraline (ZOLOFT) 50 mg tablet Take 50 mg by mouth daily. aspirin delayed-release 325 mg tablet Take 1 Tab by mouth two (2) times a day. Qty: 60 Tab, Refills: 0      carbidopa-levodopa (SINEMET)  mg per tablet Take 0.5 Tabs by mouth four (4) times daily. carbidopa-levodopa-entacapone (STALEVO) 37.5-150-200 mg tablet Take 1 Tab by mouth Before breakfast, lunch, dinner and at bedtime. cycloSPORINE (RESTASIS) 0.05 % dpet Administer 1 Drop to both eyes every twelve (12) hours. STOP taking these medications       raNITIdine (ZANTAC) 150 mg tablet Comments:   Reason for Stopping:         enoxaparin (LOVENOX) 40 mg/0.4 mL Comments:   Reason for Stopping:               Discharge Condition: Good          PHYSICAL EXAM   Visit Vitals  /77 (BP 1 Location: Left arm, BP Patient Position: At rest)   Pulse 70   Temp 97.7 °F (36.5 °C)   Resp 18   Ht 5' 8\" (1.727 m)   Wt 76.5 kg (168 lb 9.6 oz)   SpO2 96%   BMI 25.64 kg/m²     General: Awake, cooperative, no acute distress    HEENT: NC, Atraumatic. PERRLA, EOMI. Anicteric sclerae. Lungs:  CTA Bilaterally. No Wheezing/Rhonchi/Rales. Heart:  Regular  rhythm,  No murmur, No Rubs, No Gallops  Abdomen: Soft, Non distended, Non tender. +Bowel sounds,   Extremities: No c/c/e  Psych:   Not anxious or agitated. Neurologic:  No acute neurological deficits. Admission HPI :   Alix Adhikari is a 82 y.o. male who has a history of prostate cancer, GERD, Parkinson disease who presents to the ER today with a history of multiple falls over the recent days. Per his daughter, he has been to two separate rehab facilities where he has suffered falls within the last six months. She states that she was notified of the rib fractures today and thus has brought him to the ER for further evaluation. Patient reports left sided pain, varying in intensity, no alleviating factors, aggravating factors of breathing, movement, and palpation        Hospital Course :   Mr. Sylvie Gupta was admitted to medical floor, he did not had any acute events during hospitalization. Daughter does not want any narcotic pain medications as this makes patient confused. We continued his regular home medications for his parkinson's disease. He had urine checked and UA did not show evidence of UTI. Urine cultures with no growth to date. Given patient's dementia, he needed straight cath for urine sample.  This was probably traumatic as he had hematuria next day. However this is resolved. I have discussed with daughter and if this recurs then she will need follow up with urology. Patient is fall risk and he is falling frequently, he is very high fall risk. He will be discharged to rehab facility. Activity: Activity as tolerated    Diet: Regular Diet    Follow-up: PCP    Disposition: Rehab    Minutes spent on discharge: 45       Labs: Results:       Chemistry Recent Labs     02/04/19 0613 02/03/19  1739   GLU 98 102*    143   K 3.9 3.9   * 107   CO2 28 31   BUN 16 17   CREA 0.72 0.82   CA 8.2* 8.5   AGAP 5 5   BUCR 22* 21*   AP  --  156*   TP  --  6.8   ALB  --  3.6   GLOB  --  3.2   AGRAT  --  1.1      CBC w/Diff Recent Labs     02/04/19 0613 02/03/19  1739   WBC 7.0 9.4   RBC 4.27* 4.47*   HGB 12.9* 13.5   HCT 39.8 42.2    246   GRANS 72 80*   LYMPH 15* 10*   EOS 3 2      Cardiac Enzymes Recent Labs     02/03/19  1739         Coagulation No results for input(s): PTP, INR, APTT in the last 72 hours. No lab exists for component: INREXT    Lipid Panel No results found for: CHOL, CHOLPOCT, CHOLX, CHLST, CHOLV, 141842, HDL, LDL, LDLC, DLDLP, 963804, VLDLC, VLDL, TGLX, TRIGL, TRIGP, TGLPOCT, CHHD, CHHDX   BNP No results for input(s): BNPP in the last 72 hours. Liver Enzymes Recent Labs     02/03/19  1739   TP 6.8   ALB 3.6   *   SGOT 15      Thyroid Studies No results found for: T4, T3U, TSH, TSHEXT         Significant Diagnostic Studies: Xr Elbow Lt Min 3 V    Result Date: 2/4/2019  --------------------------------------------------------------------------- <<<<<<<<<           [facility]           >>>>>>>>> --------------------------------------------------------------------------- CLINICAL HISTORY: Fall. Pain. COMPARISON EXAMINATIONS: None. --- FOUR VIEWS OF THE LEFT ELBOW --- The lateral view is limited. No fracture or suspicious bone lesion. The soft tissues are unremarkable.   No evidence of elbow effusion. --------------    Impression: -------------- Somewhat limited study. No acute osseous abnormality identified. Xr Hip Lt W Or Wo Pelv 2-3 Vws    Result Date: 2/4/2019  Examination: Left hip-2 views. HISTORY: Left hip pain following fall. FINDINGS: AP supine projection of the pelvis as well as a frog-leg lateral projection of the left hip performed with comparison to same day abdominal/pelvic CT as well as prior left hip radiographs 12/17/2018. Intact ilioischial and iliopectineal lines. Percutaneous pin fixation present across bilateral femoral neck fractures. The medial portion of the left femoral neck fracture line is demonstrated, not unexpected given recent prior operative reduction and internal fixation. No superimposed acute fracture. Bilateral hip joint osteoarthritis as prior. IMPRESSION: 1. Reduced and percutaneously fixated left femoral neck fracture in unchanged alignment. Xr Hip Rt W Or Wo Pelv 2-3 Vws    Result Date: 1/12/2019  EXAM:  XR HIP RT W OR WO PELV 2-3 VWS CLINICAL HISTORY/INDICATION:  Ortho request -Additional:  Right hip fracture COMPARISON:  12/17/18, 12/08/18 TECHNIQUE:  2 views of the pelvis and right hip were performed.  _______________ FINDINGS:  The femoral heads are normally located bilaterally. There is contour irregularity involving the right femoral neck correlating with the fracture seen on recent hip CT. There are 4 fixation screws involving the left proximal femur. There is a fracture of the left femoral neck present which is not significantly displaced. Radiation seed implants are present involving the prostate gland. Lumbar spondylosis is present. _______________     IMPRESSION: Mild irregularity of the right femoral neck is present compatible with the known fracture demonstrated on recent hip CT.     Ct Head Wo Cont    Result Date: 2/3/2019  EXAM: CT HEAD WITHOUT CONTRAST CLINICAL INDICATION/HISTORY: Fall with head injury and pain. -Additional: None COMPARISON: 12/19/2018 TECHNIQUE: Serial axial images of the head were performed without intravenous contrast. Dose reduction techniques:  Automated exposure control, mAs and/or kVp reductions based on patient size, and iterative reconstruction. The specific techniques utilized on this CT exam have been documented in the patient's electronic medical record. Digital Imaging and Communications in Medicine (DICOM) format image data are available to nonaffiliated external healthcare facilities or entities on a secure, media free, reciprocally searchable basis with patient authorization for at least a 12-month period after this study. _______________ FINDINGS: BRAIN: No evidence of acute intraparenchymal hemorrhage. No mass effect or edema. No evidence of acute cortical ischemia. Normal-appearing white matter. Brain volume normal for age. EXTRA-AXIAL SPACES: No extra-axial hemorrhage. No hydrocephalus. CALVARIA: Intact. OTHER: The visualized portions of the paranasal sinuses and mastoid air cells are clear. _______________     IMPRESSION: No acute intracranial findings. Ct Chest Abd Pelv Wo Cont    Result Date: 2/3/2019  EXAM: CT CHEST, ABDOMEN AND PELVIS CLINICAL INDICATION/HISTORY: Fall with chest injury and pain. -Additional: None COMPARISON: None TECHNIQUE: Axial CT imaging of the chest, abdomen, and pelvis was performed without intravenous contrast. Multiplanar reformats were generated. One or more dose reduction techniques were used on this CT: automated exposure control, adjustment of the mAs and/or kVp according to patient size, and iterative reconstruction techniques. The specific techniques used on this CT exam have been documented in the patient's electronic medical record.  Digital Imaging and Communications in Medicine (DICOM) format image data are available to nonaffiliated external healthcare facilities or entities on a secure, media free, reciprocally searchable basis with patient authorization for at least a 12-month period after this study. ________________ FINDINGS: CHEST: LUNGS: No suspicious nodule or mass. Mild subsegmental atelectasis at the left base. AIRWAY: Normal. PLEURA: Normal, with no effusion or pneumothorax. MEDIASTINUM: Normal heart size. No pericardial effusion. Mild coronary and aortic atherosclerotic calcifications. LYMPH NODES: No enlarged lymph nodes. OTHER: None. _______________ ABDOMEN/PELVIS: LIVER, BILIARY: Liver is normal. No biliary dilation. Cholecystectomy. SPLEEN: Normal. PANCREAS: Normal. ADRENALS: Normal. KIDNEYS/URETERS/BLADDER: Small nonobstructing stone at the lower pole of the left kidney. Otherwise unremarkable. LYMPH NODES: No enlarged lymph nodes. GASTROINTESTINAL TRACT: No bowel dilation or wall thickening. VASCULATURE: Mild atherosclerotic calcifications. PELVIC ORGANS: Fiducial markers in the prostate. BONES:   > Acute appearing nondisplaced fractures of left posterolateral eighth and ninth ribs.   > Fractures of left lateral ninth and 10th ribs appear to be healing, likely subacute.   > Mildly angulated acute appearing left posterior 11th rib fracture also noted. OTHER: None. _______________     IMPRESSION: Several left-sided rib fractures, some acute and others appearing likely subacute. No pneumothorax or other associated complication. Ct Spine Cerv Wo Cont    Result Date: 2/3/2019  EXAM: CT CERVICAL SPINE CLINICAL INDICATION/HISTORY: Fall with neck injury and pain. . -Additional: None. COMPARISON: None TECHNIQUE: Serial axial images of the cervical spine were obtained from the skull base to the thoracic inlet without intravenous contrast. Sagittal and coronal reformations were obtained from the source data. Automated exposure control, mAs and/or kVp reductions based on patient size, and iterative reconstruction. The specific techniques utilized on this CT exam have been documented in the patient's electronic medical record.  Digital Imaging and Communications in Medicine (DICOM) format image data are available to nonaffiliated external healthcare facilities or entities on a secure, media free, reciprocally searchable basis with patient authorization for at least a 12-month period after this study. _______________ FINDINGS: VERTEBRAE AND DISCS: Normal vertebral body alignment. No significant listhesis. Vertebral body height is preserved. No fracture identified. Moderate degenerative disc changes, most pronounced C4-5, C5-6, C6-7. Facet arthropathy at C7-T1. SPINAL CANAL AND FORAMINA: No significant spinal canal stenosis. Multilevel mild foraminal stenosis. EXTRASPINAL SOFT TISSUES: Carotid calcifications. LUNG APICES: Clear. OTHER: None. _______________     IMPRESSION: No evidence of acute C-spine fracture. Ct Hips Bi Wo Cont    Result Date: 1/12/2019  EXAM:  CT HIPS BI WO CONT CLINICAL HISTORY/INDICATION:  Hip pain, acute, fx suspect, xray negative or indeterminate -Additional:  Status post fall COMPARISON:  Plain films from 12/17/18 TECHNIQUE:  A CT scan of the right hip was performed. Images were obtained from the iliac crest through the pubic symphysis. Sagittal and coronal reconstructions were performed to better evaluate the osseous anatomy. One or more dose reduction techniques were used on this CT: automated exposure control, adjustment of the mAs and/or kVp according to patient size, and iterative reconstruction techniques. The specific techniques used on this CT exam have been documented in the patient's electronic medical record. Digital Imaging and Communications in Medicine (DICOM) format image data are available to nonaffiliated external healthcare facilities or entities on a secure, media free, reciprocally searchable basis with patient authorization for at least a 12-month period after this study. _______________ FINDINGS: Osseous: The femoral heads are normally located within the acetabula. Bilateral hip degenerative changes are present.  Screws are seen within the proximal left femur. These were placed at the site of a prior femoral neck fracture. This femoral neck fracture is again identified. No significant displacement or angulation is present. There is an acute fracture present involving the right femoral neck. Lumbar spondylosis is present. Lumbar spine stenosis is present involving the visualized lower lumbar spine. Pelvic Organs:  Seed implants are present within the prostate gland. GI Tract:  No obstruction or ileus Lymph Nodes:  No enlarged nodes Vasculature: Atherosclerosis Other:  Mild soft tissue swelling is evident adjacent to the iliopsoas tendon _______________     IMPRESSION: There are 4 screws present within the left femoral neck at the site of a femoral neck fracture. This femoral neck fracture is identified. No significant angulation is present. There is an acute fracture involving the neck of the right femur Lumbar spondylosis with lumbar spinal stenosis CT is approximately 86% sensitive for evaluating acute nondisplaced hip fractures. When CT detects no fracture and there is persistent clinical suspicion of an occult fracture, MRI is useful for more sensitive evaluation. Xr Chest Port    Result Date: 2/4/2019  --------------------------------------------------------------------------- <<<<<<<<<           [facility]           >>>>>>>>> --------------------------------------------------------------------------- CLINICAL HISTORY:  Pain. COMPARISON EXAMINATIONS:  January 12, 2019. ---  SINGLE FRONTAL VIEW OF THE CHEST  --- The patient is mildly rotated. The cardiomediastinal silhouette is stable. There is no focal consolidation or pleural effusion. No significant osseous abnormalities are identified.  --------------    Impression: -------------- No active pulmonary disease.      Xr Chest Port    Result Date: 1/12/2019  EXAM: Portable Chest CLINICAL INDICATION:  pre op -Additional:  Right hip fracture 1141 ______________ FINDINGS: HEART AND MEDIASTINUM:  The heart size is normal. LUNGS AND AIRWAYS:  The lungs are clear. PLEURA:  No pleural effusion or pneumothorax. BONES:  No acute or aggressive osseous lesions. OTHER:  None. ______________     IMPRESSION: No active cardiopulmonary disease. Andreas Lorenzo Technologist Service    Result Date: 1/16/2019  See impression. Impression:  Fluoroscopy was provided for this procedure under the supervision and/or direction of the attending provider. ? For further information regarding this procedure, see patient medical record. Xr Hip Rt W Or Wo Pelv  1 Vw    Result Date: 1/14/2019  EXAM: AP right hip, one view INDICATION: Right hip fracture. Postop. COMPARISON: 1/12/2019 _______________ FINDINGS: 4 cannulated screws pass from the lateral subtrochanteric region across the femoral neck into the femoral head. No displaced fracture appreciated. No angulation. Arterial vascular calcifications are present in the medial upper leg. _______________     IMPRESSION: Right femoral neck fracture, no angulation or separation status post internal fixation. No results found for this or any previous visit.         CC: Ninfa Kwon, DO

## 2019-02-05 NOTE — PROGRESS NOTES
0730-received report from [address] included SBAR MAR and Kardex. 1330-called report to Chanel Rai at [facility], included SBAR MAR and Kardex. 1620-discharged to SNF via medical transport.

## 2019-02-05 NOTE — PROGRESS NOTES
[facility]: [phone] ([phone]) [facility]: [phone] ([phone]) Palliative consult received and appreciated. PM team, RN Ingrid Casey and this writer, saw patient at bedside. He presented confused laying in bed. Had breakfast tray in front, assisted him with tray. He was confused on mechanics of using utensil. No family was present to speak with. Informed RN of pt needing some assistance with food tray. Consulted with BRISEIDA Fournier regarding PM interventions needed. She informed pt will be d/c today around 12:30. Even if pt has been d/c PM team will still attempt to speak with dtr. Called [phone] and left vm on daughter [place] phone. Await call back from dtr to discuss goal of care. 1124-missed returned call. received vm from dtr [place]. Will attempt to call her again. Josh Heath, MSW Palliative

## 2019-02-06 ENCOUNTER — PATIENT OUTREACH (OUTPATIENT)
Dept: CASE MANAGEMENT | Age: 84
End: 2019-02-06

## 2019-02-06 LAB
BACTERIA SPEC CULT: NORMAL
SERVICE CMNT-IMP: NORMAL

## 2019-02-06 NOTE — PROGRESS NOTES
Community Care Team Documentation for Patient in [facility]     Patient discharged from [facility] 1/12/2019 - 1/17/2019 to [facility], on 1/17/2019. Hospital Discharge diagnosis: Closed right hip fracture . SNF Attending Provider:      Anticipated discharge date from SNF: 2/9/2019      PCP : José Luis Cabrera DO    Nurse Navigator:     [facility] team rounds completed, updates provided by facility. PT/OT/SP: 2/8 Cognition limits. Requires 1-1 care. Full Code  Living at senior living, question if can return. Interm [facility] in past.     Low Risk            12       Total Score        3 Has Seen PCP in Last 6 Months (Yes=3, No=0)    4 IP Visits Last 12 Months (1-3=4, 4=9, >4=11)    5 Charlson Comorbidity Score (Age + Comorbid Conditions)        Criteria that do not apply:    . Living with Significant Other. Assisted Living. LTAC. SNF. or   Rehab    Patient Length of Stay (>5 days = 3)    Pt.  Coverage (Medicare=5 , Medicaid, or Self-Pay=4)      Active Ambulatory Problems     Diagnosis Date Noted    Parkinson disease ([address] 12/19/2018    Closed right hip fracture ([address] 01/12/2019    GERD (gastroesophageal reflux disease)     Falls     Multiple rib fractures 02/03/2019    Parkinson's disease dementia ([address] 02/03/2019     Resolved Ambulatory Problems     Diagnosis Date Noted    No Resolved Ambulatory Problems     Past Medical History:   Diagnosis Date    At risk for falls     Cancer ([address]     Falls     GERD (gastroesophageal reflux disease)     Parkinson disease ([address]     Psychiatric disorder

## 2019-02-06 NOTE — PROGRESS NOTES
Community Care Team Documentation for Patient in [facility]     Patient discharged from [facility] 1/12/2019 - 1/17/2019 to [facility], [address], on 1/17/2019. Hospital Discharge diagnosis: Closed right hip fracture . SNF Attending Provider:      Anticipated discharge date from SNF: 2/9/2019      PCP : Elza Renee DO    Nurse Navigator:     [facility] team rounds completed, updates provided by facility. PT/OT/SP: 2/8 Cognition limits  Full Code  Dispo: dc to home. Will need assistance at home, needs 24/7 care. Currently lives alone. Low Risk            12       Total Score        3 Has Seen PCP in Last 6 Months (Yes=3, No=0)    4 IP Visits Last 12 Months (1-3=4, 4=9, >4=11)    5 Charlson Comorbidity Score (Age + Comorbid Conditions)        Criteria that do not apply:    . Living with Significant Other. Assisted Living. LTAC. SNF. or   Rehab    Patient Length of Stay (>5 days = 3)    Pt.  Coverage (Medicare=5 , Medicaid, or Self-Pay=4)      Active Ambulatory Problems     Diagnosis Date Noted    Parkinson disease ([address] 12/19/2018    Closed right hip fracture ([address] 01/12/2019    GERD (gastroesophageal reflux disease)     Falls     Multiple rib fractures 02/03/2019    Parkinson's disease dementia ([address] 02/03/2019     Resolved Ambulatory Problems     Diagnosis Date Noted    No Resolved Ambulatory Problems     Past Medical History:   Diagnosis Date    At risk for falls     Cancer ([address]     Falls     GERD (gastroesophageal reflux disease)     Parkinson disease ([address]     Psychiatric disorder

## 2019-05-14 LAB
ATRIAL RATE: 72 BPM
CALCULATED P AXIS, ECG09: 76 DEGREES
CALCULATED R AXIS, ECG10: 84 DEGREES
CALCULATED T AXIS, ECG11: 100 DEGREES
DIAGNOSIS, 93000: NORMAL
P-R INTERVAL, ECG05: 150 MS
Q-T INTERVAL, ECG07: 408 MS
QRS DURATION, ECG06: 82 MS
QTC CALCULATION (BEZET), ECG08: 446 MS
VENTRICULAR RATE, ECG03: 72 BPM

## (undated) DEVICE — SYR LR LCK 1ML GRAD NSAF 30ML --

## (undated) DEVICE — (D)PREP SKN CHLRAPRP APPL 26ML -- CONVERT TO ITEM 371833

## (undated) DEVICE — REM POLYHESIVE ADULT PATIENT RETURN ELECTRODE: Brand: VALLEYLAB

## (undated) DEVICE — 3M™ IOBAN™ 2 ANTIMICROBIAL INCISE DRAPE 6650EZ: Brand: IOBAN™ 2

## (undated) DEVICE — SUT MONOCRYL PLUS UD 3-0 --

## (undated) DEVICE — Device

## (undated) DEVICE — DRAPE,SPLIT ,77X120: Brand: MEDLINE

## (undated) DEVICE — KENDALL SCD EXPRESS SLEEVES, KNEE LENGTH, MEDIUM: Brand: KENDALL SCD

## (undated) DEVICE — ADHESIVE SKIN CLOSURE 4X22 CM PREMIERPRO EXOFINFUSION DISP

## (undated) DEVICE — (D)PACK EXTREMITY CUSTOM -- DISC BY MFR USE ITEM 338833

## (undated) DEVICE — INTENDED FOR TISSUE SEPARATION, AND OTHER PROCEDURES THAT REQUIRE A SHARP SURGICAL BLADE TO PUNCTURE OR CUT.: Brand: BARD-PARKER ® CARBON RIB-BACK BLADES

## (undated) DEVICE — HANDPIECE SET WITH FAN SPRAY TIP: Brand: INTERPULSE

## (undated) DEVICE — GUIDEWIRE ORTH L300MM DIA2.8MM S STL THRD SHRP TRCR TIP FOR

## (undated) DEVICE — FEMORAL CANAL TIP

## (undated) DEVICE — DRESSING FOAM SELF ADH 20X10 CM ABSORBENT MEPILEX BORDER

## (undated) DEVICE — DEVON™ KNEE AND BODY STRAP 60" X 3" (1.5 M X 7.6 CM): Brand: DEVON

## (undated) DEVICE — SUT VCRL + 2-0 36IN CT1 UD --

## (undated) DEVICE — SOL IRRIGATION INJ NACL 0.9% 500ML BTL

## (undated) DEVICE — BIT DRL L300MM DIA5MM CANN QUIK CPL ADJ STP REUSE

## (undated) DEVICE — 2108 SERIES SAGITTAL BLADE (24.8 X 1.24 X 80.1MM)

## (undated) DEVICE — SHEET,DRAPE,40X58,STERILE: Brand: MEDLINE

## (undated) DEVICE — GOWN,SIRUS,NONRNF,SETINSLV,2XL,18/CS: Brand: MEDLINE

## (undated) DEVICE — NEEDLE HYPO 18GA L1.5IN PNK POLYPR HUB S STL THN WALL FILL

## (undated) DEVICE — KIT CATH 16FR 5ML SIL URIN INDWL BLLN STR TIP INF CTRL W/

## (undated) DEVICE — SPONGE GZ W4XL4IN COT 12 PLY TYP VII WVN C FLD DSGN

## (undated) DEVICE — PACK PROCEDURE SURG ANTR HIP

## (undated) DEVICE — BSHR MAJOR BASIN PACK-LF: Brand: MEDLINE INDUSTRIES, INC.

## (undated) DEVICE — SHEET,DRAPE,70X85,STERILE: Brand: MEDLINE

## (undated) DEVICE — DRAPE TWL SURG 16X26IN BLU ORB04] ALLCARE INC]

## (undated) DEVICE — OSCILLATING TIP SAW CARTRIDGE: Brand: PRECISION FALCON

## (undated) DEVICE — SUTURE VCRL SZ 0 L36IN ABSRB UD L36MM CT-1 1/2 CIR J946H

## (undated) DEVICE — 3L THIN WALL CAN: Brand: CRD

## (undated) DEVICE — DRAIN SURG 15FR L3/16IN SIL RND 3/4 FLUT 3/16IN TRCR

## (undated) DEVICE — TOTAL HIP: Brand: MEDLINE INDUSTRIES, INC.

## (undated) DEVICE — MAYO STAND COVER: Brand: CONVERTORS